# Patient Record
Sex: MALE | Race: WHITE | Employment: FULL TIME | URBAN - METROPOLITAN AREA
[De-identification: names, ages, dates, MRNs, and addresses within clinical notes are randomized per-mention and may not be internally consistent; named-entity substitution may affect disease eponyms.]

---

## 2017-08-30 ENCOUNTER — OFFICE VISIT (OUTPATIENT)
Dept: ORTHOPEDIC SURGERY | Age: 42
End: 2017-08-30

## 2017-08-30 VITALS
WEIGHT: 232 LBS | BODY MASS INDEX: 32.48 KG/M2 | SYSTOLIC BLOOD PRESSURE: 156 MMHG | HEIGHT: 71 IN | HEART RATE: 75 BPM | DIASTOLIC BLOOD PRESSURE: 80 MMHG

## 2017-08-30 DIAGNOSIS — M25.512 PAIN OF LEFT SHOULDER REGION: Primary | ICD-10-CM

## 2017-08-30 DIAGNOSIS — S46.212A BICEPS TENDON RUPTURE, PROXIMAL, LEFT, INITIAL ENCOUNTER: ICD-10-CM

## 2017-08-30 PROBLEM — S46.119A BICEPS TENDON RUPTURE, PROXIMAL: Status: ACTIVE | Noted: 2017-08-30

## 2017-08-30 PROCEDURE — 73030 X-RAY EXAM OF SHOULDER: CPT | Performed by: ORTHOPAEDIC SURGERY

## 2017-08-30 PROCEDURE — 99203 OFFICE O/P NEW LOW 30 MIN: CPT | Performed by: ORTHOPAEDIC SURGERY

## 2018-03-29 ENCOUNTER — OFFICE VISIT (OUTPATIENT)
Dept: ORTHOPEDIC SURGERY | Age: 43
End: 2018-03-29

## 2018-03-29 VITALS — HEIGHT: 71 IN | BODY MASS INDEX: 31.72 KG/M2 | WEIGHT: 226.6 LBS

## 2018-03-29 DIAGNOSIS — M25.512 LEFT SHOULDER PAIN, UNSPECIFIED CHRONICITY: Primary | ICD-10-CM

## 2018-03-29 DIAGNOSIS — S43.432A TEAR OF LEFT GLENOID LABRUM, INITIAL ENCOUNTER: ICD-10-CM

## 2018-03-29 PROCEDURE — 99213 OFFICE O/P EST LOW 20 MIN: CPT | Performed by: PHYSICIAN ASSISTANT

## 2018-03-29 NOTE — PROGRESS NOTES
· Skin:  There are no rashes, ulcerations or lesions. · Gait & station: Normal gait      · Additional Examinations:        Right Upper Extremity:  Examination of the right upper extremity does not show any tenderness, deformity or injury. Range of motion is unremarkable. There is no gross instability. There are no rashes, ulcerations or lesions. Strength and tone are normal.      Diagnostic Testing:      Views:  3   Location:  Left shoulder   Findings:  Minimally sloped acromion, I do not see any significant arthritis at the a.c. joint but there looks to be early degenerative changes of the glenohumeral joint on x-ray    Orders     Orders Placed This Encounter   Procedures    XR SHOULDER LEFT (MIN 2 VIEWS)     [id number]     Order Specific Question:   Reason for exam:     Answer:   Pain         Assessment / Treatment Plan:     1. LEFT shoulder rotator cuff strain with suspicion for rotator cuff and labral tear    The patient is very active and heavy weightlifting. His job duties are also very physical.  He has had a 3-1/2 month history of ongoing and worsening anterior shoulder pain with associated mechanical symptoms. This is made weight training and also his work duties very difficult for him. He has been performing home exercises for the last 3 months including rotator cuff strengthening. He is also been using ice and elevation as well as over-the-counter NSAIDs with ongoing pain.   I recommended MRI arthrogram and follow-up with the results

## 2018-03-30 ENCOUNTER — TELEPHONE (OUTPATIENT)
Dept: ORTHOPEDIC SURGERY | Age: 43
End: 2018-03-30

## 2018-04-03 DIAGNOSIS — S43.432A TEAR OF LEFT GLENOID LABRUM, INITIAL ENCOUNTER: Primary | ICD-10-CM

## 2018-04-04 ENCOUNTER — TELEPHONE (OUTPATIENT)
Dept: ORTHOPEDIC SURGERY | Age: 43
End: 2018-04-04

## 2018-04-04 ENCOUNTER — HOSPITAL ENCOUNTER (OUTPATIENT)
Dept: MRI IMAGING | Age: 43
Discharge: HOME | End: 2018-04-04
Attending: PHYSICIAN ASSISTANT | Admitting: PHYSICIAN ASSISTANT

## 2018-04-04 DIAGNOSIS — S43.432A SUPERIOR GLENOID LABRUM LESION OF LEFT SHOULDER: ICD-10-CM

## 2018-04-04 DIAGNOSIS — S43.432A TEAR OF LEFT GLENOID LABRUM, INITIAL ENCOUNTER: ICD-10-CM

## 2018-04-09 ENCOUNTER — OFFICE VISIT (OUTPATIENT)
Dept: ORTHOPEDIC SURGERY | Age: 43
End: 2018-04-09

## 2018-04-09 VITALS
DIASTOLIC BLOOD PRESSURE: 89 MMHG | WEIGHT: 226.63 LBS | HEART RATE: 71 BPM | HEIGHT: 71 IN | SYSTOLIC BLOOD PRESSURE: 127 MMHG | BODY MASS INDEX: 31.73 KG/M2

## 2018-04-09 DIAGNOSIS — S46.212A RUPTURE OF LEFT PROXIMAL BICEPS TENDON, INITIAL ENCOUNTER: Primary | ICD-10-CM

## 2018-04-09 DIAGNOSIS — M25.512 LEFT SHOULDER PAIN, UNSPECIFIED CHRONICITY: ICD-10-CM

## 2018-04-09 DIAGNOSIS — M75.112 INCOMPLETE TEAR OF LEFT ROTATOR CUFF: ICD-10-CM

## 2018-04-09 DIAGNOSIS — M77.11 LATERAL EPICONDYLITIS OF BOTH ELBOWS: ICD-10-CM

## 2018-04-09 DIAGNOSIS — S43.102A SEPARATION OF LEFT ACROMIOCLAVICULAR JOINT, INITIAL ENCOUNTER: ICD-10-CM

## 2018-04-09 DIAGNOSIS — M77.12 LATERAL EPICONDYLITIS OF BOTH ELBOWS: ICD-10-CM

## 2018-04-09 PROCEDURE — 99213 OFFICE O/P EST LOW 20 MIN: CPT | Performed by: ORTHOPAEDIC SURGERY

## 2018-04-09 RX ORDER — DICLOFENAC SODIUM 75 MG/1
75 TABLET, DELAYED RELEASE ORAL 2 TIMES DAILY
Qty: 60 TABLET | Refills: 1 | Status: SHIPPED | OUTPATIENT
Start: 2018-04-09 | End: 2018-06-05 | Stop reason: SDUPTHER

## 2018-06-05 DIAGNOSIS — M75.112 INCOMPLETE TEAR OF LEFT ROTATOR CUFF: ICD-10-CM

## 2018-06-06 RX ORDER — DICLOFENAC SODIUM 75 MG/1
TABLET, DELAYED RELEASE ORAL
Qty: 60 TABLET | Refills: 0 | Status: SHIPPED | OUTPATIENT
Start: 2018-06-06 | End: 2018-07-02 | Stop reason: SDUPTHER

## 2018-07-02 DIAGNOSIS — M75.112 INCOMPLETE TEAR OF LEFT ROTATOR CUFF: ICD-10-CM

## 2018-07-02 RX ORDER — DICLOFENAC SODIUM 75 MG/1
TABLET, DELAYED RELEASE ORAL
Qty: 60 TABLET | Refills: 0 | Status: SHIPPED | OUTPATIENT
Start: 2018-07-02 | End: 2018-08-06 | Stop reason: SDUPTHER

## 2018-08-06 DIAGNOSIS — M75.112 INCOMPLETE TEAR OF LEFT ROTATOR CUFF: ICD-10-CM

## 2018-08-06 RX ORDER — DICLOFENAC SODIUM 75 MG/1
TABLET, DELAYED RELEASE ORAL
Qty: 60 TABLET | Refills: 0 | Status: SHIPPED | OUTPATIENT
Start: 2018-08-06 | End: 2018-09-07 | Stop reason: SDUPTHER

## 2018-09-07 DIAGNOSIS — M75.112 INCOMPLETE TEAR OF LEFT ROTATOR CUFF: ICD-10-CM

## 2018-09-10 DIAGNOSIS — M75.112 INCOMPLETE TEAR OF LEFT ROTATOR CUFF: ICD-10-CM

## 2018-09-10 RX ORDER — DICLOFENAC SODIUM 75 MG/1
TABLET, DELAYED RELEASE ORAL
Qty: 60 TABLET | Refills: 0 | Status: SHIPPED | OUTPATIENT
Start: 2018-09-10 | End: 2018-09-10 | Stop reason: SDUPTHER

## 2018-09-10 RX ORDER — DICLOFENAC SODIUM 75 MG/1
75 TABLET, DELAYED RELEASE ORAL 2 TIMES DAILY
Qty: 60 TABLET | Refills: 2 | Status: SHIPPED | OUTPATIENT
Start: 2018-09-10 | End: 2018-11-05 | Stop reason: ALTCHOICE

## 2018-09-25 ENCOUNTER — HOSPITAL ENCOUNTER (EMERGENCY)
Age: 43
Discharge: HOME | End: 2018-09-26

## 2018-09-25 VITALS
RESPIRATION RATE: 18 BRPM | HEIGHT: 71 IN | DIASTOLIC BLOOD PRESSURE: 96 MMHG | SYSTOLIC BLOOD PRESSURE: 159 MMHG | WEIGHT: 225 LBS | TEMPERATURE: 98.2 F | HEART RATE: 77 BPM | BODY MASS INDEX: 31.5 KG/M2 | OXYGEN SATURATION: 97 %

## 2018-09-25 DIAGNOSIS — K04.7 DENTAL INFECTION: Primary | ICD-10-CM

## 2018-09-25 PROCEDURE — 99282 EMERGENCY DEPT VISIT SF MDM: CPT

## 2018-09-25 RX ORDER — PENICILLIN V POTASSIUM 250 MG/1
500 TABLET ORAL ONCE
Status: COMPLETED | OUTPATIENT
Start: 2018-09-26 | End: 2018-09-26

## 2018-09-25 RX ORDER — PENICILLIN V POTASSIUM 500 MG/1
500 TABLET ORAL 4 TIMES DAILY
Qty: 28 TABLET | Refills: 0 | Status: SHIPPED | OUTPATIENT
Start: 2018-09-25 | End: 2018-10-02

## 2018-09-25 ASSESSMENT — PAIN DESCRIPTION - FREQUENCY: FREQUENCY: CONTINUOUS

## 2018-09-25 ASSESSMENT — PAIN DESCRIPTION - PAIN TYPE: TYPE: ACUTE PAIN

## 2018-09-25 ASSESSMENT — PAIN DESCRIPTION - LOCATION: LOCATION: TEETH

## 2018-09-25 ASSESSMENT — PAIN DESCRIPTION - DESCRIPTORS: DESCRIPTORS: ACHING

## 2018-09-25 ASSESSMENT — PAIN SCALES - GENERAL: PAINLEVEL_OUTOF10: 7

## 2018-09-26 PROCEDURE — 6370000000 HC RX 637 (ALT 250 FOR IP): Performed by: NURSE PRACTITIONER

## 2018-09-26 RX ADMIN — PENICILLIN V POTASSIUM 500 MG: 250 TABLET ORAL at 00:02

## 2018-09-26 NOTE — ED NOTES
Chief Complaint   Patient presents with    Dental Pain     pt states that he has had a broken tooth x 2 years. pt states that he past couple days it has been hurting more. Pt placed in room 15. Nad. Bed in low position, call light in reach. Will continue to monitor.       Yakelin Rowley RN  09/25/18 1200

## 2018-09-26 NOTE — ED PROVIDER NOTES
[facility]  eMERGENCY dEPARTMENT eNCOUnter      Pt Name: Audrey Matthews  MRN: [id number]  [place] 1975  Date of evaluation: 9/25/2018  Provider: ELBA Mason CNP      Chief Complaint:    Chief Complaint   Patient presents with    Dental Pain     pt states that he has had a broken tooth x 2 years. pt states that he past couple days it has been hurting more. Nursing Notes, Past Medical Hx, Past Surgical Hx, Social Hx, Allergies, and Family Hx were all reviewed and agreed with or any disagreements were addressed in the HPI.    HPI:  (Location, Duration, Timing, Severity, Quality, Assoc Sx, Context, Modifying factors)  This is a  44 y.o. male who presents the emergency department complaints of right upper dental pain. Patient reports he has had a broken tooth for over 1 year and then yesterday he developed pain in this tooth. He denies any fevers or chills. He does report some drainage from the tooth. He has not seen a dentist in several years. Past Medical/Surgical History:      Diagnosis Date    Asthma     as child         Procedure Laterality Date    ANKLE SURGERY      HAND SURGERY         Medications:  Discharge Medication List as of 9/25/2018 11:59 PM      CONTINUE these medications which have NOT CHANGED    Details   diclofenac (VOLTAREN) 75 MG EC tablet Take 1 tablet by mouth 2 times daily, Disp-60 tablet, R-2Normal               Review of Systems:  Review of Systems   Constitutional: Negative for chills and fever. HENT: Positive for dental problem. All other systems reviewed and are negative. Positives and Pertinent negatives as per HPI. Except as noted above in the ROS, problem specific ROS was completed and is negative. Physical Exam:  Physical Exam   Constitutional: He appears well-developed and well-nourished. HENT:   Head: Normocephalic and atraumatic.    Right Ear: External ear normal.   Left Ear: External ear normal.   Nose: Nose normal.   Mouth/Throat: Uvula is midline and oropharynx is clear and moist. No trismus in the jaw. Dental caries present. No dental abscesses. Eyes: Conjunctivae are normal.   Neck: Normal range of motion. Cardiovascular: Normal rate. Pulmonary/Chest: Effort normal.   Abdominal: He exhibits no distension. Musculoskeletal: Normal range of motion. Neurological: He is alert. Skin: Skin is warm and dry. Psychiatric: He has a normal mood and affect. His behavior is normal.   Nursing note and vitals reviewed. MEDICAL DECISION MAKING    Vitals:    Vitals:    09/25/18 2334   BP: (!) 159/96   Pulse: 77   Resp: 18   Temp: 98.2 °F (36.8 °C)   TempSrc: Oral   SpO2: 97%   Weight: 225 lb (102.1 kg)   Height: 5' 11\" (1.803 m)       LABS: Labs Reviewed - No data to display     Remainder of labs reviewed and were negative at this time or not returned at the time of this note. RADIOLOGY:   Non-plain film images such as CT, Ultrasound and MRI are read by the radiologist. ELBA Silva Chi, CNP have directly visualized the radiologic plain film image(s) with the below findings:        Interpretation per the Radiologist below, if available at the time of this note:    No orders to display        No results found. MEDICAL DECISION MAKING / ED COURSE:      PROCEDURES:   Procedures    None    Patient was given:  Medications   penicillin v potassium (VEETID) tablet 500 mg (500 mg Oral Given 9/26/18 0002)       Patient presented to the emergency department complaints of dental pain. Physical exam revealed a broken tooth with purulent drainage. There is no dental abscess. He had no trismus. No fevers or chills. He was started on penicillin. He was instructed to continue taking his diclofenac for pain. I did provide him with dental referrals. He is to follow-up with a dentist as soon as possible.   He is to return to the emergency department with any worsening symptoms. I discussed treatment plan with patient, patient is agreeable and denies questions at this time. The patient tolerated their visit well. I evaluated the patient. The physician was available for consultation as needed. The patient and / or the family were informed of the results of any tests, a time was given to answer questions, a plan was proposed and they agreed with plan. CLINICAL IMPRESSION:  1.  Dental infection        DISPOSITION Decision To Discharge 09/25/2018 11:58:59 PM      PATIENT REFERRED TO:  A dentist    Schedule an appointment as soon as possible for a visit in 3 days  For follow up care    [facility] ED  [address] [facility]  Go to   As needed, If symptoms worsen      DISCHARGE MEDICATIONS:  Discharge Medication List as of 9/25/2018 11:59 PM      START taking these medications    Details   penicillin v potassium (VEETID) 500 MG tablet Take 1 tablet by mouth 4 times daily for 7 days, Disp-28 tablet, R-0Print             DISCONTINUED MEDICATIONS:  Discharge Medication List as of 9/25/2018 11:59 PM                 (Please note the MDM and HPI sections of this note were completed with a voice recognition program.  Efforts were made to edit the dictations but occasionally words are mis-transcribed.)    Electronically signed, ELBA Gonzales CNP,        ELBA Gonzales CNP  09/26/18 0015

## 2018-11-05 ENCOUNTER — HOSPITAL ENCOUNTER (EMERGENCY)
Age: 43
Discharge: HOME | End: 2018-11-06

## 2018-11-05 VITALS
TEMPERATURE: 97.6 F | OXYGEN SATURATION: 98 % | RESPIRATION RATE: 14 BRPM | HEIGHT: 71 IN | HEART RATE: 75 BPM | SYSTOLIC BLOOD PRESSURE: 142 MMHG | DIASTOLIC BLOOD PRESSURE: 92 MMHG | WEIGHT: 225 LBS | BODY MASS INDEX: 31.5 KG/M2

## 2018-11-05 DIAGNOSIS — K08.89 PAIN, DENTAL: ICD-10-CM

## 2018-11-05 DIAGNOSIS — R68.84 JAW PAIN: ICD-10-CM

## 2018-11-05 DIAGNOSIS — K05.00 ACUTE GINGIVITIS: Primary | ICD-10-CM

## 2018-11-05 PROCEDURE — 99282 EMERGENCY DEPT VISIT SF MDM: CPT

## 2018-11-05 RX ORDER — CLINDAMYCIN HYDROCHLORIDE 300 MG/1
300 CAPSULE ORAL 4 TIMES DAILY
Qty: 28 CAPSULE | Refills: 0 | Status: SHIPPED | OUTPATIENT
Start: 2018-11-05 | End: 2018-11-12

## 2018-11-05 RX ORDER — HYDROCODONE BITARTRATE AND ACETAMINOPHEN 5; 325 MG/1; MG/1
1 TABLET ORAL ONCE
Status: DISCONTINUED | OUTPATIENT
Start: 2018-11-06 | End: 2018-11-06 | Stop reason: HOSPADM

## 2018-11-05 RX ORDER — CLINDAMYCIN HYDROCHLORIDE 150 MG/1
300 CAPSULE ORAL ONCE
Status: COMPLETED | OUTPATIENT
Start: 2018-11-06 | End: 2018-11-06

## 2018-11-05 RX ORDER — HYDROCODONE BITARTRATE AND ACETAMINOPHEN 5; 325 MG/1; MG/1
1 TABLET ORAL EVERY 6 HOURS PRN
Qty: 10 TABLET | Refills: 0 | Status: SHIPPED | OUTPATIENT
Start: 2018-11-05 | End: 2018-11-12

## 2018-11-05 ASSESSMENT — PAIN DESCRIPTION - LOCATION: LOCATION: FACE;TEETH

## 2018-11-05 ASSESSMENT — PAIN SCALES - GENERAL: PAINLEVEL_OUTOF10: 8

## 2018-11-06 PROCEDURE — 6370000000 HC RX 637 (ALT 250 FOR IP): Performed by: PHYSICIAN ASSISTANT

## 2018-11-06 RX ADMIN — CLINDAMYCIN HYDROCHLORIDE 300 MG: 150 CAPSULE ORAL at 00:39

## 2018-11-08 ASSESSMENT — ENCOUNTER SYMPTOMS
EYE REDNESS: 0
NAUSEA: 0
RHINORRHEA: 0
COUGH: 0
BACK PAIN: 0
ABDOMINAL PAIN: 0
EYE PAIN: 0
FACIAL SWELLING: 0
SORE THROAT: 0
DIARRHEA: 0
CONSTIPATION: 0
CHEST TIGHTNESS: 0
SHORTNESS OF BREATH: 0

## 2018-11-08 NOTE — ED PROVIDER NOTES
film images such as CT, Ultrasound and MRI are read by the radiologist. Tremayne Torres PA-C have directly visualized the radiologic plain film image(s) with the below findings:        Interpretation per the Radiologist below, if available at the time of thisnote:    No orders to display        No results found. MEDICAL DECISION MAKING / ED COURSE:      PROCEDURES:   Procedures    None    Patient was given:  Medications   clindamycin (CLEOCIN) capsule 300 mg (300 mg Oral Given 11/6/18 0039)       This patient presented to the emergency department for evaluation of Dental pain. At presentation, vital signs were stable. The patient was in no acute distress. Physical Exam findings were as noted above. He does have a dental nerve exposed therefore I do believe that narcotic pain medicine patient can be warranted. However, the patient did not have a ride home so he was not administered any here today. Does appear to have an infected tooth which he has been told previous times to follow-up with a dentist.  He was provided with some dental clinics in the area. I discussed these results with the patient and he/she understood    I discussed the nature and purpose, risks and benefits, as well as, the alternatives of opiates for pain relief with Mukesh Greco. The risks discussed included but were not limited to overdose, addiction, dependence, and tolerance. Mukesh Greco was given the time and opportunity to ask questions and consider their options, and after the discussion, Mukesh Greco decided to verbally consent to opiates. Prior to consenting, I believe that Mukesh Greco has the capacity to make this medical decision. This patient will be discharged home with the medications listed below. I counseled on how to take these medicines and risks/benefits and AEs. The patient was agreeable to the prescriptions.  He/She will follow up with primary care provider or present back for worsening symptoms. I estimate there is LOW risk for a DEEP SPACE INFECTION (e.g., JUANS ANGINA OR RETROPHARYNGEAL ABSCESS), MENINGITIS, or AIRWAY COMPROMISE, thus I consider the discharge disposition reasonable. Also, there is no evidence or peritonitis, sepsis, or toxicity. The patient tolerated their visit well. I evaluated the patient. The physician was available for consultation as needed. The patient and / or the family were informed of the results of anytests, a time was given to answer questions, a plan was proposed and they agreed with plan. CLINICAL IMPRESSION:  1. Acute gingivitis    2. Jaw pain    3. Pain, dental        DISPOSITION Decision To Discharge 11/05/2018 11:54:46 PM      PATIENT REFERRED TO:  [address]  [address] [address]  [phone]  Go to   As needed, If symptoms worsen      DISCHARGE MEDICATIONS:  Discharge Medication List as of 11/5/2018 11:56 PM      START taking these medications    Details   clindamycin (CLEOCIN) 300 MG capsule Take 1 capsule by mouth 4 times daily for 7 days, Disp-28 capsule, R-0Print      HYDROcodone-acetaminophen (NORCO) 5-325 MG per tablet Take 1 tablet by mouth every 6 hours as needed for Pain for up to 7 days. ., Disp-10 tablet, R-0Print             DISCONTINUED MEDICATIONS:  Discharge Medication List as of 11/5/2018 11:56 PM                 (Please note the MDM and HPI sections of this note were completed with a voice recognition program.  Efforts weremade to edit the dictations but occasionally words are mis-transcribed.)    Electronically signed, Matt Crawford PA-C,           Silvestre Augustin PA-C  11/08/18 5939

## 2018-12-31 ENCOUNTER — HOSPITAL ENCOUNTER (EMERGENCY)
Age: 43
Discharge: HOME | End: 2018-12-31

## 2018-12-31 VITALS
SYSTOLIC BLOOD PRESSURE: 130 MMHG | OXYGEN SATURATION: 99 % | WEIGHT: 225 LBS | TEMPERATURE: 98.3 F | BODY MASS INDEX: 31.5 KG/M2 | HEART RATE: 85 BPM | HEIGHT: 71 IN | RESPIRATION RATE: 15 BRPM | DIASTOLIC BLOOD PRESSURE: 88 MMHG

## 2018-12-31 DIAGNOSIS — K02.9 DENTAL CARIES: ICD-10-CM

## 2018-12-31 DIAGNOSIS — K08.89 PAIN, DENTAL: Primary | ICD-10-CM

## 2018-12-31 PROCEDURE — 99282 EMERGENCY DEPT VISIT SF MDM: CPT

## 2018-12-31 PROCEDURE — 6370000000 HC RX 637 (ALT 250 FOR IP): Performed by: NURSE PRACTITIONER

## 2018-12-31 RX ORDER — CHLORHEXIDINE GLUCONATE 0.12 MG/ML
15 RINSE ORAL 2 TIMES DAILY
Qty: 420 ML | Refills: 0 | Status: SHIPPED | OUTPATIENT
Start: 2018-12-31 | End: 2019-01-14

## 2018-12-31 RX ORDER — PENICILLIN V POTASSIUM 500 MG/1
500 TABLET ORAL 4 TIMES DAILY
Qty: 40 TABLET | Refills: 0 | Status: SHIPPED | OUTPATIENT
Start: 2018-12-31 | End: 2019-01-10

## 2018-12-31 RX ORDER — NAPROXEN 375 MG/1
375 TABLET ORAL 2 TIMES DAILY
Qty: 20 TABLET | Refills: 0 | Status: SHIPPED | OUTPATIENT
Start: 2018-12-31 | End: 2019-12-07

## 2018-12-31 RX ORDER — NAPROXEN 250 MG/1
250 TABLET ORAL ONCE
Status: COMPLETED | OUTPATIENT
Start: 2018-12-31 | End: 2018-12-31

## 2018-12-31 RX ORDER — PENICILLIN V POTASSIUM 250 MG/1
500 TABLET ORAL ONCE
Status: COMPLETED | OUTPATIENT
Start: 2018-12-31 | End: 2018-12-31

## 2018-12-31 RX ADMIN — NAPROXEN 250 MG: 250 TABLET ORAL at 20:19

## 2018-12-31 RX ADMIN — PENICILLIN V POTASSIUM 500 MG: 250 TABLET ORAL at 20:19

## 2018-12-31 RX ADMIN — LIDOCAINE HYDROCHLORIDE 5 ML: 20 SOLUTION ORAL; TOPICAL at 20:19

## 2018-12-31 ASSESSMENT — PAIN SCALES - GENERAL: PAINLEVEL_OUTOF10: 7

## 2018-12-31 ASSESSMENT — PAIN DESCRIPTION - LOCATION: LOCATION: TEETH

## 2018-12-31 ASSESSMENT — PAIN DESCRIPTION - PAIN TYPE: TYPE: ACUTE PAIN

## 2018-12-31 ASSESSMENT — PAIN DESCRIPTION - ORIENTATION: ORIENTATION: RIGHT;UPPER

## 2019-12-07 ENCOUNTER — HOSPITAL ENCOUNTER (EMERGENCY)
Age: 44
Discharge: HOME | End: 2019-12-07

## 2019-12-07 VITALS
RESPIRATION RATE: 18 BRPM | HEART RATE: 86 BPM | TEMPERATURE: 97.1 F | DIASTOLIC BLOOD PRESSURE: 78 MMHG | SYSTOLIC BLOOD PRESSURE: 136 MMHG | OXYGEN SATURATION: 100 %

## 2019-12-07 DIAGNOSIS — R03.0 ELEVATED BLOOD PRESSURE READING: ICD-10-CM

## 2019-12-07 DIAGNOSIS — M54.2 NECK PAIN: Primary | ICD-10-CM

## 2019-12-07 PROCEDURE — 6370000000 HC RX 637 (ALT 250 FOR IP): Performed by: EMERGENCY MEDICINE

## 2019-12-07 PROCEDURE — 99283 EMERGENCY DEPT VISIT LOW MDM: CPT

## 2019-12-07 RX ORDER — NAPROXEN 500 MG/1
500 TABLET ORAL 2 TIMES DAILY
Qty: 20 TABLET | Refills: 0 | Status: SHIPPED | OUTPATIENT
Start: 2019-12-07 | End: 2020-05-27

## 2019-12-07 RX ORDER — LIDOCAINE 50 MG/G
1 PATCH TOPICAL DAILY
Qty: 30 PATCH | Refills: 0 | Status: SHIPPED | OUTPATIENT
Start: 2019-12-07 | End: 2019-12-12

## 2019-12-07 RX ORDER — PREDNISONE 20 MG/1
40 TABLET ORAL DAILY
Qty: 10 TABLET | Refills: 0 | Status: SHIPPED | OUTPATIENT
Start: 2019-12-07 | End: 2019-12-12

## 2019-12-07 RX ORDER — METHOCARBAMOL 500 MG/1
500 TABLET, FILM COATED ORAL ONCE
Status: COMPLETED | OUTPATIENT
Start: 2019-12-07 | End: 2019-12-07

## 2019-12-07 RX ORDER — NAPROXEN 500 MG/1
500 TABLET ORAL ONCE
Status: COMPLETED | OUTPATIENT
Start: 2019-12-07 | End: 2019-12-07

## 2019-12-07 RX ORDER — LIDOCAINE 4 G/G
1 PATCH TOPICAL ONCE
Status: DISCONTINUED | OUTPATIENT
Start: 2019-12-07 | End: 2019-12-07 | Stop reason: HOSPADM

## 2019-12-07 RX ORDER — PREDNISONE 20 MG/1
40 TABLET ORAL ONCE
Status: COMPLETED | OUTPATIENT
Start: 2019-12-07 | End: 2019-12-07

## 2019-12-07 RX ORDER — METHOCARBAMOL 750 MG/1
750 TABLET, FILM COATED ORAL 4 TIMES DAILY
Qty: 40 TABLET | Refills: 0 | Status: SHIPPED | OUTPATIENT
Start: 2019-12-07 | End: 2019-12-17

## 2019-12-07 RX ADMIN — NAPROXEN 500 MG: 500 TABLET ORAL at 14:27

## 2019-12-07 RX ADMIN — METHOCARBAMOL TABLETS 500 MG: 500 TABLET, COATED ORAL at 14:27

## 2019-12-07 RX ADMIN — PREDNISONE 40 MG: 20 TABLET ORAL at 14:27

## 2019-12-07 ASSESSMENT — PAIN SCALES - GENERAL
PAINLEVEL_OUTOF10: 6
PAINLEVEL_OUTOF10: 6

## 2019-12-07 ASSESSMENT — PAIN DESCRIPTION - ORIENTATION: ORIENTATION: RIGHT

## 2019-12-07 ASSESSMENT — PAIN DESCRIPTION - LOCATION: LOCATION: SHOULDER

## 2019-12-07 ASSESSMENT — PAIN DESCRIPTION - PAIN TYPE: TYPE: ACUTE PAIN

## 2019-12-07 ASSESSMENT — PAIN DESCRIPTION - DESCRIPTORS: DESCRIPTORS: ACHING

## 2019-12-12 ENCOUNTER — OFFICE VISIT (OUTPATIENT)
Dept: ORTHOPEDIC SURGERY | Age: 44
End: 2019-12-12

## 2019-12-12 VITALS
HEIGHT: 71 IN | SYSTOLIC BLOOD PRESSURE: 130 MMHG | HEART RATE: 76 BPM | BODY MASS INDEX: 31.5 KG/M2 | WEIGHT: 225 LBS | DIASTOLIC BLOOD PRESSURE: 80 MMHG

## 2019-12-12 DIAGNOSIS — M54.12 CERVICAL RADICULOPATHY: ICD-10-CM

## 2019-12-12 DIAGNOSIS — M54.2 NECK PAIN: Primary | ICD-10-CM

## 2019-12-12 PROCEDURE — 99203 OFFICE O/P NEW LOW 30 MIN: CPT | Performed by: PHYSICAL MEDICINE & REHABILITATION

## 2020-05-12 ENCOUNTER — HOSPITAL ENCOUNTER (EMERGENCY)
Age: 45
Discharge: HOME | End: 2020-05-12

## 2020-05-12 VITALS
RESPIRATION RATE: 18 BRPM | TEMPERATURE: 98.1 F | HEIGHT: 70 IN | HEART RATE: 78 BPM | WEIGHT: 220 LBS | OXYGEN SATURATION: 97 % | BODY MASS INDEX: 31.5 KG/M2 | DIASTOLIC BLOOD PRESSURE: 92 MMHG | SYSTOLIC BLOOD PRESSURE: 148 MMHG

## 2020-05-12 PROCEDURE — 99282 EMERGENCY DEPT VISIT SF MDM: CPT

## 2020-05-12 PROCEDURE — 6370000000 HC RX 637 (ALT 250 FOR IP): Performed by: PHYSICIAN ASSISTANT

## 2020-05-12 RX ORDER — NAPROXEN 500 MG/1
500 TABLET ORAL ONCE
Status: COMPLETED | OUTPATIENT
Start: 2020-05-12 | End: 2020-05-12

## 2020-05-12 RX ORDER — NAPROXEN 500 MG/1
500 TABLET ORAL 2 TIMES DAILY
Qty: 20 TABLET | Refills: 0 | Status: SHIPPED | OUTPATIENT
Start: 2020-05-12 | End: 2020-05-27

## 2020-05-12 RX ORDER — LIDOCAINE HYDROCHLORIDE 20 MG/ML
15 SOLUTION OROPHARYNGEAL PRN
Qty: 1 BOTTLE | Refills: 0 | Status: SHIPPED | OUTPATIENT
Start: 2020-05-12 | End: 2020-05-27

## 2020-05-12 RX ORDER — PENICILLIN V POTASSIUM 500 MG/1
500 TABLET ORAL 4 TIMES DAILY
Qty: 28 TABLET | Refills: 0 | Status: SHIPPED | OUTPATIENT
Start: 2020-05-12 | End: 2020-05-19

## 2020-05-12 RX ORDER — PENICILLIN V POTASSIUM 250 MG/1
500 TABLET ORAL ONCE
Status: COMPLETED | OUTPATIENT
Start: 2020-05-12 | End: 2020-05-12

## 2020-05-12 RX ORDER — LIDOCAINE HYDROCHLORIDE 20 MG/ML
15 SOLUTION OROPHARYNGEAL ONCE
Status: COMPLETED | OUTPATIENT
Start: 2020-05-12 | End: 2020-05-12

## 2020-05-12 RX ADMIN — PENICILLIN V POTASIUM 500 MG: 250 TABLET ORAL at 18:27

## 2020-05-12 RX ADMIN — NAPROXEN 500 MG: 500 TABLET ORAL at 18:27

## 2020-05-12 RX ADMIN — LIDOCAINE HYDROCHLORIDE 15 ML: 20 SOLUTION ORAL; TOPICAL at 18:27

## 2020-05-12 ASSESSMENT — ENCOUNTER SYMPTOMS
GASTROINTESTINAL NEGATIVE: 1
RESPIRATORY NEGATIVE: 1

## 2020-05-12 ASSESSMENT — PAIN DESCRIPTION - DESCRIPTORS: DESCRIPTORS: THROBBING;SHOOTING

## 2020-05-12 ASSESSMENT — PAIN DESCRIPTION - PAIN TYPE: TYPE: ACUTE PAIN

## 2020-05-12 ASSESSMENT — PAIN DESCRIPTION - LOCATION: LOCATION: TEETH;MOUTH

## 2020-05-12 ASSESSMENT — PAIN SCALES - GENERAL
PAINLEVEL_OUTOF10: 10
PAINLEVEL_OUTOF10: 10

## 2020-05-12 ASSESSMENT — PAIN DESCRIPTION - FREQUENCY: FREQUENCY: CONTINUOUS

## 2020-05-12 NOTE — ED PROVIDER NOTES
viscous hcl (XYLOCAINE) 2 % SOLN solution Take 15 mLs by mouth as needed for Irritation or Dental Pain, Disp-1 Bottle, R-0Print      penicillin v potassium (VEETID) 500 MG tablet Take 1 tablet by mouth 4 times daily for 7 days, Disp-28 tablet, R-0Print             DISCONTINUED MEDICATIONS:  Discharge Medication List as of 5/12/2020  6:20 PM                 (Please note that portions of this note were completed with a voice recognition program.  Efforts were made to edit the dictations but occasionally words are mis-transcribed.)    Crispin Dyer PA-C (electronically signed)            Crispin Dyer PA-C  05/12/20 2727

## 2020-05-27 ENCOUNTER — HOSPITAL ENCOUNTER (EMERGENCY)
Age: 45
Discharge: HOME | End: 2020-05-27

## 2020-05-27 VITALS
DIASTOLIC BLOOD PRESSURE: 77 MMHG | HEIGHT: 70 IN | BODY MASS INDEX: 32.21 KG/M2 | RESPIRATION RATE: 17 BRPM | WEIGHT: 225 LBS | SYSTOLIC BLOOD PRESSURE: 139 MMHG | OXYGEN SATURATION: 99 % | TEMPERATURE: 97.9 F | HEART RATE: 62 BPM

## 2020-05-27 PROCEDURE — 99282 EMERGENCY DEPT VISIT SF MDM: CPT

## 2020-05-27 PROCEDURE — 6370000000 HC RX 637 (ALT 250 FOR IP): Performed by: PHYSICIAN ASSISTANT

## 2020-05-27 RX ORDER — CHLORHEXIDINE GLUCONATE 0.12 MG/ML
15 RINSE ORAL 2 TIMES DAILY
Qty: 420 ML | Refills: 0 | Status: SHIPPED | OUTPATIENT
Start: 2020-05-27 | End: 2020-06-10

## 2020-05-27 RX ORDER — NAPROXEN 500 MG/1
500 TABLET ORAL 2 TIMES DAILY
Qty: 20 TABLET | Refills: 0 | Status: SHIPPED | OUTPATIENT
Start: 2020-05-27 | End: 2021-03-06

## 2020-05-27 RX ORDER — CLINDAMYCIN HYDROCHLORIDE 150 MG/1
450 CAPSULE ORAL ONCE
Status: COMPLETED | OUTPATIENT
Start: 2020-05-27 | End: 2020-05-27

## 2020-05-27 RX ORDER — CLINDAMYCIN HYDROCHLORIDE 300 MG/1
300 CAPSULE ORAL 4 TIMES DAILY
Qty: 40 CAPSULE | Refills: 0 | Status: SHIPPED | OUTPATIENT
Start: 2020-05-27 | End: 2020-06-06

## 2020-05-27 RX ADMIN — CLINDAMYCIN HYDROCHLORIDE 450 MG: 150 CAPSULE ORAL at 11:26

## 2020-05-27 ASSESSMENT — PAIN DESCRIPTION - PAIN TYPE: TYPE: ACUTE PAIN

## 2020-05-27 ASSESSMENT — PAIN SCALES - GENERAL: PAINLEVEL_OUTOF10: 3

## 2020-05-27 ASSESSMENT — PAIN DESCRIPTION - LOCATION: LOCATION: TEETH

## 2020-05-27 NOTE — ED PROVIDER NOTES
JUANS ANGINA OR RETROPHARYNGEAL ABSCESS), EPIGLOTTITIS, MENINGITIS, or AIRWAY COMPROMISE, thus I consider the discharge disposition reasonable. Also, there is no evidence or peritonitis, sepsis, or toxicity. López Lewis and I have discussed the diagnosis and risks, and we agree with discharging home to follow-up with their primary doctor. We also discussed returning to the Emergency Department immediately if new or worsening symptoms occur. We have discussed the symptoms which are most concerning (e.g., changing or worsening pain, trouble swallowing or breathing, neck stiffness or fever) that necessitate immediate return. Final Impression    1. Pain due to dental caries    2. Dental abscess    3. Dental decay        Discharge Vital Signs:  Blood pressure 139/77, pulse 62, temperature 97.9 °F (36.6 °C), temperature source Oral, resp. rate 17, height 5' 10\" (1.778 m), weight 225 lb (102.1 kg), SpO2 99 %. New Medications     Discharge Medication List as of 5/27/2020 11:22 AM      START taking these medications    Details   clindamycin (CLEOCIN) 300 MG capsule Take 1 capsule by mouth 4 times daily for 10 days, Disp-40 capsule, R-0Print      chlorhexidine (PERIDEX) 0.12 % solution Take 15 mLs by mouth 2 times daily for 14 days, Disp-420 mL, R-0Print           Electronically signed by Addie Govea PA-C   DD: 5/27/20  **This report was transcribed using voice recognition software. Every effort was made to ensure accuracy; however, inadvertent computerized transcription errors may be present.   END OF ED PROVIDER NOTE        Addie Govea PA-C  05/27/20 1350

## 2021-03-06 ENCOUNTER — APPOINTMENT (OUTPATIENT)
Dept: GENERAL RADIOLOGY | Age: 46
End: 2021-03-06

## 2021-03-06 ENCOUNTER — HOSPITAL ENCOUNTER (EMERGENCY)
Age: 46
Discharge: HOME | End: 2021-03-06
Attending: STUDENT IN AN ORGANIZED HEALTH CARE EDUCATION/TRAINING PROGRAM

## 2021-03-06 VITALS
HEART RATE: 72 BPM | WEIGHT: 220 LBS | HEIGHT: 71 IN | RESPIRATION RATE: 13 BRPM | OXYGEN SATURATION: 95 % | TEMPERATURE: 97.4 F | SYSTOLIC BLOOD PRESSURE: 142 MMHG | DIASTOLIC BLOOD PRESSURE: 91 MMHG | BODY MASS INDEX: 30.8 KG/M2

## 2021-03-06 DIAGNOSIS — R07.9 CHEST PAIN, UNSPECIFIED TYPE: Primary | ICD-10-CM

## 2021-03-06 LAB
A/G RATIO: 1.4 (ref 1.1–2.2)
ALBUMIN SERPL-MCNC: 4.6 G/DL (ref 3.4–5)
ALP BLD-CCNC: 49 U/L (ref 40–129)
ALT SERPL-CCNC: 106 U/L (ref 10–40)
ANION GAP SERPL CALCULATED.3IONS-SCNC: 9 MMOL/L (ref 3–16)
AST SERPL-CCNC: 89 U/L (ref 15–37)
BASOPHILS ABSOLUTE: 0.1 K/UL (ref 0–0.2)
BASOPHILS RELATIVE PERCENT: 1.3 %
BILIRUB SERPL-MCNC: 0.8 MG/DL (ref 0–1)
BUN BLDV-MCNC: 14 MG/DL (ref 7–20)
CALCIUM SERPL-MCNC: 9.8 MG/DL (ref 8.3–10.6)
CHLORIDE BLD-SCNC: 101 MMOL/L (ref 99–110)
CO2: 27 MMOL/L (ref 21–32)
CREAT SERPL-MCNC: 0.7 MG/DL (ref 0.9–1.3)
D DIMER: <200 NG/ML DDU (ref 0–229)
EOSINOPHILS ABSOLUTE: 0.3 K/UL (ref 0–0.6)
EOSINOPHILS RELATIVE PERCENT: 3.9 %
GFR AFRICAN AMERICAN: >60
GFR NON-AFRICAN AMERICAN: >60
GLOBULIN: 3.2 G/DL
GLUCOSE BLD-MCNC: 124 MG/DL (ref 70–99)
HCT VFR BLD CALC: 43.7 % (ref 40.5–52.5)
HEMOGLOBIN: 15.7 G/DL (ref 13.5–17.5)
LYMPHOCYTES ABSOLUTE: 3.8 K/UL (ref 1–5.1)
LYMPHOCYTES RELATIVE PERCENT: 42.7 %
MCH RBC QN AUTO: 32.3 PG (ref 26–34)
MCHC RBC AUTO-ENTMCNC: 35.9 G/DL (ref 31–36)
MCV RBC AUTO: 90.2 FL (ref 80–100)
MONOCYTES ABSOLUTE: 1 K/UL (ref 0–1.3)
MONOCYTES RELATIVE PERCENT: 10.7 %
NEUTROPHILS ABSOLUTE: 3.7 K/UL (ref 1.7–7.7)
NEUTROPHILS RELATIVE PERCENT: 41.4 %
PDW BLD-RTO: 13.6 % (ref 12.4–15.4)
PLATELET # BLD: 186 K/UL (ref 135–450)
PMV BLD AUTO: 8.5 FL (ref 5–10.5)
POTASSIUM REFLEX MAGNESIUM: 3.8 MMOL/L (ref 3.5–5.1)
PROCALCITONIN: 0.07 NG/ML (ref 0–0.15)
RBC # BLD: 4.84 M/UL (ref 4.2–5.9)
SODIUM BLD-SCNC: 137 MMOL/L (ref 136–145)
TOTAL PROTEIN: 7.8 G/DL (ref 6.4–8.2)
TROPONIN: <0.01 NG/ML
WBC # BLD: 9 K/UL (ref 4–11)

## 2021-03-06 PROCEDURE — 6370000000 HC RX 637 (ALT 250 FOR IP): Performed by: STUDENT IN AN ORGANIZED HEALTH CARE EDUCATION/TRAINING PROGRAM

## 2021-03-06 PROCEDURE — 6360000002 HC RX W HCPCS: Performed by: STUDENT IN AN ORGANIZED HEALTH CARE EDUCATION/TRAINING PROGRAM

## 2021-03-06 PROCEDURE — 85379 FIBRIN DEGRADATION QUANT: CPT

## 2021-03-06 PROCEDURE — 93005 ELECTROCARDIOGRAM TRACING: CPT | Performed by: STUDENT IN AN ORGANIZED HEALTH CARE EDUCATION/TRAINING PROGRAM

## 2021-03-06 PROCEDURE — 99284 EMERGENCY DEPT VISIT MOD MDM: CPT

## 2021-03-06 PROCEDURE — 71045 X-RAY EXAM CHEST 1 VIEW: CPT

## 2021-03-06 PROCEDURE — 84484 ASSAY OF TROPONIN QUANT: CPT

## 2021-03-06 PROCEDURE — 96374 THER/PROPH/DIAG INJ IV PUSH: CPT

## 2021-03-06 PROCEDURE — 80053 COMPREHEN METABOLIC PANEL: CPT

## 2021-03-06 PROCEDURE — 85025 COMPLETE CBC W/AUTO DIFF WBC: CPT

## 2021-03-06 PROCEDURE — 96375 TX/PRO/DX INJ NEW DRUG ADDON: CPT

## 2021-03-06 PROCEDURE — 84145 PROCALCITONIN (PCT): CPT

## 2021-03-06 PROCEDURE — 2500000003 HC RX 250 WO HCPCS: Performed by: STUDENT IN AN ORGANIZED HEALTH CARE EDUCATION/TRAINING PROGRAM

## 2021-03-06 PROCEDURE — C9113 INJ PANTOPRAZOLE SODIUM, VIA: HCPCS | Performed by: STUDENT IN AN ORGANIZED HEALTH CARE EDUCATION/TRAINING PROGRAM

## 2021-03-06 RX ORDER — OMEPRAZOLE 20 MG/1
20 CAPSULE, DELAYED RELEASE ORAL DAILY
Qty: 60 CAPSULE | Refills: 1 | Status: SHIPPED | OUTPATIENT
Start: 2021-03-06 | End: 2021-08-06

## 2021-03-06 RX ORDER — PANTOPRAZOLE SODIUM 40 MG/10ML
40 INJECTION, POWDER, LYOPHILIZED, FOR SOLUTION INTRAVENOUS ONCE
Status: COMPLETED | OUTPATIENT
Start: 2021-03-06 | End: 2021-03-06

## 2021-03-06 RX ADMIN — LIDOCAINE HYDROCHLORIDE: 20 SOLUTION ORAL; TOPICAL at 23:03

## 2021-03-06 RX ADMIN — FAMOTIDINE 20 MG: 10 INJECTION, SOLUTION INTRAVENOUS at 23:03

## 2021-03-06 RX ADMIN — PANTOPRAZOLE SODIUM 40 MG: 40 INJECTION, POWDER, FOR SOLUTION INTRAVENOUS at 23:03

## 2021-03-06 ASSESSMENT — PAIN DESCRIPTION - PAIN TYPE: TYPE: ACUTE PAIN

## 2021-03-06 ASSESSMENT — PAIN DESCRIPTION - ORIENTATION
ORIENTATION: LEFT
ORIENTATION: MID

## 2021-03-06 ASSESSMENT — PAIN DESCRIPTION - ONSET: ONSET: ON-GOING

## 2021-03-06 ASSESSMENT — PAIN DESCRIPTION - PROGRESSION: CLINICAL_PROGRESSION: RAPIDLY IMPROVING

## 2021-03-06 ASSESSMENT — PAIN DESCRIPTION - LOCATION: LOCATION: CHEST

## 2021-03-06 ASSESSMENT — PAIN - FUNCTIONAL ASSESSMENT: PAIN_FUNCTIONAL_ASSESSMENT: ACTIVITIES ARE NOT PREVENTED

## 2021-03-07 NOTE — ED NOTES
Patient reports that pain is subsiding and pain to back is getting better after drinking the GI cocktail. MD Anand at bedside discussing test results and medication administration for discharge. Patient is alert and oriented and verbalized understanding. DSD applied to IV site. --Patient provided with discharge instructions. --Instructions, and follow-up appointments reviewed with patient/family. No further questions or needs at this time. --Vital signs and patient stable upon discharge. --Patient [facility] to [facility].          [facility]  03/06/21 9848

## 2021-03-07 NOTE — ED NOTES
Patient repots that he ate eggs, gaffney and sausage this morning. Reports that it has been bothering him for months and feels like a gas pocket. Significant other reports that has been going on for a long time and his diet is really bad. Updated on plan of care at this time.       Mennie Plants, [place]  03/06/21 1339

## 2021-03-07 NOTE — ED PROVIDER NOTES
Primary Care Physician: No primary care provider on file. Attending Physician: No att. providers found     History   Chief Complaint   Patient presents with    Chest Pain     left-sided chest pain, patient states he has had mild pain in this area for months but today the pain became worse; denies any N/V or SOB         HPI   Rao Ceron is a 41 y.o. male with no significant medical history presenting this evening accompanied by significant other complaining of left-sided chest pain which he stated has been going on for a month but this evening persisted forcing him to seek care. Stated his pain is mild but denies nausea, vomiting, shortness of breath. He denies any URI symptoms, Kovic symptoms or exposures to persons infected coronavirus. No history of coronary artery or sudden death in the family. No history of DVTs. Past Medical History:   Diagnosis Date    Asthma     as child        Past Surgical History:   Procedure Laterality Date    ANKLE SURGERY      HAND SURGERY          History reviewed. No pertinent family history.      Social History     Socioeconomic History    Marital status:      Spouse name: None    Number of children: None    Years of education: None    Highest education level: None   Occupational History    None   Social Needs    Financial resource strain: None    Food insecurity     Worry: None     Inability: None    Transportation needs     Medical: None     Non-medical: None   Tobacco Use    Smoking status: Former Smoker    Smokeless tobacco: Former User     Types: Chew     Quit date: 9/6/2020   Substance and Sexual Activity    Alcohol use: Not Currently     Comment: 2-3 times a year    Drug use: Yes     Types: Marijuana     Comment: \"smoke a joint every once in awhile\"     Sexual activity: Yes     Partners: Female   Lifestyle    Physical activity     Days per week: None     Minutes per session: None    Stress: None   Relationships    Social connections Talks on phone: None     Gets together: None     Attends Muslim service: None     Active member of club or organization: None     Attends meetings of clubs or organizations: None     Relationship status: None    Intimate partner violence     Fear of current or ex partner: None     Emotionally abused: None     Physically abused: None     Forced sexual activity: None   Other Topics Concern    None   Social History Narrative    None        Review of Systems   10 total systems reviewed and found to be negative unless otherwise noted in HPI     Physical Exam   BP (!) 142/91   Pulse 72   Temp 97.4 °F (36.3 °C) (Oral)   Resp 13   Ht 5' 11\" (1.803 m)   Wt 220 lb (99.8 kg)   SpO2 95%   BMI 30.68 kg/m²      CONSTITUTIONAL: Well appearing, in no acute distress   HEAD: atraumatic, normocephalic   EYES: PERRL, No injection, discharge or scleral icterus. ENT: Moist mucous membranes. NECK: Normal ROM, NO LAD   CARDIOVASCULAR: Regular rate and rhythm. No murmurs or gallop. PULMONARY/CHEST: Airway patent. No retractions. Breath sounds clear with good air entry bilaterally. ABDOMEN: Soft, Non-distended and non-tender, without guarding or rebound. SKIN: Acyanotic, warm, dry   MUSCULOSKELETAL: No swelling, tenderness or deformity   NEUROLOGICAL: Awake and oriented x 3. Pulses intact. Grossly nonfocal   Nursing note and vitals reviewed.      ED Course & Medical Decision Making   Medications   famotidine (PEPCID) injection 20 mg (20 mg Intravenous Given 3/6/21 2303)   pantoprazole (PROTONIX) injection 40 mg (40 mg Intravenous Given 3/6/21 2303)   aluminum & magnesium hydroxide-simethicone (MAALOX) 30 mL, lidocaine viscous hcl (XYLOCAINE) 5 mL (GI COCKTAIL) ( Oral Given 3/6/21 2303)      Labs Reviewed   COMPREHENSIVE METABOLIC PANEL W/ REFLEX TO MG FOR LOW K - Abnormal; Notable for the following components:       Result Value    Glucose 124 (*)     CREATININE 0.7 (*)      (*)     AST 89 (*)     All other components within normal limits    Narrative:     Performed at:  [address], [place] [employer]   Phone [phone]   CBC WITH AUTO DIFFERENTIAL    Narrative:     Performed at:  [facility]) [address], [place] [employer]   Phone [phone]   TROPONIN    Narrative:     Performed at:  [facility]) - [address], [place] [employer]   Phone [phone]   PROCALCITONIN    Narrative:     Performed at:  [address], [place] [employer]   Phone [phone]   D-DIMER, QUANTITATIVE    Narrative:     Performed at:  [address], [place] [employer]   Phone [phone]      XR CHEST PORTABLE   Final Result   No radiographic evidence of acute pulmonary abnormality seen. Xr Chest Portable    Result Date: 3/6/2021  EXAMINATION: ONE XRAY VIEW OF THE CHEST 3/6/2021 9:58 pm COMPARISON: None. HISTORY: ORDERING SYSTEM PROVIDED HISTORY: chest pain TECHNOLOGIST PROVIDED HISTORY: Reason for exam:->chest pain Reason for Exam: Chest Pain (left-sided chest pain, patient states he has had mild pain in this area for months but today the pain became worse; denies any N/V or SOB ) FINDINGS: The cardiac silhouette appears within normal limits. No confluent airspace opacity, pleural effusion or pneumothorax is seen. There is a redemonstration of 5 mm nodularity seen overlying the lateral aspect of the left mid lung field appearing grossly similar to the prior study, favored to reflect a calcified granuloma. No radiographic evidence of acute pulmonary abnormality seen. EKG INTERPRETATION:  EKG by my preliminary interpretation shows sinus rhythm with rate of 66, normal axis, normal intervals, with no ST changes indicative of ischemia at this time.     PROCEDURES: Procedures    ASSESSMENT AND PLAN:  Qi Vasquez is a 41 y.o. male presenting with chest pain that has been going on for a month. His exam was unremarkable with normal vitals, afebrile. Labs obtained including troponin and D-dimer unremarkable. Please rule out ACS versus pulmonary embolism. Labs also showed normal white count ruling out pneumonia. EKG with no ischemic changes and chest x-ray with no acute lung disease. Patient was given GI cocktail with significant improvement. At this time we believe that his pain was probably secondary to acid reflux. Patient was stable discharged home with omeprazole and recommended following up with GI if his pain persisted. ClINICAL IMPRESSION:  1. Chest pain, unspecified type          PATIENT REFERRED TO:  [facility]) Pre-Services  [phone]          DISCHARGE MEDICATIONS:  Discharge Medication List as of 3/6/2021 11:06 PM      START taking these medications    Details   omeprazole (PRILOSEC) 20 MG delayed release capsule Take 1 capsule by mouth Daily, Disp-60 capsule, R-1Print           DISCONTINUED MEDICATIONS:  Discharge Medication List as of 3/6/2021 11:06 PM      STOP taking these medications       naproxen (NAPROSYN) 500 MG tablet Comments:   Reason for Stopping:             DISPOSITION Decision To Discharge 03/06/2021 11:05:08 PM  -We have instructed the patient, Qi Vasquez) to return to the ED or call him PCP if his pain/symptoms worsen. -Findings and recommendations explained to patient. He expressed understanding and agreed with the plan. Denver Bolls, MD (electronically signed)  3/7/2021  _________________________________________________________________________________________  _________________________________________________________________________________________  This record is transcribed utilizing voice recognition technology. There are inherent limitations in this technology.  In addition, there may be limitations in editing of this report. If there are any discrepancies, please contact me directly.         Shannan Mendosa MD  03/07/21 6806

## 2021-03-08 LAB
EKG ATRIAL RATE: 66 BPM
EKG DIAGNOSIS: NORMAL
EKG P AXIS: 34 DEGREES
EKG P-R INTERVAL: 150 MS
EKG Q-T INTERVAL: 372 MS
EKG QRS DURATION: 92 MS
EKG QTC CALCULATION (BAZETT): 389 MS
EKG R AXIS: 18 DEGREES
EKG T AXIS: 26 DEGREES
EKG VENTRICULAR RATE: 66 BPM

## 2021-03-08 PROCEDURE — 93010 ELECTROCARDIOGRAM REPORT: CPT | Performed by: INTERNAL MEDICINE

## 2021-08-06 ENCOUNTER — OFFICE VISIT (OUTPATIENT)
Dept: INTERNAL MEDICINE CLINIC | Age: 46
End: 2021-08-06

## 2021-08-06 VITALS
OXYGEN SATURATION: 96 % | DIASTOLIC BLOOD PRESSURE: 87 MMHG | BODY MASS INDEX: 28 KG/M2 | HEART RATE: 71 BPM | TEMPERATURE: 98.1 F | WEIGHT: 200 LBS | SYSTOLIC BLOOD PRESSURE: 122 MMHG | HEIGHT: 71 IN

## 2021-08-06 DIAGNOSIS — S16.1XXA STRAIN OF NECK MUSCLE, INITIAL ENCOUNTER: ICD-10-CM

## 2021-08-06 DIAGNOSIS — M75.01 ADHESIVE CAPSULITIS OF RIGHT SHOULDER: ICD-10-CM

## 2021-08-06 DIAGNOSIS — R74.8 ELEVATED LIVER ENZYMES: Primary | ICD-10-CM

## 2021-08-06 PROCEDURE — 99203 OFFICE O/P NEW LOW 30 MIN: CPT | Performed by: INTERNAL MEDICINE

## 2021-08-06 RX ORDER — CYCLOBENZAPRINE HCL 10 MG
10 TABLET ORAL NIGHTLY
Qty: 30 TABLET | Refills: 2 | Status: SHIPPED | OUTPATIENT
Start: 2021-08-06 | End: 2022-01-25

## 2021-08-06 NOTE — PATIENT INSTRUCTIONS
1. Right shoulder pain with adhesive capsulitis (frozen shoulder):  Related to prior trauma. This appears to be resolving. Possibly related to a prior rotator cuff tear. START taking cyclobenzaprine (Flexeril) 10 mg at bedtime. Continue with range of motion exercises. 2. Elevated liver enzymes on blood draws (March 2021 and December 2017). We will check a hepatitis panel.           Follow-up in two weeks

## 2021-08-23 ENCOUNTER — HOSPITAL ENCOUNTER (OUTPATIENT)
Age: 46
Discharge: HOME | End: 2021-08-23

## 2021-08-23 DIAGNOSIS — R74.8 ELEVATED LIVER ENZYMES: ICD-10-CM

## 2021-08-23 PROCEDURE — 36415 COLL VENOUS BLD VENIPUNCTURE: CPT

## 2021-08-23 PROCEDURE — 80074 ACUTE HEPATITIS PANEL: CPT

## 2021-08-24 LAB
HAV IGM SER IA-ACNC: ABNORMAL
HEPATITIS B CORE IGM ANTIBODY: ABNORMAL
HEPATITIS B SURFACE ANTIGEN INTERPRETATION: ABNORMAL
HEPATITIS C ANTIBODY INTERPRETATION: REACTIVE

## 2021-08-27 ENCOUNTER — OFFICE VISIT (OUTPATIENT)
Dept: INTERNAL MEDICINE CLINIC | Age: 46
End: 2021-08-27

## 2021-08-27 ENCOUNTER — HOSPITAL ENCOUNTER (OUTPATIENT)
Age: 46
Discharge: HOME | End: 2021-08-27

## 2021-08-27 VITALS
HEART RATE: 80 BPM | WEIGHT: 200 LBS | OXYGEN SATURATION: 96 % | SYSTOLIC BLOOD PRESSURE: 118 MMHG | DIASTOLIC BLOOD PRESSURE: 88 MMHG | BODY MASS INDEX: 28 KG/M2 | TEMPERATURE: 98 F | HEIGHT: 71 IN

## 2021-08-27 DIAGNOSIS — B18.2 CHRONIC HEPATITIS C WITHOUT HEPATIC COMA (HCC): Primary | ICD-10-CM

## 2021-08-27 DIAGNOSIS — M75.01 ADHESIVE CAPSULITIS OF RIGHT SHOULDER: ICD-10-CM

## 2021-08-27 DIAGNOSIS — B18.2 CHRONIC HEPATITIS C WITHOUT HEPATIC COMA (HCC): ICD-10-CM

## 2021-08-27 DIAGNOSIS — S39.012D STRAIN OF MUSCLE, FASCIA AND TENDON OF LOWER BACK, SUBSEQUENT ENCOUNTER: ICD-10-CM

## 2021-08-27 PROCEDURE — 87902 NFCT AGT GNTYP ALYS HEP C: CPT

## 2021-08-27 PROCEDURE — 99214 OFFICE O/P EST MOD 30 MIN: CPT | Performed by: INTERNAL MEDICINE

## 2021-08-27 PROCEDURE — 87522 HEPATITIS C REVRS TRNSCRPJ: CPT

## 2021-08-27 NOTE — PROGRESS NOTES
SUBJECTIVE:   Follow up from 8/6 for right shoulder pain, did not start taking Flexeril. Continued shoulder and left flank pain, worse over the past year. History of hepatitis C possibly treated with interferon in 2008, he apparently had jaundice at that time. History of present illness:  Right shoulder pain for the past year. Adhesive capsulitis right shoulder for about 18 months two years ago. Gradual increased range of motion over the past two months. Affects sleep due to spasms. History of palpitations 3/6 seen in the ED with negative workup. Elevated liver enzymes by labs drawn 3/6. Nonsmoker. Some left flank pain. Nausea in the mornings. Right wrist fracture requiring pins (2007). Right ankle surgery (1992). Seen in the ED (3/6/21) for palpitations which resolved after he stopped drinking [facility]. Notes frequent and soft stool. MEDICATIONS:  cyclobenzaprine (FLEXERIL) 10 MG tablet Take 1 tablet by mouth nightly (not yet filled script)       LABS: 03/06/2021  WBC 9.0   HGB 15.7      MCV 90.2        K 3.8      CO2 27   BUN 14   CREATININE 0.7 (L)   GLUCOSE 124 (H)     Calcium 9.8    Total Protein 7.8    Albumin 4.6    Albumin/Globulin Ratio 1.4    Total Bilirubin 0.8    Alkaline Phosphatase 49     (H)     AST 89 (H)     Globulin 3.2           OBJECTIVE:    /88   Pulse 80   Temp 98 °F (36.7 °C)   Ht 5' 11\" (1.803 m)   Wt 200 lb (90.7 kg)   SpO2 96%   BMI 27.89 kg/m²   HEENT:  Oropharynx clear, no lymphadenopathy,   LUNGS:  Clear and without wheezes  HEART:  Regular rhythm, no appreciable murmur  ABD:  Benign, active bowel sounds  EXT:  No edema, pulses palpable  NEURO:  No focal neurologic deficits noted. ASSESSMENT / PLAN:   1. Right shoulder pain with adhesive capsulitis (frozen shoulder):  Related to prior trauma. This appears to be resolving. Possibly related to a prior rotator cuff tear.   START taking cyclobenzaprine (Flexeril) 10 mg at bedtime. Continue with range of motion exercises. 2. Left flank muscle strain:  This should also be helped with Flexeril as above. Try moist heat (wet towel heated in the microwave). 3. Elevated liver enzymes on blood draws (March 2021 and December 2017). Hepatitis panel positive for hepatitis C. We will check a hepatitis C viral load with reflex to genotype which would dictate therapy.         Follow-up as needed or four weeks

## 2021-08-30 ENCOUNTER — TELEPHONE (OUTPATIENT)
Dept: INTERNAL MEDICINE CLINIC | Age: 46
End: 2021-08-30

## 2021-08-30 LAB
HCV QNT BY NAAT IU/ML: ABNORMAL IU/ML
HCV QNT BY NAAT LOG IU/ML: 3.94 LOG IU/ML
INTERPRETATION: DETECTED

## 2021-08-30 NOTE — TELEPHONE ENCOUNTER
PT called into the clinic and said he is moving into an apartment and needs a note stating that his dog is an emotional support dog.      Letter at the clinic pending

## 2021-08-30 NOTE — LETTER
[facility]  [facility] [id number] [facility]  Phone: [phone]  Fax: [phone]    Charis Brewer MD        August 30, 2021    Brigitte dog is an emotional support dog.        Sincerely,        Charis Brewer MD

## 2021-09-01 LAB — HEPATITIS C GENOTYPE: NORMAL

## 2021-09-03 ENCOUNTER — TELEPHONE (OUTPATIENT)
Dept: INTERNAL MEDICINE CLINIC | Age: 46
End: 2021-09-03

## 2021-09-03 NOTE — TELEPHONE ENCOUNTER
Pt called in stating he didn't see the letter taped to door. Pt went to the [facility] where he is seen. I spoke to patient and told him the letter was probably at the [facility] clinic where he called.

## 2021-10-01 ENCOUNTER — OFFICE VISIT (OUTPATIENT)
Dept: INTERNAL MEDICINE CLINIC | Age: 46
End: 2021-10-01

## 2021-10-01 VITALS
HEART RATE: 70 BPM | WEIGHT: 202 LBS | OXYGEN SATURATION: 96 % | BODY MASS INDEX: 28.28 KG/M2 | DIASTOLIC BLOOD PRESSURE: 84 MMHG | SYSTOLIC BLOOD PRESSURE: 120 MMHG | HEIGHT: 71 IN

## 2021-10-01 DIAGNOSIS — B18.2 CHRONIC HEPATITIS C WITHOUT HEPATIC COMA (HCC): Primary | ICD-10-CM

## 2021-10-01 DIAGNOSIS — R74.8 ELEVATED LIVER ENZYMES: ICD-10-CM

## 2021-10-01 DIAGNOSIS — S39.012D STRAIN OF MUSCLE, FASCIA AND TENDON OF LOWER BACK, SUBSEQUENT ENCOUNTER: ICD-10-CM

## 2021-10-01 DIAGNOSIS — M75.01 ADHESIVE CAPSULITIS OF RIGHT SHOULDER: ICD-10-CM

## 2021-10-01 PROCEDURE — 99214 OFFICE O/P EST MOD 30 MIN: CPT | Performed by: INTERNAL MEDICINE

## 2021-10-01 NOTE — PROGRESS NOTES
SUBJECTIVE:   Follow up from 8/27 for hepatitis C and shoulder pain. Range of motion improved. Taking Flexeril at night. History of present illness:  History of hepatitis C possibly treated with interferon in 2008, he apparently had jaundice at that time. Right shoulder pain for the past year. Meet Herder capsulitis right shoulder for about 18 months two years ago. Krishna Rhodes increased range of motion over the past two months.  Affects sleep due to spasms.  History of palpitations 3/6 seen in the ED with negative workup.   Elevated liver enzymes by labs drawn 3/6.  Nonsmoker.   Some left flank pain.  Nausea in the mornings.  Right wrist fracture requiring pins (2007). Right ankle surgery (1992). Seen in the ED (3/6/21) for palpitations which resolved after he stopped drinking [facility]. Notes frequent and soft stool. MEDICATIONS:  cyclobenzaprine (FLEXERIL) 10 MG tablet Take 1 tablet by mouth nightly       LABS: 03/06/2021  Calcium 9.8    Total Protein 7.8    Albumin 4.6    Albumin/Globulin Ratio 1.4    Total Bilirubin 0.8    Alkaline Phosphatase 49     (H)     AST 89 (H)     Globulin 3.2      LABS: 08-27/2021  Hepatitis C RNA 3.94 log IU/mL  Hepatitis C genotype 1a/1b    Hep A IgM Non-reactive    Hep B Core Ab, IgM Non-reactive    Hep B S Ag Interp Non-reactive    Hep C Ab Interp REACTIVE         OBJECTIVE:    /84   Pulse 70   Ht 5' 11\" (1.803 m)   Wt 202 lb (91.6 kg)   SpO2 96%   BMI 28.17 kg/m²   HEENT:  Oropharynx clear, no lymphadenopathy,   LUNGS:  Clear and without wheezes  HEART:  Regular rhythm, no appreciable murmur  ABD:  Benign, active bowel sounds  EXT:  No edema, pulses palpable  NEURO:  No focal neurologic deficits noted. ASSESSMENT / PLAN:   1. Hepatitis C, genotype 1a/1b with quantitative RNA 3.94 UI/mL. This can be treated. We will refer to gastroenterology for confirmation of treatment.     2. Right shoulder pain with adhesive capsulitis (frozen shoulder):  Related to prior trauma. Mostly resolved. Range of motion with \"concentric circles\" with both arms outstretched.    Continue taking cyclobenzaprine (Flexeril) 10 mg at bedtime.  Continue with range of motion exercises.         Follow-up after seen by gastroenterology  Refer gastroenterology in the Clinic for hepatitis C treatment evaluation

## 2021-10-27 ENCOUNTER — OFFICE VISIT (OUTPATIENT)
Dept: INTERNAL MEDICINE CLINIC | Age: 46
End: 2021-10-27

## 2021-10-27 VITALS
HEIGHT: 71 IN | BODY MASS INDEX: 29.12 KG/M2 | WEIGHT: 208 LBS | HEART RATE: 78 BPM | SYSTOLIC BLOOD PRESSURE: 127 MMHG | DIASTOLIC BLOOD PRESSURE: 86 MMHG | OXYGEN SATURATION: 97 %

## 2021-10-27 DIAGNOSIS — R76.8 HEPATITIS C ANTIBODY POSITIVE IN BLOOD: Primary | ICD-10-CM

## 2021-10-27 PROCEDURE — 99212 OFFICE O/P EST SF 10 MIN: CPT | Performed by: INTERNAL MEDICINE

## 2021-10-27 RX ORDER — VELPATASVIR AND SOFOSBUVIR 100; 400 MG/1; MG/1
1 TABLET, FILM COATED ORAL DAILY
COMMUNITY
Start: 2021-10-27 | End: 2021-10-27 | Stop reason: DRUGHIGH

## 2021-10-27 RX ORDER — VELPATASVIR AND SOFOSBUVIR 100; 400 MG/1; MG/1
1 TABLET, FILM COATED ORAL DAILY
Qty: 30 TABLET | Refills: 3 | Status: SHIPPED | OUTPATIENT
Start: 2021-10-27 | End: 2021-10-29 | Stop reason: SDUPTHER

## 2021-10-27 NOTE — PROGRESS NOTES
SUBJECTIVE:    Chief Complaint   Patient presents with    Hepatitis C     Patient needs treatment   Hepatitis C treated 2009    MEDICATIONS:  Current Outpatient Medications   Medication Sig Dispense Refill    cyclobenzaprine (FLEXERIL) 10 MG tablet Take 1 tablet by mouth nightly (Patient taking differently: Take 10 mg by mouth 2 times daily as needed ) 30 tablet 2     No current facility-administered medications for this visit. No results found for: LABA1C  Lab Results   Component Value Date    WBC 9.0 03/06/2021    HGB 15.7 03/06/2021     03/06/2021    MCV 90.2 03/06/2021     Lab Results   Component Value Date     03/06/2021    K 3.8 03/06/2021     03/06/2021    CO2 27 03/06/2021    BUN 14 03/06/2021    CREATININE 0.7 (L) 03/06/2021    GLUCOSE 124 (H) 03/06/2021       OBJECTIVE:    /86   Pulse 78   Ht 5' 11\" (1.803 m)   Wt 208 lb (94.3 kg)   SpO2 97%   BMI 29.01 kg/m²   HEENT:  Oropharynx clear, no lymphadenopathy,   LUNGS:  Clear and without wheezes  HEART:  Regular rhythm, no appreciable murmur  ABD:  Benign, active bowel sounds  EXT:  No edema, pulses palpable  NEURO:  No focal neurologic deficits noted. ASSESSMENT / PLAN:   3 78-year-old male patient seen in the clinic for hepatitis C. He does have genotype 1 A his RNA titer is elevated at 3.95. He was treated in 2009 with interferon. He at this point wants to undergo treatment. He does not have any further drug abuse or alcohol use. He has had tattoos placed long time ago. Is essentially asymptomatic. We will consider him for treatment with Epclusa. He is not keen on taking hepatitis A or hepatitis B vaccines. I have explained to him the risks of not taking these vaccines. 2. We will check alpha-fetoprotein and liver stiffness with elastography. 3. We will fill out forms for the drug therapy. Follow-up patient will need follow-up after he starts the treatment in 6 weeks time.   He will need to undergo treatment for 12 weeks.

## 2021-10-28 ENCOUNTER — HOSPITAL ENCOUNTER (OUTPATIENT)
Age: 46
Discharge: HOME | End: 2021-10-28

## 2021-10-28 DIAGNOSIS — R76.8 HEPATITIS C ANTIBODY POSITIVE IN BLOOD: ICD-10-CM

## 2021-10-28 PROCEDURE — 82105 ALPHA-FETOPROTEIN SERUM: CPT

## 2021-10-29 RX ORDER — VELPATASVIR AND SOFOSBUVIR 100; 400 MG/1; MG/1
1 TABLET, FILM COATED ORAL DAILY
Qty: 30 TABLET | Refills: 3 | Status: SHIPPED | OUTPATIENT
Start: 2021-10-29 | End: 2022-06-08 | Stop reason: ALTCHOICE

## 2021-11-01 LAB — AFP: 3.7 UG/L

## 2021-11-02 ENCOUNTER — TELEPHONE (OUTPATIENT)
Dept: INTERNAL MEDICINE CLINIC | Age: 46
End: 2021-11-02

## 2021-11-02 NOTE — TELEPHONE ENCOUNTER
Brianne Vinson 34 requested Rx for Deannie Record be changed from X16 weeks w/ 3 refills  to X12 Weeks w/2 refills. This was approved per form they sent.

## 2021-11-04 ENCOUNTER — HOSPITAL ENCOUNTER (OUTPATIENT)
Dept: ULTRASOUND IMAGING | Age: 46
Discharge: HOME | End: 2021-11-04

## 2021-11-04 DIAGNOSIS — R76.8 HEPATITIS C ANTIBODY POSITIVE IN BLOOD: ICD-10-CM

## 2021-11-04 PROCEDURE — 76981 USE PARENCHYMA: CPT

## 2021-11-04 PROCEDURE — 76705 ECHO EXAM OF ABDOMEN: CPT

## 2021-12-23 ENCOUNTER — HOSPITAL ENCOUNTER (EMERGENCY)
Age: 46
Discharge: HOME | End: 2021-12-23
Attending: EMERGENCY MEDICINE
Payer: COMMERCIAL

## 2021-12-23 ENCOUNTER — APPOINTMENT (OUTPATIENT)
Dept: CT IMAGING | Age: 46
End: 2021-12-23

## 2021-12-23 VITALS
RESPIRATION RATE: 18 BRPM | SYSTOLIC BLOOD PRESSURE: 132 MMHG | TEMPERATURE: 97.9 F | HEART RATE: 66 BPM | OXYGEN SATURATION: 96 % | WEIGHT: 208 LBS | DIASTOLIC BLOOD PRESSURE: 92 MMHG | BODY MASS INDEX: 29.01 KG/M2

## 2021-12-23 DIAGNOSIS — R51.9 NONINTRACTABLE HEADACHE, UNSPECIFIED CHRONICITY PATTERN, UNSPECIFIED HEADACHE TYPE: Primary | ICD-10-CM

## 2021-12-23 LAB
A/G RATIO: 1.4 (ref 1.1–2.2)
ALBUMIN SERPL-MCNC: 4.7 G/DL (ref 3.4–5)
ALP BLD-CCNC: 51 U/L (ref 40–129)
ALT SERPL-CCNC: 114 U/L (ref 10–40)
ANION GAP SERPL CALCULATED.3IONS-SCNC: 11 MMOL/L (ref 3–16)
AST SERPL-CCNC: 86 U/L (ref 15–37)
BASOPHILS ABSOLUTE: 0.1 K/UL (ref 0–0.2)
BASOPHILS RELATIVE PERCENT: 0.7 %
BILIRUB SERPL-MCNC: 1.2 MG/DL (ref 0–1)
BUN BLDV-MCNC: 14 MG/DL (ref 7–20)
CALCIUM SERPL-MCNC: 9.6 MG/DL (ref 8.3–10.6)
CHLORIDE BLD-SCNC: 99 MMOL/L (ref 99–110)
CO2: 27 MMOL/L (ref 21–32)
CREAT SERPL-MCNC: 0.7 MG/DL (ref 0.9–1.3)
EOSINOPHILS ABSOLUTE: 0.2 K/UL (ref 0–0.6)
EOSINOPHILS RELATIVE PERCENT: 1.9 %
GFR AFRICAN AMERICAN: >60
GFR NON-AFRICAN AMERICAN: >60
GLUCOSE BLD-MCNC: 123 MG/DL (ref 70–99)
HCT VFR BLD CALC: 45.3 % (ref 40.5–52.5)
HEMOGLOBIN: 16 G/DL (ref 13.5–17.5)
LYMPHOCYTES ABSOLUTE: 1.8 K/UL (ref 1–5.1)
LYMPHOCYTES RELATIVE PERCENT: 21.2 %
MCH RBC QN AUTO: 31.5 PG (ref 26–34)
MCHC RBC AUTO-ENTMCNC: 35.4 G/DL (ref 31–36)
MCV RBC AUTO: 88.9 FL (ref 80–100)
MONOCYTES ABSOLUTE: 0.8 K/UL (ref 0–1.3)
MONOCYTES RELATIVE PERCENT: 9.7 %
NEUTROPHILS ABSOLUTE: 5.5 K/UL (ref 1.7–7.7)
NEUTROPHILS RELATIVE PERCENT: 66.5 %
PDW BLD-RTO: 13 % (ref 12.4–15.4)
PLATELET # BLD: 235 K/UL (ref 135–450)
PMV BLD AUTO: 7.9 FL (ref 5–10.5)
POTASSIUM REFLEX MAGNESIUM: 3.9 MMOL/L (ref 3.5–5.1)
RBC # BLD: 5.1 M/UL (ref 4.2–5.9)
SODIUM BLD-SCNC: 137 MMOL/L (ref 136–145)
TOTAL PROTEIN: 8.1 G/DL (ref 6.4–8.2)
TROPONIN: <0.01 NG/ML
WBC # BLD: 8.3 K/UL (ref 4–11)

## 2021-12-23 PROCEDURE — 6360000002 HC RX W HCPCS: Performed by: EMERGENCY MEDICINE

## 2021-12-23 PROCEDURE — 70450 CT HEAD/BRAIN W/O DYE: CPT

## 2021-12-23 PROCEDURE — 96375 TX/PRO/DX INJ NEW DRUG ADDON: CPT

## 2021-12-23 PROCEDURE — 99284 EMERGENCY DEPT VISIT MOD MDM: CPT

## 2021-12-23 PROCEDURE — 6360000002 HC RX W HCPCS

## 2021-12-23 PROCEDURE — 2580000003 HC RX 258: Performed by: EMERGENCY MEDICINE

## 2021-12-23 PROCEDURE — 96374 THER/PROPH/DIAG INJ IV PUSH: CPT

## 2021-12-23 PROCEDURE — 93005 ELECTROCARDIOGRAM TRACING: CPT | Performed by: EMERGENCY MEDICINE

## 2021-12-23 PROCEDURE — 84484 ASSAY OF TROPONIN QUANT: CPT

## 2021-12-23 PROCEDURE — 80053 COMPREHEN METABOLIC PANEL: CPT

## 2021-12-23 PROCEDURE — 85025 COMPLETE CBC W/AUTO DIFF WBC: CPT

## 2021-12-23 RX ORDER — DIPHENHYDRAMINE HYDROCHLORIDE 50 MG/ML
25 INJECTION INTRAMUSCULAR; INTRAVENOUS ONCE
Status: COMPLETED | OUTPATIENT
Start: 2021-12-23 | End: 2021-12-23

## 2021-12-23 RX ORDER — KETOROLAC TROMETHAMINE 30 MG/ML
15 INJECTION, SOLUTION INTRAMUSCULAR; INTRAVENOUS ONCE
Status: COMPLETED | OUTPATIENT
Start: 2021-12-23 | End: 2021-12-23

## 2021-12-23 RX ORDER — KETOROLAC TROMETHAMINE 30 MG/ML
INJECTION, SOLUTION INTRAMUSCULAR; INTRAVENOUS
Status: COMPLETED
Start: 2021-12-23 | End: 2021-12-23

## 2021-12-23 RX ORDER — METOCLOPRAMIDE HYDROCHLORIDE 5 MG/ML
10 INJECTION INTRAMUSCULAR; INTRAVENOUS ONCE
Status: COMPLETED | OUTPATIENT
Start: 2021-12-23 | End: 2021-12-23

## 2021-12-23 RX ORDER — SODIUM CHLORIDE, SODIUM LACTATE, POTASSIUM CHLORIDE, AND CALCIUM CHLORIDE .6; .31; .03; .02 G/100ML; G/100ML; G/100ML; G/100ML
1000 INJECTION, SOLUTION INTRAVENOUS ONCE
Status: COMPLETED | OUTPATIENT
Start: 2021-12-23 | End: 2021-12-23

## 2021-12-23 RX ADMIN — KETOROLAC TROMETHAMINE 15 MG: 30 INJECTION, SOLUTION INTRAMUSCULAR; INTRAVENOUS at 16:39

## 2021-12-23 RX ADMIN — METOCLOPRAMIDE HYDROCHLORIDE 10 MG: 5 INJECTION INTRAMUSCULAR; INTRAVENOUS at 16:39

## 2021-12-23 RX ADMIN — SODIUM CHLORIDE, POTASSIUM CHLORIDE, SODIUM LACTATE AND CALCIUM CHLORIDE 1000 ML: 600; 310; 30; 20 INJECTION, SOLUTION INTRAVENOUS at 16:39

## 2021-12-23 RX ADMIN — DIPHENHYDRAMINE HYDROCHLORIDE 25 MG: 50 INJECTION, SOLUTION INTRAMUSCULAR; INTRAVENOUS at 16:39

## 2021-12-23 RX ADMIN — KETOROLAC TROMETHAMINE 15 MG: 30 INJECTION, SOLUTION INTRAMUSCULAR at 16:39

## 2021-12-23 ASSESSMENT — PAIN DESCRIPTION - LOCATION: LOCATION: HEAD

## 2021-12-23 ASSESSMENT — PAIN DESCRIPTION - PAIN TYPE: TYPE: ACUTE PAIN

## 2021-12-23 ASSESSMENT — PAIN SCALES - GENERAL
PAINLEVEL_OUTOF10: 4
PAINLEVEL_OUTOF10: 4

## 2021-12-23 NOTE — ED NOTES
--Patient provided with discharge instructions and any prescriptions. --Instructions, dosing, and follow-up appointments reviewed with patient/family. No further questions or needs at this time. --Vital signs and patient stable upon discharge. --Patient ambulatory to [facility].        Rakesh Craft RN  12/23/21 1522

## 2021-12-24 LAB
EKG ATRIAL RATE: 66 BPM
EKG DIAGNOSIS: NORMAL
EKG P AXIS: 62 DEGREES
EKG P-R INTERVAL: 140 MS
EKG Q-T INTERVAL: 402 MS
EKG QRS DURATION: 90 MS
EKG QTC CALCULATION (BAZETT): 421 MS
EKG R AXIS: 64 DEGREES
EKG T AXIS: 45 DEGREES
EKG VENTRICULAR RATE: 66 BPM

## 2021-12-24 PROCEDURE — 93010 ELECTROCARDIOGRAM REPORT: CPT | Performed by: INTERNAL MEDICINE

## 2021-12-24 NOTE — ED PROVIDER NOTES
CHIEF COMPLAINT  Migraine (progressively worsening headache over the morning. states nausea ) and Nausea      HISTORY OF PRESENT ILLNESS  Cole Valencia is a 40 y.o. male with a history of asthma, hepatitis C who presents emerge department for evaluation of headache. He states that he developed a headache after he woke up this morning. He states it is located bitemporally. He states that he used to get headaches fairly frequently. He states that the headache has gradually worsened and was not sudden on onset. He denies any head injury. He denies any loss of consciousness. He states that it is associate with nausea. No photophobia. He denies any weakness in his arms or legs. He did state that he had an episode of sweating along with this. No chest pain. He states that his headache is currently a 3 out of 10 and somewhat improved compared to earlier. No other complaints, modifying factors or associated symptoms. Past Medical History:   Diagnosis Date    Asthma     as child    Hepatitis C antibody positive in blood 08/23/2021     Past Surgical History:   Procedure Laterality Date    ANKLE SURGERY      HAND SURGERY       History reviewed. No pertinent family history.   Social History     Socioeconomic History    Marital status:      Spouse name: Not on file    Number of children: Not on file    Years of education: Not on file    Highest education level: Not on file   Occupational History    Not on file   Tobacco Use    Smoking status: Former Smoker    Smokeless tobacco: Former User     Types: Chew     Quit date: 9/6/2020   Substance and Sexual Activity    Alcohol use: Not Currently     Comment: 2-3 times a year    Drug use: Not Currently    Sexual activity: Yes     Partners: Female   Other Topics Concern    Not on file   Social History Narrative    Not on file     Social Determinants of Health     Financial Resource Strain:     Difficulty of Paying Living Expenses: Not on file   Food Insecurity:     Worried About Running Out of Food in the Last Year: Not on file    Abdullahi of Food in the Last Year: Not on file   Transportation Needs:     Lack of Transportation (Medical): Not on file    Lack of Transportation (Non-Medical): Not on file   Physical Activity:     Days of Exercise per Week: Not on file    Minutes of Exercise per Session: Not on file   Stress:     Feeling of Stress : Not on file   Social Connections:     Frequency of Communication with Friends and Family: Not on file    Frequency of Social Gatherings with Friends and Family: Not on file    Attends Evangelical Services: Not on file    Active Member of [address] [place] [address] [employer] or Organizations: Not on file    Attends Club or Organization Meetings: Not on file    Marital Status: Not on file   Intimate Partner Violence:     Fear of Current or Ex-Partner: Not on file    Emotionally Abused: Not on file    Physically Abused: Not on file    Sexually Abused: Not on file   Housing Stability:     Unable to Pay for Housing in the Last Year: Not on file    Number of [place] in the Last Year: Not on file    Unstable Housing in the Last Year: Not on file     No current facility-administered medications for this encounter. Current Outpatient Medications   Medication Sig Dispense Refill    Sofosbuvir-Velpatasvir (EPCLUSA) 400-100 MG TABS Take 1 tablet by mouth daily Indications: Hepatitis due to Hepatitis C Virus, Genotype 1a Take once daily 30 tablet 3    cyclobenzaprine (FLEXERIL) 10 MG tablet Take 1 tablet by mouth nightly (Patient taking differently: Take 10 mg by mouth 2 times daily as needed ) 30 tablet 2     No Known Allergies    REVIEW OF SYSTEMS  Positive and pertinent negatives as per HPI. All other systems were reviewed and are negative. PHYSICAL EXAM  BP (!) 132/92   Pulse 66   Temp 97.9 °F (36.6 °C) (Oral)   Resp 18   Wt 208 lb (94.3 kg)   SpO2 96%   BMI 29.01 kg/m²   GENERAL APPEARANCE: Awake and alert. Cooperative. HEAD: Normocephalic. Atraumatic. EYES: PERRL. EOM's grossly intact. ENT: Mucous membranes are moist.     HEART: RRR. No harsh murmurs. Intact radial pulses 2+ bilaterally. LUNGS: Respirations unlabored without accessory muscle use. Speaking comfortably in full sentences. ABDOMEN: Soft. Non-distended. Non-tender. No guarding or rebound. EXTREMITIES: No peripheral edema. No acute deformities. SKIN: Warm and dry. No acute rashes. NEUROLOGICAL: Alert and oriented X 3. CN II-XII intact. No gross facial drooping. Strength 5/5, sensation intact. No pronator drift. Normal coordination. Gait normal.       LABS  I have reviewed all labs for this visit.    Results for orders placed or performed during the hospital encounter of 12/23/21   CBC Auto Differential   Result Value Ref Range    WBC 8.3 4.0 - 11.0 K/uL    RBC 5.10 4.20 - 5.90 M/uL    Hemoglobin 16.0 13.5 - 17.5 g/dL    Hematocrit 45.3 40.5 - 52.5 %    MCV 88.9 80.0 - 100.0 fL    MCH 31.5 26.0 - 34.0 pg    MCHC 35.4 31.0 - 36.0 g/dL    RDW 13.0 12.4 - 15.4 %    Platelets 132 425 - 109 K/uL    MPV 7.9 5.0 - 10.5 fL    Neutrophils % 66.5 %    Lymphocytes % 21.2 %    Monocytes % 9.7 %    Eosinophils % 1.9 %    Basophils % 0.7 %    Neutrophils Absolute 5.5 1.7 - 7.7 K/uL    Lymphocytes Absolute 1.8 1.0 - 5.1 K/uL    Monocytes Absolute 0.8 0.0 - 1.3 K/uL    Eosinophils Absolute 0.2 0.0 - 0.6 K/uL    Basophils Absolute 0.1 0.0 - 0.2 K/uL   Comprehensive Metabolic Panel w/ Reflex to MG   Result Value Ref Range    Sodium 137 136 - 145 mmol/L    Potassium reflex Magnesium 3.9 3.5 - 5.1 mmol/L    Chloride 99 99 - 110 mmol/L    CO2 27 21 - 32 mmol/L    Anion Gap 11 3 - 16    Glucose 123 (H) 70 - 99 mg/dL    BUN 14 7 - 20 mg/dL    CREATININE 0.7 (L) 0.9 - 1.3 mg/dL    GFR Non-African American >60 >60    GFR African American >60 >60    Calcium 9.6 8.3 - 10.6 mg/dL    Total Protein 8.1 6.4 - 8.2 g/dL    Albumin 4.7 3.4 - 5.0 g/dL    Albumin/Globulin Ratio 1.4 1.1 - 2.2    Total Bilirubin 1.2 (H) 0.0 - 1.0 mg/dL    Alkaline Phosphatase 51 40 - 129 U/L     (H) 10 - 40 U/L    AST 86 (H) 15 - 37 U/L   Troponin   Result Value Ref Range    Troponin <0.01 <0.01 ng/mL   EKG 12 Lead   Result Value Ref Range    Ventricular Rate 66 BPM    Atrial Rate 66 BPM    P-R Interval 140 ms    QRS Duration 90 ms    Q-T Interval 402 ms    QTc Calculation (Bazett) 421 ms    P Axis 62 degrees    R Axis 64 degrees    T Axis 45 degrees    Diagnosis       Normal sinus rhythmNormal ECGWhen compared with ECG of 06-MAR-2021 21:53,No significant change was found       EKG  The Ekg interpreted by myself in the emergency department in the absence of a cardiologist.  normal sinus rhythm with a rate of 66  Axis is   Normal  QTc is  within an acceptable range  Intervals and Durations are unremarkable. No specific ST-T wave changes appreciated. No evidence of acute ischemia. No significant change from prior EKG dated 3/6/21      RADIOLOGY  X-RAYS:  I have reviewed radiologic plain film image(s). ALL OTHER NON-PLAIN FILM IMAGES SUCH AS CT, ULTRASOUND AND MRI HAVE BEEN READ BY THE RADIOLOGIST. CT Head WO Contrast   Final Result   No acute intracranial abnormality. ED COURSE/MDM  Patient seen and evaluated. Old records reviewed. Labs and imaging reviewed and results discussed with patient. Patient presenting with headache. He has no focal neurological deficits on exam.  Laboratory evaluation was unremarkable including troponin. CT head was obtained since he does not frequently get headaches and this was unremarkable. He was given a migraine cocktail with great improvement of his symptoms. He continues to have a nonfocal neurological exam and patient will be discharged home. He was advised on need for PCP follow-up. Unclear etiology of his headache at this time however it does not appear consistent with CVA, subarachnoid hemorrhage or other acute pathology.     The patient will be discharged from the emergency department. The patient was counseled on their diagnosis and any medications prescribed. They were advised on the need for PCP followup. They were counseled on the need to return to the emergency department if any of their symptoms were to worsen, change or have any other concerns. Discharged in stable condition. Patient was given scripts for the following medications. I counseled patient how to take these medications. Discharge Medication List as of 12/23/2021  5:33 PM          CLINICAL IMPRESSION  1. Nonintractable headache, unspecified chronicity pattern, unspecified headache type        Blood pressure (!) 132/92, pulse 66, temperature 97.9 °F (36.6 °C), temperature source Oral, resp. rate 18, weight 208 lb (94.3 kg), SpO2 96 %. DISPOSITION  Jessica Trevino was discharged to home in stable condition. This chart was generated in part by using Dragon Dictation system and may contain errors related to that system including errors in grammar, punctuation, and spelling, as well as words and phrases that may be inappropriate. If there are any questions or concerns please feel free to contact the dictating provider for clarification.      Ines Lockett MD  12/23/21 2685

## 2022-01-14 ENCOUNTER — TELEPHONE (OUTPATIENT)
Dept: INTERNAL MEDICINE CLINIC | Age: 47
End: 2022-01-14

## 2022-01-14 NOTE — TELEPHONE ENCOUNTER
Pt called stating the specialty pharmacy for the [facility] was needing our office to call and state where we wanted Pt's medication sent to his home or our office. I called and informed them to send to patients address and they confirmed and stated med would go to patient right away.

## 2022-01-18 ENCOUNTER — TELEPHONE (OUTPATIENT)
Dept: INTERNAL MEDICINE CLINIC | Age: 47
End: 2022-01-18

## 2022-01-18 NOTE — TELEPHONE ENCOUNTER
Pt received his Hep C treatment med. And is concerned about some symptoms he has started having over the past month. He states he has been experiencing Left side burning pain, sometimes it's a cold burning pain, his hands , and feet and lips will itch and turn red. Pt is concerned about having Hep B and taking the Epclusa since it states it states it would be bad to do. Pt knows he had the Hep B testing but just want to be sure before he starts med that you are aware of this and do you have any suggestions for his symptoms.  Please advise

## 2022-01-19 ENCOUNTER — HOSPITAL ENCOUNTER (EMERGENCY)
Age: 47
Discharge: HOME | End: 2022-01-19
Attending: STUDENT IN AN ORGANIZED HEALTH CARE EDUCATION/TRAINING PROGRAM
Payer: COMMERCIAL

## 2022-01-19 ENCOUNTER — APPOINTMENT (OUTPATIENT)
Dept: CT IMAGING | Age: 47
End: 2022-01-19

## 2022-01-19 ENCOUNTER — APPOINTMENT (OUTPATIENT)
Dept: GENERAL RADIOLOGY | Age: 47
End: 2022-01-19

## 2022-01-19 VITALS
WEIGHT: 200 LBS | RESPIRATION RATE: 16 BRPM | TEMPERATURE: 97.4 F | HEART RATE: 74 BPM | SYSTOLIC BLOOD PRESSURE: 129 MMHG | BODY MASS INDEX: 28 KG/M2 | OXYGEN SATURATION: 99 % | HEIGHT: 71 IN | DIASTOLIC BLOOD PRESSURE: 91 MMHG

## 2022-01-19 DIAGNOSIS — K92.1 HEMATOCHEZIA: Primary | ICD-10-CM

## 2022-01-19 LAB
A/G RATIO: 1.2 (ref 1.1–2.2)
ALBUMIN SERPL-MCNC: 4.4 G/DL (ref 3.4–5)
ALP BLD-CCNC: 44 U/L (ref 40–129)
ALT SERPL-CCNC: 114 U/L (ref 10–40)
ANION GAP SERPL CALCULATED.3IONS-SCNC: 12 MMOL/L (ref 3–16)
AST SERPL-CCNC: 109 U/L (ref 15–37)
BASOPHILS ABSOLUTE: 0.1 K/UL (ref 0–0.2)
BASOPHILS RELATIVE PERCENT: 0.6 %
BILIRUB SERPL-MCNC: 1.3 MG/DL (ref 0–1)
BILIRUBIN URINE: NEGATIVE
BLOOD, URINE: NEGATIVE
BUN BLDV-MCNC: 11 MG/DL (ref 7–20)
CALCIUM SERPL-MCNC: 9.1 MG/DL (ref 8.3–10.6)
CHLORIDE BLD-SCNC: 103 MMOL/L (ref 99–110)
CLARITY: CLEAR
CO2: 23 MMOL/L (ref 21–32)
COLOR: YELLOW
CREAT SERPL-MCNC: 0.6 MG/DL (ref 0.9–1.3)
EKG ATRIAL RATE: 70 BPM
EKG DIAGNOSIS: NORMAL
EKG P AXIS: 31 DEGREES
EKG P-R INTERVAL: 138 MS
EKG Q-T INTERVAL: 378 MS
EKG QRS DURATION: 96 MS
EKG QTC CALCULATION (BAZETT): 408 MS
EKG R AXIS: 38 DEGREES
EKG T AXIS: 9 DEGREES
EKG VENTRICULAR RATE: 70 BPM
EOSINOPHILS ABSOLUTE: 0.1 K/UL (ref 0–0.6)
EOSINOPHILS RELATIVE PERCENT: 1.1 %
GFR AFRICAN AMERICAN: >60
GFR NON-AFRICAN AMERICAN: >60
GLUCOSE BLD-MCNC: 114 MG/DL (ref 70–99)
GLUCOSE URINE: NEGATIVE MG/DL
HCT VFR BLD CALC: 47.1 % (ref 40.5–52.5)
HEMOGLOBIN: 16.7 G/DL (ref 13.5–17.5)
INR BLD: 1.14 (ref 0.88–1.12)
KETONES, URINE: NEGATIVE MG/DL
LEUKOCYTE ESTERASE, URINE: NEGATIVE
LIPASE: 49 U/L (ref 13–60)
LYMPHOCYTES ABSOLUTE: 3 K/UL (ref 1–5.1)
LYMPHOCYTES RELATIVE PERCENT: 29.5 %
MCH RBC QN AUTO: 31.7 PG (ref 26–34)
MCHC RBC AUTO-ENTMCNC: 35.5 G/DL (ref 31–36)
MCV RBC AUTO: 89.4 FL (ref 80–100)
MICROSCOPIC EXAMINATION: NORMAL
MONOCYTES ABSOLUTE: 1 K/UL (ref 0–1.3)
MONOCYTES RELATIVE PERCENT: 9.3 %
NEUTROPHILS ABSOLUTE: 6.1 K/UL (ref 1.7–7.7)
NEUTROPHILS RELATIVE PERCENT: 59.5 %
NITRITE, URINE: NEGATIVE
PDW BLD-RTO: 13 % (ref 12.4–15.4)
PH UA: 6 (ref 5–8)
PLATELET # BLD: 255 K/UL (ref 135–450)
PMV BLD AUTO: 8.1 FL (ref 5–10.5)
POTASSIUM REFLEX MAGNESIUM: 4.9 MMOL/L (ref 3.5–5.1)
PROTEIN UA: NEGATIVE MG/DL
PROTHROMBIN TIME: 12.9 SEC (ref 9.9–12.7)
RBC # BLD: 5.27 M/UL (ref 4.2–5.9)
SODIUM BLD-SCNC: 138 MMOL/L (ref 136–145)
SPECIFIC GRAVITY UA: 1.01 (ref 1–1.03)
TOTAL PROTEIN: 8.2 G/DL (ref 6.4–8.2)
TROPONIN: <0.01 NG/ML
URINE REFLEX TO CULTURE: NORMAL
URINE TYPE: NORMAL
UROBILINOGEN, URINE: 0.2 E.U./DL
WBC # BLD: 10.2 K/UL (ref 4–11)

## 2022-01-19 PROCEDURE — 80053 COMPREHEN METABOLIC PANEL: CPT

## 2022-01-19 PROCEDURE — 84484 ASSAY OF TROPONIN QUANT: CPT

## 2022-01-19 PROCEDURE — 85610 PROTHROMBIN TIME: CPT

## 2022-01-19 PROCEDURE — 71045 X-RAY EXAM CHEST 1 VIEW: CPT

## 2022-01-19 PROCEDURE — 6360000004 HC RX CONTRAST MEDICATION: Performed by: STUDENT IN AN ORGANIZED HEALTH CARE EDUCATION/TRAINING PROGRAM

## 2022-01-19 PROCEDURE — 93005 ELECTROCARDIOGRAM TRACING: CPT | Performed by: STUDENT IN AN ORGANIZED HEALTH CARE EDUCATION/TRAINING PROGRAM

## 2022-01-19 PROCEDURE — 99283 EMERGENCY DEPT VISIT LOW MDM: CPT

## 2022-01-19 PROCEDURE — 93010 ELECTROCARDIOGRAM REPORT: CPT | Performed by: INTERNAL MEDICINE

## 2022-01-19 PROCEDURE — 81003 URINALYSIS AUTO W/O SCOPE: CPT

## 2022-01-19 PROCEDURE — 85025 COMPLETE CBC W/AUTO DIFF WBC: CPT

## 2022-01-19 PROCEDURE — 2580000003 HC RX 258: Performed by: STUDENT IN AN ORGANIZED HEALTH CARE EDUCATION/TRAINING PROGRAM

## 2022-01-19 PROCEDURE — 74177 CT ABD & PELVIS W/CONTRAST: CPT

## 2022-01-19 PROCEDURE — 83690 ASSAY OF LIPASE: CPT

## 2022-01-19 RX ORDER — 0.9 % SODIUM CHLORIDE 0.9 %
1000 INTRAVENOUS SOLUTION INTRAVENOUS ONCE
Status: COMPLETED | OUTPATIENT
Start: 2022-01-19 | End: 2022-01-19

## 2022-01-19 RX ORDER — ACETAMINOPHEN 500 MG
1000 TABLET ORAL ONCE
Status: DISCONTINUED | OUTPATIENT
Start: 2022-01-19 | End: 2022-01-19 | Stop reason: HOSPADM

## 2022-01-19 RX ADMIN — IOPAMIDOL 75 ML: 755 INJECTION, SOLUTION INTRAVENOUS at 13:08

## 2022-01-19 RX ADMIN — SODIUM CHLORIDE 1000 ML: 9 INJECTION, SOLUTION INTRAVENOUS at 12:07

## 2022-01-19 ASSESSMENT — PAIN DESCRIPTION - LOCATION: LOCATION: ABDOMEN

## 2022-01-19 ASSESSMENT — PAIN SCALES - GENERAL: PAINLEVEL_OUTOF10: 2

## 2022-01-19 NOTE — ED PROVIDER NOTES
[address] ED      CHIEF COMPLAINT  Abdominal Pain (Stabbing pain in abdomen 2 nights ago), Diarrhea (diarrhea started a year ago off and on but states that he has had a couple episodes of blood in his stool since yesterday. ), and Flank Pain (left sided flank pain )       HISTORY OF PRESENT ILLNESS  Geovany Antunez is a 40 y.o. male  who presents to the ED complaining of abdominal pain, diarrhea, bloody stools. Patient states that he is been having left upper quadrant abdominal pain intermittently for years, however became sharp and stabbing and severe 2 nights ago. He states that this pain has improved, but he now has an achy left lower quadrant abdominal pain that is different than previous. Over the last 2 days he is also been having bloody stools, passing clots. He describes it as stool mixed with blood and clots. Denies any chest pain, shortness of breath, lightheadedness. 2 nights ago he did endorse subjective fevers and chills as well as diffuse pruritus, which resolved yesterday. He does have a history of hepatitis C. He does not take any blood thinners. No other complaints, modifying factors or associated symptoms. I have reviewed the following from the nursing documentation. Past Medical History:   Diagnosis Date    Asthma     as child    Hepatitis C antibody positive in blood 08/23/2021     Past Surgical History:   Procedure Laterality Date    ANKLE SURGERY      HAND SURGERY       History reviewed. No pertinent family history.   Social History     Socioeconomic History    Marital status:      Spouse name: Not on file    Number of children: Not on file    Years of education: Not on file    Highest education level: Not on file   Occupational History    Not on file   Tobacco Use    Smoking status: Former Smoker    Smokeless tobacco: Former User     Types: Chew     Quit date: 9/6/2020   Substance and Sexual Activity    Alcohol use: Not Currently     Comment: 2-3 times a year    Drug use: Yes     Types: Marijuana Joseph Free)    Sexual activity: Yes     Partners: Female   Other Topics Concern    Not on file   Social History Narrative    Not on file     Social Determinants of Health     Financial Resource Strain:     Difficulty of Paying Living Expenses: Not on file   Food Insecurity:     Worried About Running Out of Food in the Last Year: Not on file    Abdullahi of Food in the Last Year: Not on file   Transportation Needs:     Lack of Transportation (Medical): Not on file    Lack of Transportation (Non-Medical):  Not on file   Physical Activity:     Days of Exercise per Week: Not on file    Minutes of Exercise per Session: Not on file   Stress:     Feeling of Stress : Not on file   Social Connections:     Frequency of Communication with Friends and Family: Not on file    Frequency of Social Gatherings with Friends and Family: Not on file    Attends Spiritism Services: Not on file    Active Member of [address] [place] [address] or Organizations: Not on file    Attends Club or Organization Meetings: Not on file    Marital Status: Not on file   Intimate Partner Violence:     Fear of Current or Ex-Partner: Not on file    Emotionally Abused: Not on file    Physically Abused: Not on file    Sexually Abused: Not on file   Housing Stability:     Unable to Pay for Housing in the Last Year: Not on file    Number of [place] in the Last Year: Not on file    Unstable Housing in the Last Year: Not on file     Current Facility-Administered Medications   Medication Dose Route Frequency Provider Last Rate Last Admin    0.9 % sodium chloride bolus  1,000 mL IntraVENous Once [place] Graven, DO        acetaminophen (TYLENOL) tablet 1,000 mg  1,000 mg Oral Once [place] Gra[place], DO         Current Outpatient Medications   Medication Sig Dispense Refill    Sofosbuvir-Velpatasvir (EPCLUSA) 400-100 MG TABS Take 1 tablet by mouth daily Indications: Hepatitis due to Hepatitis C Virus, Genotype 1a Take once daily 30 tablet 3    cyclobenzaprine (FLEXERIL) 10 MG tablet Take 1 tablet by mouth nightly (Patient taking differently: Take 10 mg by mouth 2 times daily as needed ) 30 tablet 2     No Known Allergies    REVIEW OF SYSTEMS  10 systems reviewed, pertinent positives per HPI otherwise noted to be negative. PHYSICAL EXAM  BP (!) 135/97   Pulse 88   Temp 97.4 °F (36.3 °C) (Tympanic)   Resp 16   Ht 5' 11\" (1.803 m)   Wt 200 lb (90.7 kg)   SpO2 97%   BMI 27.89 kg/m²    General: Appears well. Alert  HEENT: Head atraumatic, Eyes normal inspection, PERRL. Normal ENT inspection, Pharynx normal. No signs of dehydration  NECK: Normal inspection  RESPIRATORY: Normal breath sounds. No chest wall tenderness. No respiratory distress  CVS: Heart rate and rhythm regular. No Murmurs  ABDOMEN/GI: Soft, mild left lower quadrant tenderness, No distention  BACK: Normal inspection  EXTREMITIES: Non-Tender. Full ROM. Normal appearance. No Pedal edema  NEURO: Alert and oriented. Sensation normal. Motor normal  PSYCH: Mood normal. Affect normal.  SKIN: Color normal. No rash. Warm, Dry    LABS  I have reviewed all labs for this visit.    Results for orders placed or performed during the hospital encounter of 01/19/22   CBC Auto Differential   Result Value Ref Range    WBC 10.2 4.0 - 11.0 K/uL    RBC 5.27 4.20 - 5.90 M/uL    Hemoglobin 16.7 13.5 - 17.5 g/dL    Hematocrit 47.1 40.5 - 52.5 %    MCV 89.4 80.0 - 100.0 fL    MCH 31.7 26.0 - 34.0 pg    MCHC 35.5 31.0 - 36.0 g/dL    RDW 13.0 12.4 - 15.4 %    Platelets 173 089 - 465 K/uL    MPV 8.1 5.0 - 10.5 fL    Neutrophils % 59.5 %    Lymphocytes % 29.5 %    Monocytes % 9.3 %    Eosinophils % 1.1 %    Basophils % 0.6 %    Neutrophils Absolute 6.1 1.7 - 7.7 K/uL    Lymphocytes Absolute 3.0 1.0 - 5.1 K/uL    Monocytes Absolute 1.0 0.0 - 1.3 K/uL    Eosinophils Absolute 0.1 0.0 - 0.6 K/uL    Basophils Absolute 0.1 0.0 - 0.2 K/uL   Comprehensive Metabolic Panel w/ Reflex to MG interpreted by me shows  Sinus rhythm with a rate of 70 bpm. Normal axis. No acute injury pattern. , QRS 96, QTc 408. No significant change from prior EKG dated 12/23/2021    RADIOLOGY  CT ABDOMEN PELVIS W IV CONTRAST Additional Contrast? None   Preliminary Result   1. No acute findings within the abdomen or pelvis. No evidence of   appendicitis. 2. Mild hepatic steatosis. Stable mild splenomegaly. XR CHEST PORTABLE   Final Result   No radiographic evidence of acute pulmonary disease. ED COURSE/MDM  Patient seen and evaluated. Old records reviewed. Labs and imaging reviewed and results discussed with patient. Presenting with abdominal pain that is improving and hematochezia. Lab work is unremarkable. CT scan shows no acute process. Patient is reassured. We did discuss the possibility of internal hemorrhoids, diverticular bleed. I think serious cause of hematochezia is much less likely considering his normal lab work and vital signs. At this point I believe he is stable for discharge home with follow-up to his PCP and gastroenterology. He is given return precautions for severe worsening pain, increasing bloody stools, nausea and vomiting, or other concerning symptoms. Verbalized understanding and agreed with this plan. During the patient's ED course, the patient was given:  Medications   acetaminophen (TYLENOL) tablet 1,000 mg (1,000 mg Oral Not Given 1/19/22 1211)   0.9 % sodium chloride bolus (0 mLs IntraVENous Stopped 1/19/22 1302)   iopamidol (ISOVUE-370) 76 % injection 75 mL (75 mLs IntraVENous Given 1/19/22 1308)        CLINICAL IMPRESSION  1. Hematochezia        Blood pressure (!) 135/97, pulse 88, temperature 97.4 °F (36.3 °C), temperature source Tympanic, resp. rate 16, height 5' 11\" (1.803 m), weight 200 lb (90.7 kg), SpO2 97 %. DISPOSITION  Cami Oneal was discharged to home in stable condition. Patient was given scripts for the following medications. I counseled patient how to take these medications. New Prescriptions    No medications on file       Follow-up with:  Cortes Thrasher MD  [facility] [address]  [facility]  [phone]    Call in 3 days  As needed    Nuno Thomas MD  [address] [facility]  [address] [facility]  [phone]    Call in 2 days        DISCLAIMER: This chart was created using Dragon dictation software. Efforts were made by me to ensure accuracy, however some errors may be present due to limitations of this technology and occasionally words are not transcribed correctly.        Coleman San DO  01/19/22 1413

## 2022-01-19 NOTE — Clinical Note
Alisa Hernandez was seen and treated in our emergency department on 1/19/2022. He may return to work on 01/20/2022. If you have any questions or concerns, please don't hesitate to call.       Ju Byers, DO

## 2022-01-24 ENCOUNTER — TELEPHONE (OUTPATIENT)
Dept: INTERNAL MEDICINE CLINIC | Age: 47
End: 2022-01-24

## 2022-01-25 ENCOUNTER — OFFICE VISIT (OUTPATIENT)
Dept: INTERNAL MEDICINE CLINIC | Age: 47
End: 2022-01-25

## 2022-01-25 VITALS
OXYGEN SATURATION: 98 % | WEIGHT: 199 LBS | HEART RATE: 76 BPM | DIASTOLIC BLOOD PRESSURE: 100 MMHG | HEIGHT: 71 IN | SYSTOLIC BLOOD PRESSURE: 155 MMHG | BODY MASS INDEX: 27.86 KG/M2

## 2022-01-25 DIAGNOSIS — R74.8 ELEVATED LIVER ENZYMES: ICD-10-CM

## 2022-01-25 DIAGNOSIS — K58.0 IRRITABLE BOWEL SYNDROME WITH DIARRHEA: ICD-10-CM

## 2022-01-25 DIAGNOSIS — J45.20 MILD INTERMITTENT ASTHMA WITHOUT COMPLICATION: Primary | ICD-10-CM

## 2022-01-25 DIAGNOSIS — B18.2 CHRONIC HEPATITIS C WITHOUT HEPATIC COMA (HCC): ICD-10-CM

## 2022-01-25 DIAGNOSIS — M75.01 ADHESIVE CAPSULITIS OF RIGHT SHOULDER: ICD-10-CM

## 2022-01-25 LAB
EXPIRATORY TIME: NORMAL
FEF 25-75% %PRED-PRE: NORMAL
FEF 25-75% PRED: NORMAL
FEF 25-75%-PRE: NORMAL
FEV1 %PRED-PRE: 93 %
FEV1 PRED: NORMAL
FEV1/FVC %PRED-PRE: NORMAL
FEV1/FVC PRED: NORMAL
FEV1/FVC: 97 %
FEV1: NORMAL
FVC %PRED-PRE: NORMAL
FVC PRED: NORMAL
FVC: NORMAL
PEF %PRED-PRE: NORMAL
PEF PRED: NORMAL
PEF-PRE: NORMAL

## 2022-01-25 PROCEDURE — 99214 OFFICE O/P EST MOD 30 MIN: CPT | Performed by: INTERNAL MEDICINE

## 2022-01-25 RX ORDER — DICYCLOMINE HYDROCHLORIDE 10 MG/1
10 CAPSULE ORAL 2 TIMES DAILY
Qty: 60 CAPSULE | Refills: 2 | Status: SHIPPED | OUTPATIENT
Start: 2022-01-25 | End: 2022-02-15 | Stop reason: SDUPTHER

## 2022-01-25 ASSESSMENT — PULMONARY FUNCTION TESTS
FEV1_PERCENT_PREDICTED_PRE: 93
FEV1/FVC: 97

## 2022-01-25 NOTE — PROGRESS NOTES
SUBJECTIVE:   Follow up from 10/1/21 on for hepatitis C. Prescribed Epclusa but has not started yet. Left upper quadrant abdominal pain and seen at emergency department (1/19) with negative CT scan. History of present illness:  History of hepatitis C possibly treated with interferon in 2008, he apparently had jaundice at that time. Right shoulder pain for the past year. [place] capsulitis right shoulder for about 18 months two years ago. Joseph Shames increased range of motion over the past two months.  Affects sleep due to spasms.  History of palpitations 3/6 seen in the ED with negative workup.   Elevated liver enzymes by labs drawn 3/6.  Nonsmoker.   Some left flank pain.  Nausea in the mornings.  Right wrist fracture requiring pins (2007).  Right ankle surgery (1992).   Seen in the ED (3/6/21) for palpitations which resolved after he stopped drinking [facility].  Notes frequent and soft stool.      MEDICATIONS:  Sofosbuvir-Velpatasvir (EPCLUSA) 400-100 MG TABS Take 1 tablet by mouth daily Indications: Hepatitis due to Hepatitis C Virus, Genotype 1a Take once daily (not yet started)       LABS: 01/19/2022  WBC 10.2   HGB 16.7      MCV 89.4        K 4.9      CO2 23   BUN 11   CREATININE 0.6 (L)   GLUCOSE 114 (H)     Calcium 9.1    Total Protein 8.2    Albumin 4.4    Total Bilirubin 1.3    Alkaline Phosphatase 44     (H)       LABS: 08/27/2021  Hepatitis C RNA 3.94 log IU/mL  Hepatitis C genotype 1a/1b  AFP (10/28/21) 3.7 ug/L     Hep A IgM Non-reactive    Hep B Core Ab, IgM Non-reactive    Hep B S Ag Interp Non-reactive    Hep C Ab Interp REACTIVE      CT abd / pelvis (1/19) No acute findings within the abdomen or pelvis.  No evidence of appendicitis. Mild hepatic steatosis.  Stable mild splenomegaly.       OBJECTIVE:    BP (!) 149/94 (Site: Right Upper Arm)   Pulse 76   Ht 5' 11\" (1.803 m)   Wt 199 lb (90.3 kg)   SpO2 98%   BMI 27.75 kg/m²   HEENT:  Oropharynx clear, no lymphadenopathy,   LUNGS:  Clear and without wheezes  HEART:  Regular rhythm, no appreciable murmur  ABD:  Benign, active bowel sounds  EXT:  No edema, pulses palpable  NEURO:  No focal neurologic deficits noted. ASSESSMENT / PLAN:   1. Hepatitis C, genotype 1a/1b with quantitative RNA 3.94 UI/mL. START taking Sofosbuvir-Velpatasvir (Epclusa) 400-100 MG tablet ONCE per day. 2. Irritable bowel syndrome:  Take dicyclomine (Bentyl) 10 mg capsule twice per day (morning and bedtime). 3. Right shoulder pain with adhesive capsulitis (frozen shoulder):  Related to prior trauma. Mostly resolved. Range of motion with \"concentric circles\" with both arms outstretched.    Continue taking cyclobenzaprine (Flexeril) 10 mg at bedtime.  Continue with range of motion exercises.        Follow-up in three weeks, check routine blood tests on Epclusa

## 2022-01-25 NOTE — PATIENT INSTRUCTIONS
1. Hepatitis C, genotype 1a/1b with quantitative RNA 3.94 UI/mL. START taking Sofosbuvir-Velpatasvir (Epclusa) 400-100 MG tablet ONCE per day. 2. Irritable bowel syndrome:  Take dicyclomine (Bentyl) 10 mg capsule twice per day (morning and bedtime). 3. Right shoulder pain with adhesive capsulitis (frozen shoulder):  Related to prior trauma. Mostly resolved. Range of motion with \"concentric circles\" with both arms outstretched.    Continue taking cyclobenzaprine (Flexeril) 10 mg at bedtime.  Continue with range of motion exercises.        Follow-up in three weeks, check routine blood tests on Epclusa

## 2022-02-15 ENCOUNTER — OFFICE VISIT (OUTPATIENT)
Dept: INTERNAL MEDICINE CLINIC | Age: 47
End: 2022-02-15

## 2022-02-15 VITALS
TEMPERATURE: 98.7 F | SYSTOLIC BLOOD PRESSURE: 113 MMHG | HEART RATE: 82 BPM | WEIGHT: 212.2 LBS | OXYGEN SATURATION: 96 % | BODY MASS INDEX: 29.71 KG/M2 | HEIGHT: 71 IN | DIASTOLIC BLOOD PRESSURE: 78 MMHG

## 2022-02-15 DIAGNOSIS — K58.0 IRRITABLE BOWEL SYNDROME WITH DIARRHEA: ICD-10-CM

## 2022-02-15 DIAGNOSIS — B18.2 CHRONIC HEPATITIS C WITHOUT HEPATIC COMA (HCC): Primary | ICD-10-CM

## 2022-02-15 PROCEDURE — 99213 OFFICE O/P EST LOW 20 MIN: CPT | Performed by: INTERNAL MEDICINE

## 2022-02-15 RX ORDER — DICYCLOMINE HYDROCHLORIDE 10 MG/1
10 CAPSULE ORAL 2 TIMES DAILY
Qty: 60 CAPSULE | Refills: 2 | Status: SHIPPED | OUTPATIENT
Start: 2022-02-15 | End: 2022-04-20

## 2022-02-15 NOTE — PATIENT INSTRUCTIONS
1. Hepatitis C, genotype 1a/1b with quantitative RNA 3.94 UI/mL.   Continue taking Sofosbuvir-Velpatasvir (Epclusa) 400-100 MG tablet ONCE per day to complete 12 weeks (three months). Check labs (CBC, CMP, INR). 2. Irritable bowel syndrome with left lower abdominal spasms:  START taking dicyclomine (Bentyl) 10 mg capsule twice per day (morning and bedtime).         Follow-up in two months  LABS: CBC, CMP, INR

## 2022-02-15 NOTE — PROGRESS NOTES
SUBJECTIVE:   Follow up from 1/25 for hepatitis C on Epclusa (Sofosbuvir-Velpatasvir) for the past 20 days. Already has second month of tablets sent to his house (2/11). Diarrhea predominant irritable bowel resolved without taking dicyclomine but still has left lower quadrant cramping pain and spasms, resolved after bowel movement. Left upper quadrant abdominal pain and seen at emergency department (1/19) with negative CT scan.      History of present illness:  History of hepatitis C possibly treated with interferon in 2008, he apparently had jaundice at that time. Right shoulder pain for the past year. [place] capsulitis right shoulder for about 18 months two years ago. [place] Joselin increased range of motion over the past two months.  Affects sleep due to spasms.  History of palpitations 3/6 seen in the ED with negative workup.   Elevated liver enzymes by labs drawn 3/6.  Nonsmoker.   Some left flank pain.  Nausea in the mornings.  Right wrist fracture requiring pins (2007).  Right ankle surgery (1992).   Seen in the ED (3/6/21) for palpitations which resolved after he stopped drinking [facility].  Notes frequent and soft stool.      MEDICATIONS:  Sofosbuvir-Velpatasvir (EPCLUSA) 400-100 MG TABS Take 1 tablet by mouth daily Indications: Hepatitis due to Hepatitis C Virus, Genotype 1a Take once daily   dicyclomine (BENTYL) 10 MG capsule Take 1 capsule by mouth 2 times daily (Patient not taking: Reported on 2/15/2022)       LABS: 08/27/2021  Hepatitis C RNA 3.94 log IU/mL  Hepatitis C genotype 1a/1b  AFP (10/28/21) 3.7 ug/L  INR (1/19) 1.14     Hep A IgM Non-reactive    Hep B Core Ab, IgM Non-reactive    Hep B S Ag Interp Non-reactive    Hep C Ab Interp REACTIVE      CT abd / pelvis (1/19) No acute findings within the abdomen or pelvis.  No evidence of appendicitis. Mild hepatic steatosis. Stable mild splenomegaly.       OBJECTIVE:    /78   Pulse 82   Temp 98.7 °F (37.1 °C)   Ht 5' 11\" (1.803 m)   Wt 212 lb 3.2 oz (96.3 kg)   SpO2 96%   BMI 29.60 kg/m²   HEENT:  Oropharynx clear, no lymphadenopathy,   LUNGS:  Clear and without wheezes  HEART:  Regular rhythm, no appreciable murmur  ABD:  Benign, active bowel sounds  EXT:  No edema, pulses palpable  NEURO:  No focal neurologic deficits noted. ASSESSMENT / PLAN:   1. Hepatitis C, genotype 1a/1b with quantitative RNA 3.94 UI/mL.   Continue taking Sofosbuvir-Velpatasvir (Epclusa) 400-100 MG tablet ONCE per day to complete 12 weeks (three months). Check labs (CBC, CMP, INR). 2. Irritable bowel syndrome with left lower abdominal spasms:  START taking dicyclomine (Bentyl) 10 mg capsule twice per day (morning and bedtime).         Follow-up in two months  LABS: CBC, CMP, INR

## 2022-03-02 ENCOUNTER — HOSPITAL ENCOUNTER (OUTPATIENT)
Age: 47
Discharge: HOME | End: 2022-03-02

## 2022-03-02 DIAGNOSIS — B18.2 CHRONIC HEPATITIS C WITHOUT HEPATIC COMA (HCC): ICD-10-CM

## 2022-03-02 LAB
A/G RATIO: 1.5 (ref 1.1–2.2)
ALBUMIN SERPL-MCNC: 4.7 G/DL (ref 3.4–5)
ALP BLD-CCNC: 46 U/L (ref 40–129)
ALT SERPL-CCNC: 75 U/L (ref 10–40)
ANION GAP SERPL CALCULATED.3IONS-SCNC: 11 MMOL/L (ref 3–16)
AST SERPL-CCNC: 60 U/L (ref 15–37)
BASOPHILS ABSOLUTE: 0.1 K/UL (ref 0–0.2)
BASOPHILS RELATIVE PERCENT: 0.8 %
BILIRUB SERPL-MCNC: 0.7 MG/DL (ref 0–1)
BUN BLDV-MCNC: 12 MG/DL (ref 7–20)
CALCIUM SERPL-MCNC: 10 MG/DL (ref 8.3–10.6)
CHLORIDE BLD-SCNC: 102 MMOL/L (ref 99–110)
CO2: 26 MMOL/L (ref 21–32)
CREAT SERPL-MCNC: 0.7 MG/DL (ref 0.9–1.3)
EOSINOPHILS ABSOLUTE: 0.2 K/UL (ref 0–0.6)
EOSINOPHILS RELATIVE PERCENT: 2.4 %
GFR AFRICAN AMERICAN: >60
GFR NON-AFRICAN AMERICAN: >60
GLUCOSE BLD-MCNC: 150 MG/DL (ref 70–99)
HCT VFR BLD CALC: 46.4 % (ref 40.5–52.5)
HEMOGLOBIN: 16.3 G/DL (ref 13.5–17.5)
INR BLD: 1.07 (ref 0.88–1.12)
LYMPHOCYTES ABSOLUTE: 2.4 K/UL (ref 1–5.1)
LYMPHOCYTES RELATIVE PERCENT: 33.6 %
MCH RBC QN AUTO: 31.4 PG (ref 26–34)
MCHC RBC AUTO-ENTMCNC: 35 G/DL (ref 31–36)
MCV RBC AUTO: 89.7 FL (ref 80–100)
MONOCYTES ABSOLUTE: 0.5 K/UL (ref 0–1.3)
MONOCYTES RELATIVE PERCENT: 7.6 %
NEUTROPHILS ABSOLUTE: 4 K/UL (ref 1.7–7.7)
NEUTROPHILS RELATIVE PERCENT: 55.6 %
PDW BLD-RTO: 13 % (ref 12.4–15.4)
PLATELET # BLD: 204 K/UL (ref 135–450)
PMV BLD AUTO: 9.3 FL (ref 5–10.5)
POTASSIUM SERPL-SCNC: 4.6 MMOL/L (ref 3.5–5.1)
PROTHROMBIN TIME: 12.1 SEC (ref 9.9–12.7)
RBC # BLD: 5.18 M/UL (ref 4.2–5.9)
SODIUM BLD-SCNC: 139 MMOL/L (ref 136–145)
TOTAL PROTEIN: 7.9 G/DL (ref 6.4–8.2)
WBC # BLD: 7.2 K/UL (ref 4–11)

## 2022-03-02 PROCEDURE — 85610 PROTHROMBIN TIME: CPT

## 2022-03-02 PROCEDURE — 36415 COLL VENOUS BLD VENIPUNCTURE: CPT

## 2022-03-02 PROCEDURE — 85025 COMPLETE CBC W/AUTO DIFF WBC: CPT

## 2022-03-02 PROCEDURE — 80053 COMPREHEN METABOLIC PANEL: CPT

## 2022-03-28 NOTE — PROGRESS NOTES
of appendicitis. Mild hepatic steatosis. Stable mild splenomegaly. Last Encounter Reviewed: none  Pertinent PMH, FH, SH is reviewed below. Last EGD: none  Last Colonoscopy: none    Review of available records reveals:   Wt Readings from Last 50 Encounters:   03/29/22 205 lb (93 kg)   02/15/22 212 lb 3.2 oz (96.3 kg)   01/25/22 199 lb (90.3 kg)   01/19/22 200 lb (90.7 kg)   12/23/21 208 lb (94.3 kg)   10/27/21 208 lb (94.3 kg)   10/01/21 202 lb (91.6 kg)   08/27/21 200 lb (90.7 kg)   08/06/21 200 lb (90.7 kg)   03/06/21 220 lb (99.8 kg)   05/27/20 225 lb (102.1 kg)   05/12/20 220 lb (99.8 kg)   12/12/19 225 lb (102.1 kg)   12/31/18 225 lb (102.1 kg)   11/05/18 225 lb (102.1 kg)   09/25/18 225 lb (102.1 kg)   04/09/18 226 lb 10.1 oz (102.8 kg)   03/29/18 226 lb 9.6 oz (102.8 kg)   12/30/17 238 lb (108 kg)   11/26/17 238 lb (108 kg)   08/30/17 232 lb (105.2 kg)   08/27/17 228 lb (103.4 kg)   05/22/17 230 lb (104.3 kg)       No components found for: HGBA1C  BP Readings from Last 3 Encounters:   03/29/22 (!) 150/76   02/15/22 113/78   01/25/22 (!) 155/100     Health Maintenance   Topic Date Due    Hepatitis A vaccine (1 of 2 - Risk 2-dose series) Never done    COVID-19 Vaccine (1) Never done    Pneumococcal 0-64 years Vaccine (1 of 2 - PPSV23) Never done    Depression Screen  Never done    HIV screen  Never done    Hepatitis B vaccine (1 of 3 - Risk 3-dose series) Never done    Diabetes screen  Never done    Lipid screen  Never done    Flu vaccine (1) Never done    Colorectal Cancer Screen  Never done    DTaP/Tdap/Td vaccine (2 - Td or Tdap) 06/13/2025    Hepatitis C screen  Completed    Hib vaccine  Aged Out    Meningococcal (ACWY) vaccine  Aged Out       No components found for: [facility]     PAST MEDICAL HISTORY     Past Medical History:   Diagnosis Date    Asthma     as child    Hepatitis C antibody positive in blood 08/23/2021     FAMILY HISTORY     Family History   Problem Relation Age of Onset    Diabetes Mother     High Blood Pressure Father     Atrial Fibrillation Father      SOCIAL HISTORY     Social History     Socioeconomic History    Marital status:      Spouse name: Not on file    Number of children: Not on file    Years of education: Not on file    Highest education level: Not on file   Occupational History    Not on file   Tobacco Use    Smoking status: Former Smoker     Packs/day: 0.50     Types: Cigarettes     Quit date: 2012     Years since quitting: 10.2    Smokeless tobacco: Former User     Types: [address] date: 9/6/2020   Vaping Use    Vaping Use: Every day    Substances: THC    Devices: Pre-filled or refillable cartridge   Substance and Sexual Activity    Alcohol use: Not Currently     Comment: 2-3 times a year    Drug use: Yes     Types: Marijuana Mardekendra Ren)    Sexual activity: Yes     Partners: Female   Other Topics Concern    Not on file   Social History Narrative    Not on file     Social Determinants of Health     Financial Resource Strain:     Difficulty of Paying Living Expenses: Not on file   Food Insecurity:     Worried About [address] in the Last Year: Not on file    [address] in the Last Year: Not on file   Transportation Needs:     Lack of Transportation (Medical): Not on file    Lack of Transportation (Non-Medical):  Not on file   Physical Activity:     Days of Exercise per Week: Not on file    Minutes of Exercise per Session: Not on file   Stress:     Feeling of Stress : Not on file   Social Connections:     Frequency of Communication with Friends and Family: Not on file    Frequency of Social Gatherings with Friends and Family: Not on file    Attends [employer] Services: Not on file    Active Member of Clubs or Organizations: Not on file    Attends Club or Organization Meetings: Not on file    Marital Status: Not on file   Intimate Partner Violence:     Fear of Current or Ex-Partner: Not on file   [employer] Abused: Not on file    Physically Abused: Not on file    Sexually Abused: Not on file   Housing Stability:     Unable to Pay for Housing in the Last Year: Not on file    Number of Places Lived in the Last Year: Not on file    Unstable Housing in the Last Year: Not on file     SURGICAL HISTORY     Past Surgical History:   Procedure Laterality Date    ANKLE SURGERY      HAND [id number]   (This list may include medications prescribed during this encounter as epic can not insert only the list prior to this encounter.)  Current Outpatient Rx   Medication Sig Dispense Refill    dicyclomine (BENTYL) 10 MG capsule Take 1 capsule by mouth 2 times daily 60 capsule 2    Sofosbuvir-Velpatasvir (EPCLUSA) 400-100 MG TABS Take 1 tablet by mouth daily Indications: Hepatitis due to Hepatitis C Virus, Genotype 1a Take once daily 30 tablet 3     ALLERGIES   No Known Allergies  IMMUNIZATIONS     Immunization History   Administered Date(s) Administered    Tdap (Boostrix, Adacel) 06/13/2015     REVIEW OF SYSTEMS   See HPI for further details and pertinent postiives. Negative for the following:  Constitutional: Negative for weight change. Negative for appetite change and fatigue. HENT: Negative for nosebleeds, sore throat, mouth sores, and voice change. Respiratory: Negative for cough, choking and chest tightness. Cardiovascular: Negative for chest pain   Gastrointestinal: See HPI  Musculoskeletal: Negative for arthralgias. Skin: Negative for pallor. Neurological: Negative for weakness and light-headedness. Hematological: Negative for adenopathy. Does not bruise/bleed easily. Psychiatric/Behavioral: Negative for suicidal ideas.    PHYSICAL EXAM   VITAL SIGNS: BP (!) 150/76 (Site: Left Wrist, Position: Sitting)   Pulse 78   Temp 98.1 °F (36.7 °C) (Infrared)   Ht 5' 11\" (1.803 m)   Wt 205 lb (93 kg)   BMI 28.59 kg/m²   Wt Readings from Last 3 Encounters:   03/29/22 205 lb (93 kg)   02/15/22 212 lb 3.2 oz (96.3 kg)   01/25/22 199 lb (90.3 kg)     Constitutional: Well developed, Well nourished, No acute distress, Non-toxic appearance. HENT: Normocephalic, Atraumatic, Bilateral external ears normal, Oropharynx moist, No oral exudates, Nose normal.   Eyes: Conjunctiva normal, No discharge. Neck: Normal range of motion  Cardiovascular: Normal heart rate, Normal rhythm   Thorax & Lungs: Normal breath sounds, No respiratory distress, No wheezing  Abdomen: normal bowel sounds, soft, non tender, non distended  Rectal:  Deferred. Skin: Tattoos, Warm, Dry, No erythema, No rash. No bruising. Lower Extremities: Intact distal pulses, No edema, No tenderness, No cyanosis, No clubbing. Neurologic: Alert & oriented x 3, Normal motor function, Normal sensory function, No focal deficits noted. RADIOLOGY/PROCEDURES   No results found. FINAL IMPRESSION   Jl Liu was seen today for new patient, abdominal pain and diarrhea. Diagnoses and all orders for this visit:    Left upper quadrant abdominal pain with associated diarrhea. DDx include but not limited to exocrine pancreatic insufficiency, IBD, celiac disease, peptic ulcer disease, infectious etiology vs. Cannabinoid hyperemesis syndrome. -     IgA; Future  -     Tissue Transglutaminase Antobody IgA w Reflex; Future  -     Calprotectin Stool; Future  -     IgA; Future  -     Tissue Transglutaminase Antobody IgA w Reflex; Future  -     TSH with Reflex; Future  -     Pancreatic elastase, fecal; Future  -     Calprotectin Stool; Future  -     Clostridium Difficile PCR; Future  -     Giardia / Cryptosporidum antigens; Future  -     EGD and Colonoscopy     Esophagogastroduodenoscopy (EGD) and colonoscopy with alternatives, rationale, benefits and risks, not limited to bleeding, infection, perforation, need of surgery, prolong hospital stay and anesthesia side effects were explained.  Patient verbalized understanding and agrees to proceed with EGD and colonoscopy. Orders Placed This Encounter   Procedures    IgA     Standing Status:   Future     Standing Expiration Date:   4/29/2022    Tissue Transglutaminase Antobody IgA w Reflex     Standing Status:   Future     Standing Expiration Date:   4/29/2022    TSH with Reflex     Standing Status:   Future     Standing Expiration Date:   4/29/2022    Pancreatic elastase, fecal     Standing Status:   Future     Standing Expiration Date:   4/29/2022    Calprotectin Stool     Standing Status:   Future     Standing Expiration Date:   4/29/2022    Clostridium Difficile PCR     Standing Status:   Future     Standing Expiration Date:   4/29/2022    Giardia / Cryptosporidum antigens     Standing Status:   Future     Standing Expiration Date:   4/29/2022     ORDERED FUTURE/PENDING TESTS     Lab Frequency Next Occurrence       FOLLOWUP   Return in about 3 months (around 6/29/2022).           Lucia Beavers MD 3/28/22 10:26 AM EDT    CC:  Laila Olivas MD

## 2022-03-29 ENCOUNTER — INITIAL CONSULT (OUTPATIENT)
Dept: GASTROENTEROLOGY | Age: 47
End: 2022-03-29
Payer: COMMERCIAL

## 2022-03-29 ENCOUNTER — HOSPITAL ENCOUNTER (OUTPATIENT)
Age: 47
Discharge: HOME | End: 2022-03-29

## 2022-03-29 VITALS
BODY MASS INDEX: 28.7 KG/M2 | WEIGHT: 205 LBS | TEMPERATURE: 98.1 F | HEIGHT: 71 IN | DIASTOLIC BLOOD PRESSURE: 76 MMHG | SYSTOLIC BLOOD PRESSURE: 150 MMHG | HEART RATE: 78 BPM

## 2022-03-29 DIAGNOSIS — R10.12 LEFT UPPER QUADRANT ABDOMINAL PAIN: Primary | ICD-10-CM

## 2022-03-29 DIAGNOSIS — R19.7 DIARRHEA, UNSPECIFIED TYPE: ICD-10-CM

## 2022-03-29 DIAGNOSIS — R10.12 LEFT UPPER QUADRANT ABDOMINAL PAIN: ICD-10-CM

## 2022-03-29 LAB
IGA: 368 MG/DL (ref 70–400)
TSH REFLEX: 1.25 UIU/ML (ref 0.27–4.2)

## 2022-03-29 PROCEDURE — 99244 OFF/OP CNSLTJ NEW/EST MOD 40: CPT | Performed by: INTERNAL MEDICINE

## 2022-03-29 PROCEDURE — 82784 ASSAY IGA/IGD/IGG/IGM EACH: CPT

## 2022-03-29 PROCEDURE — 36415 COLL VENOUS BLD VENIPUNCTURE: CPT

## 2022-03-29 PROCEDURE — 84443 ASSAY THYROID STIM HORMONE: CPT

## 2022-03-29 NOTE — PATIENT INSTRUCTIONS
Patient Education        Upper GI Endoscopy: Before Your Procedure  What is an upper GI endoscopy? An upper gastrointestinal (or GI) endoscopy is a test that allows your doctor to look at the inside of your esophagus, stomach, and the first part of your small intestine, called the duodenum. The esophagus is the tube that carries food to your stomach. The doctor uses a thin, lighted tube that bends. It iscalled an endoscope, or scope. The doctor puts the tip of the scope in your mouth and gently moves it down your throat. The scope is a flexible video camera. The doctor looks at a monitor (like a TV set or a computer screen) as he or she moves the scope. A doctor may do this procedure to look for ulcers, tumors, infection, or bleeding. It also can be used to look for signs of acid backing up into your esophagus. This is called gastroesophageal reflux disease, or GERD. The doctor can use the scope to take a sample of tissue for study (a biopsy). The doctoralso can use the scope to take out growths or stop bleeding. Follow-up care is a key part of your treatment and safety. Be sure to make and go to all appointments, and call your doctor if you are having problems. It's also a good idea to know your test results and keep alist of the medicines you take. How do you prepare for the procedure? Procedures can be stressful. This information will help you understand what youcan expect. And it will help you safely prepare for your procedure. Preparing for the procedure     Do not eat or drink anything for 6 to 8 hours before the test. An empty stomach helps your doctor see your stomach clearly during the test. It also reduces your chances of vomiting. If you vomit, there is a small risk that the vomit could enter your lungs.  (This is called aspiration.) If the test is done in an emergency, a tube may be inserted through your nose or mouth to empty your stomach.      Do not take sucralfate (Carafate) or antacids on the day of the test. These medicines can make it hard for your doctor to see your upper GI tract.      If your doctor tells you to, stop taking iron supplements 7 to 14 days before the test.      Be sure you have someone to take you home. Anesthesia and pain medicine will make it unsafe for you to drive or get home on your own.      Understand exactly what procedure is planned, along with the risks, benefits, and other options.  Tell your doctor ALL the medicines, vitamins, supplements, and herbal remedies you take. Some may increase the risk of problems during your procedure. Your doctor will tell you if you should stop taking any of them before the procedure and how soon to do it.      If you take aspirin or some other blood thinner, ask your doctor if you should stop taking it before your procedure. Make sure that you understand exactly what your doctor wants you to do. These medicines increase the risk of bleeding.      Make sure your doctor and the hospital have a copy of your advance directive. If you don't have one, you may want to prepare one. It lets others know your health care wishes. It's a good thing to have before any type of surgery or procedure. What happens on the day of the procedure?  Follow the instructions exactly about when to stop eating and drinking. If you don't, your procedure may be canceled. If your doctor told you to take your medicines on the day of the procedure, take them with only a sip of water.      Take a bath or shower before you come in for your procedure. Do not apply lotions, perfumes, deodorants, or nail polish.      Take off all jewelry and piercings. And take out contact lenses, if you wear them. At the hospital or surgery center    Bring a picture ID.      The test may take 15 to 30 minutes.      The doctor may spray medicine on the back of your throat to numb it.  You also will get medicine to prevent pain and to relax you.      You will lie on your [id number] [employer]. Care instructions adapted under license by [facility] ([facility]). If you have questions about a medical condition or this instruction, always ask your healthcare professional. [address] any warranty or liability for your use of this information. Patient Education        Colonoscopy: Before Your Procedure  What is a colonoscopy? A colonoscopy is a test that lets a doctor look inside your colon. The doctor uses a thin, lighted tube called a colonoscope to look for problems. Theseinclude small growths called polyps, cancer, or bleeding. During the test, the doctor can take samples of tissue that can be checked for cancer or other problems. This is called a biopsy. The doctor can also take outpolyps. Before the test, you will need to stop eating solid foods. You also will be given instructions on how to clean out your colon. This helps your doctor beable to see inside your colon during the test.  How do you prepare for the procedure? Procedures can be stressful. This information will help you understand what youcan expect. And it will help you safely prepare for your procedure. Preparing for the procedure     Be sure you have someone to take you home. Anesthesia and pain medicine will make it unsafe for you to drive or get home on your own.  Understand exactly what procedure is planned, along with the risks, benefits, and other options.      Tell your doctor ALL the medicines, vitamins, supplements, and herbal remedies you take. Some may increase the risk of problems during your procedure. Your doctor will tell you if you should stop taking any of them before the procedure and how soon to do it.      If you take aspirin or some other blood thinner, ask your doctor if you should stop taking it before your procedure. Make sure that you understand exactly what your doctor wants you to do.  These medicines increase the risk of bleeding.      Make sure your doctor and the hospital have a copy of your advance directive. If you don't have one, you may want to prepare one. It lets others know your health care wishes. It's a good thing to have before any type of surgery or procedure. Before the procedure     Follow your doctor's directions about when to stop eating solid foods and drink only clear liquids. You can drink water, clear juices, clear broths, flavored ice pops, and gelatin (such as Jell-O). Do not eat or drink anything red or purple. This includes grape juice and grape-flavored ice pops. It also includes fruit punch and cherry gelatin.      Drink the \"colon prep\" liquid as your doctor tells you. You will want to stay home, because the liquid will make you go to the bathroom a lot. Your stools will be loose and watery. It's very important to drink all of the liquid. If you have problems drinking it, call your doctor.      Do not eat any solid foods after you drink the colon prep.      Stop drinking clear liquids for a few hours before the test. Your doctor will tell you how many hours this will be. What happens on the day of the procedure?  Follow the instructions exactly about when to stop eating and drinking. If you don't, your procedure may be canceled. If your doctor told you to take your medicines on the day of the procedure, take them with only a sip of water.      Take a bath or shower before you come in for your procedure. Do not apply lotions, perfumes, deodorants, or nail polish.      Take off all jewelry and piercings. And take out contact lenses, if you wear them. At the doctor's office or hospital    Bring a picture ID.      You will be kept comfortable and safe by your anesthesia provider. The anesthesia may make you sleep.      You will lie on your back or your side with your knees drawn up toward your belly. The doctor will gently put a gloved finger into your anus.  Then the doctor puts the scope in and moves it into your colon. The scope goes in easily because it is lubricated.      The doctor may also use small tools to take tissue samples for a biopsy or to remove polyps. This does not hurt.      The test usually takes 30 to 45 minutes. But it may take longer. It depends on what is found and what is done. When should you call your doctor?  You have questions or concerns.      You don't understand how to prepare for your procedure.      You are having trouble with the bowel prep.      You become ill before the procedure (such as fever, flu, or a cold).      You need to reschedule or have changed your mind about having the procedure. Where can you learn more? Go to [email or web address].[email or web address] org and sign in to your [employer] account. Enter C315 in the MyCare box to learn more about \"Colonoscopy: Before Your Procedure. \"     If you do not have an account, please click on the \"Sign Up Now\" link. Current as of: September 8, 2021               Content Version: 13.2  © 2006-2022 [employer]wise[employer] Inc[employer]. Care instructions adapted under license by [facility] ([facility]). If you have questions about a medical condition or this instruction, always ask your healthcare professional. [place] [address] any warranty or liability for your use of this information.

## 2022-03-31 ENCOUNTER — HOSPITAL ENCOUNTER (OUTPATIENT)
Age: 47
Discharge: HOME | End: 2022-03-31

## 2022-03-31 DIAGNOSIS — R19.7 DIARRHEA, UNSPECIFIED TYPE: ICD-10-CM

## 2022-03-31 DIAGNOSIS — R10.12 LEFT UPPER QUADRANT ABDOMINAL PAIN: ICD-10-CM

## 2022-03-31 PROCEDURE — 83520 IMMUNOASSAY QUANT NOS NONAB: CPT

## 2022-03-31 PROCEDURE — 83993 ASSAY FOR CALPROTECTIN FECAL: CPT

## 2022-04-05 LAB — CALPROTECTIN, FECAL: <5 UG/G

## 2022-04-07 DIAGNOSIS — R19.7 DIARRHEA, UNSPECIFIED TYPE: Primary | ICD-10-CM

## 2022-04-07 LAB — PANCREATIC ELASTASE, FECAL: >800 UG/G

## 2022-04-12 ENCOUNTER — OFFICE VISIT (OUTPATIENT)
Dept: INTERNAL MEDICINE CLINIC | Age: 47
End: 2022-04-12

## 2022-04-12 VITALS
HEART RATE: 85 BPM | OXYGEN SATURATION: 98 % | DIASTOLIC BLOOD PRESSURE: 80 MMHG | HEIGHT: 71 IN | TEMPERATURE: 96 F | BODY MASS INDEX: 28.84 KG/M2 | SYSTOLIC BLOOD PRESSURE: 110 MMHG | WEIGHT: 206 LBS

## 2022-04-12 DIAGNOSIS — K58.0 IRRITABLE BOWEL SYNDROME WITH DIARRHEA: ICD-10-CM

## 2022-04-12 DIAGNOSIS — B18.2 CHRONIC HEPATITIS C WITHOUT HEPATIC COMA (HCC): Primary | ICD-10-CM

## 2022-04-12 PROCEDURE — 99214 OFFICE O/P EST MOD 30 MIN: CPT | Performed by: INTERNAL MEDICINE

## 2022-04-12 NOTE — PATIENT INSTRUCTIONS
1. Hepatitis C, genotype 1a/1b with quantitative RNA 3.94 UI/mL.    Continue taking Sofosbuvir-Velpatasvir (Epclusa) 400-100 MG tablet ONCE per day to complete 12 weeks (three months).    2. Irritable bowel syndrome with left lower abdominal spasms:  Start back taking dicyclomine (Bentyl) 10 mg capsule 2-4 times per day and BEFORE a meal      Follow-up in six weeks AFTER endoscopy, colonoscopy, and GI labs

## 2022-04-12 NOTE — PROGRESS NOTES
SUBJECTIVE:   Follow up from 2/15 for hepatitis C on Epclusa (Sofosbuvir-Velpatasvir). He has 8 days remaining out of 12 week course. Abdominal discomfort left sided with known history of irritable bowel with diarrhea. He was prescribed dicyclomine which helped initially and then stopped. He has been off Bentyl for the past two weeks. Left upper quadrant abdominal pain and seen at emergency department (1/19) with negative CT scan. He has EGD and colonoscopy scheduled for 4/21 with Dr Ann Lakhani.      History of present illness:  History of hepatitis C possibly treated with interferon in 2008, he apparently had jaundice at that time. Right shoulder pain for the past year. Darrold Saldana capsulitis right shoulder for about 18 months two years ago. Ella Ricketts for surgery despite gradual increased range of motion over the past two months.  Affects sleep due to spasms.  History of palpitations 3/6 seen in the ED with negative workup.   Elevated liver enzymes by labs drawn 3/6.  Nonsmoker.   Some left flank pain.  Nausea in the mornings.  Right wrist fracture requiring pins (2007).  Right ankle surgery (1992).    Seen in the ED (3/6/21) for palpitations which resolved after he stopped drinking [facility].  Notes frequent and soft stool.        MEDICATIONS:  dicyclomine (BENTYL) 10 MG capsule Take 1 capsule by mouth 2 times daily   Sofosbuvir-Velpatasvir (EPCLUSA) 400-100 MG TABS Take 1 tablet by mouth daily Indications: Hepatitis due to Hepatitis C Virus, Genotype 1a Take once daily         Lab Results   Component Value Date    WBC 7.2 03/02/2022    HGB 16.3 03/02/2022     03/02/2022    MCV 89.7 03/02/2022     Lab Results   Component Value Date     03/02/2022    K 4.6 03/02/2022     03/02/2022    CO2 26 03/02/2022    BUN 12 03/02/2022    CREATININE 0.7 (L) 03/02/2022    GLUCOSE 150 (H) 03/02/2022     Calcium 10.0    Total Protein 7.9    Albumin 4.7    Total Bilirubin 0.7    Alkaline Phosphatase 46    ALT 75 High     AST 60 High          LABS: 08/27/2021  Hepatitis C RNA 3.94 log IU/mL  Hepatitis C genotype 1a/1b  AFP (10/28/21) 3.7 ug/L  INR (1/19) 1.14     Hep A IgM Non-reactive    Hep B Core Ab, IgM Non-reactive    Hep B S Ag Interp Non-reactive    Hep C Ab Interp REACTIVE       CT abd / pelvis (1/19) No acute findings within the abdomen or pelvis.  No evidence of appendicitis. Mild hepatic steatosis. Stable mild splenomegaly. OBJECTIVE:    /80   Pulse 85   Temp 96 °F (35.6 °C)   Ht 5' 11\" (1.803 m)   Wt 206 lb (93.4 kg)   SpO2 98%   BMI 28.73 kg/m²   HEENT:  Oropharynx clear, no lymphadenopathy,   LUNGS:  Clear and without wheezes  HEART:  Regular rhythm, no appreciable murmur  ABD:  Benign, active bowel sounds  EXT:  No edema, pulses palpable  NEURO:  No focal neurologic deficits noted. ASSESSMENT / PLAN:   1. Hepatitis C, genotype 1a/1b with quantitative RNA 3.94 UI/mL.    Continue taking Sofosbuvir-Velpatasvir (Epclusa) 400-100 MG tablet ONCE per day to complete 12 weeks (three months).    2. Irritable bowel syndrome with left lower abdominal spasms:  Start back taking dicyclomine (Bentyl) 10 mg capsule 2-4 times per day and BEFORE a meal      Follow-up in six weeks AFTER endoscopy, colonoscopy, and GI labs

## 2022-04-20 ENCOUNTER — HOSPITAL ENCOUNTER (EMERGENCY)
Age: 47
Discharge: HOME | End: 2022-04-20
Attending: EMERGENCY MEDICINE

## 2022-04-20 ENCOUNTER — APPOINTMENT (OUTPATIENT)
Dept: GENERAL RADIOLOGY | Age: 47
End: 2022-04-20

## 2022-04-20 VITALS
WEIGHT: 205 LBS | HEART RATE: 70 BPM | HEIGHT: 70 IN | BODY MASS INDEX: 29.35 KG/M2 | SYSTOLIC BLOOD PRESSURE: 120 MMHG | DIASTOLIC BLOOD PRESSURE: 70 MMHG | OXYGEN SATURATION: 97 % | TEMPERATURE: 97.8 F | RESPIRATION RATE: 16 BRPM

## 2022-04-20 DIAGNOSIS — S93.401A SPRAIN OF RIGHT ANKLE, UNSPECIFIED LIGAMENT, INITIAL ENCOUNTER: Primary | ICD-10-CM

## 2022-04-20 PROCEDURE — 73590 X-RAY EXAM OF LOWER LEG: CPT

## 2022-04-20 PROCEDURE — 73610 X-RAY EXAM OF ANKLE: CPT

## 2022-04-20 PROCEDURE — 99283 EMERGENCY DEPT VISIT LOW MDM: CPT

## 2022-04-20 ASSESSMENT — PAIN - FUNCTIONAL ASSESSMENT: PAIN_FUNCTIONAL_ASSESSMENT: 0-10

## 2022-04-20 ASSESSMENT — PAIN DESCRIPTION - ORIENTATION: ORIENTATION: RIGHT

## 2022-04-20 ASSESSMENT — PAIN SCALES - GENERAL: PAINLEVEL_OUTOF10: 8

## 2022-04-20 ASSESSMENT — PAIN DESCRIPTION - LOCATION: LOCATION: ANKLE

## 2022-04-20 NOTE — ED PROVIDER NOTES
[address] ED      CHIEF COMPLAINT  Ankle Pain (Pt states right ankle pain. Pt states that he fell down some stairs last night and thought that he just sprained his ankle. Pt states increased pain and swelling this am. Pt states that he took some Ibuprofen this am at home. )       2021 Chris Burns is a 40 y.o. male  who presents to the ED complaining of right-sided ankle pain. Patient states that he was going up some stairs and slipped and kind of slid down several stairs on his feet. He states that it is a rather quick almost jarring injury to the right ankle. Pain radiates up proximally into the leg anteriorly laterally. He is concerned as he has previous injury to that same ankle with prior surgery. He does not currently have an established orthopedist.  He denies any focal numbness tingling or weakness. When he was walking on it though the pain was so severe that it made him nauseous. Is still quite swollen and painful this morning so he took some ibuprofen but felt that he should be evaluated as he is concerned for possible fracture. Patient denies any his head or any loss of consciousness. No neck or back pain. No other complaints, modifying factors or associated symptoms. I have reviewed the following from the nursing documentation.     Past Medical History:   Diagnosis Date    Asthma     as child    Hepatitis C antibody positive in blood 08/23/2021     Past Surgical History:   Procedure Laterality Date    ANKLE SURGERY      HAND SURGERY       Family History   Problem Relation Age of Onset    Diabetes Mother     High Blood Pressure Father     Atrial Fibrillation Father      Social History     Socioeconomic History    Marital status:      Spouse name: Not on file    Number of children: Not on file    Years of education: Not on file    Highest education level: Not on file   Occupational History    Not on file   Tobacco Use    Smoking status: Former Smoker     Packs/day: 0.50     Types: Cigarettes     Quit date: 2012     Years since quitting: 10.3    Smokeless tobacco: Former User     Types: Chew     Quit date: 9/6/2020   Vaping Use    Vaping Use: Every day    Substances: THC    Devices: Pre-filled or refillable cartridge   Substance and Sexual Activity    Alcohol use: Not Currently     Comment: 2-3 times a year    Drug use: Yes     Types: Marijuana Kristyn Prescott)    Sexual activity: Yes     Partners: Female   Other Topics Concern    Not on file   Social History Narrative    Not on file     Social Determinants of Health     Financial Resource Strain:     Difficulty of Paying Living Expenses: Not on file   Food Insecurity:     Worried About Running Out of Food in the Last Year: Not on file    Abdullahi of Food in the Last Year: Not on file   Transportation Needs:     Lack of Transportation (Medical): Not on file    Lack of Transportation (Non-Medical): Not on file   Physical Activity:     Days of Exercise per Week: Not on file    Minutes of Exercise per Session: Not on file   Stress:     Feeling of Stress : Not on file   Social Connections:     Frequency of Communication with Friends and Family: Not on file    Frequency of Social Gatherings with Friends and Family: Not on file    Attends Holiness Services: Not on file    Active Member of [address] [place] [address] [email or web address] or Organizations: Not on file    Attends Club or Organization Meetings: Not on file    Marital Status: Not on file   Intimate Partner Violence:     Fear of Current or Ex-Partner: Not on file    Emotionally Abused: Not on file    Physically Abused: Not on file    Sexually Abused: Not on file   Housing Stability:     Unable to Pay for Housing in the Last Year: Not on file    Number of [place] in the Last Year: Not on file    Unstable Housing in the Last Year: Not on file     No current facility-administered medications for this encounter.      Current Outpatient Medications Medication Sig Dispense Refill    Sofosbuvir-Velpatasvir (EPCLUSA) 400-100 MG TABS Take 1 tablet by mouth daily Indications: Hepatitis due to Hepatitis C Virus, Genotype 1a Take once daily 30 tablet 3     No Known Allergies    REVIEW OF SYSTEMS  10 systems reviewed, pertinent positives per HPI otherwise noted to be negative. PHYSICAL EXAM  /84   Pulse 68   Temp 97.8 °F (36.6 °C) (Temporal)   Resp 18   Ht 5' 10\" (1.778 m)   Wt 205 lb (93 kg)   SpO2 97%   BMI 29.41 kg/m²    GENERAL APPEARANCE: Awake and alert. Cooperative. No acute distress. HENT: Normocephalic. Atraumatic. Mucous membranes are moist.  No drooling or stridor. NECK: Supple. EYES: PERRL. EOM's grossly intact. HEART/CHEST: RRR. No murmurs. 2+ DP and PT pulses on the right. LUNGS: Respirations unlabored. CTAB. Good air exchange. Speaking comfortably in full sentences. ABDOMEN: No tenderness. Soft. Non-distended. No masses. No organomegaly. No guarding or rebound. MUSCULOSKELETAL: No extremity edema. Compartments soft. No deformity. Patient with mild soft tissue swelling especially the lateral aspect of the right ankle with tenderness noted diffusely to the lateral medial aspect of the ankle on the right. Patient without any tenderness distally into the mid or distal foot. Negative Shaw sign. Mild tenderness diffusely to the anterior proximal tibia without any focal bony point tenderness or any deformities noted there. No focal tenderness within the knee joint itself. All extremities neurovascularly intact. SKIN: Warm and dry. No acute rashes. NEUROLOGICAL: Alert and oriented. CN's 2-12 intact. No gross facial drooping. Strength 5/5, sensation intact. Specifically, motor and sensory intact distally in the right foot. PSYCHIATRIC: Normal mood and affect. LABS  I have reviewed all labs for this visit. No results found for this visit on 04/20/22.     RADIOLOGY  XR TIBIA FIBULA RIGHT (2 VIEWS)    Result Date: 4/20/2022  EXAMINATION: 3 XRAY VIEWS OF THE RIGHT TIBIA AND FIBULA 4/20/2022 9:36 am COMPARISON: None. HISTORY: ORDERING SYSTEM PROVIDED HISTORY: pain proximal tibia after sliding down stairs yesterday TECHNOLOGIST PROVIDED HISTORY: Reason for exam:->pain proximal tibia after sliding down stairs yesterday Reason for Exam: (Pt states right ankle pain. Pt states that he fell down some stairs last night and thought that he just sprained his ankle. Pt states increased pain and swelling this am. Pt states that he took some Ibuprofen this am at home. ) FINDINGS: The alignment of the tibia and fibula is normal.  There are corticated calcifications adjacent to the medial and lateral malleoli, as well as the medial and lateral talus. Soft tissue swelling is noted laterally. No acute fracture is appreciated. Multiple old avulsion fractures are noted of the medial and lateral malleoli. Lateral soft tissue swelling. XR ANKLE RIGHT (MIN 3 VIEWS)    Result Date: 4/20/2022  EXAMINATION: THREE XRAY VIEWS OF THE RIGHT ANKLE 4/20/2022 9:36 am COMPARISON: None. HISTORY: ORDERING SYSTEM PROVIDED HISTORY: slid down stairs, pain medial/lateral mal TECHNOLOGIST PROVIDED HISTORY: Reason for exam:->slid down stairs, pain medial/lateral mal Reason for Exam: (Pt states right ankle pain. Pt states that he fell down some stairs last night and thought that he just sprained his ankle. Pt states increased pain and swelling this am. Pt states that he took some Ibuprofen this am at home. ) FINDINGS: There are calcifications/ossifications noted inferior to the medial malleolus, lateral to the lateral malleolus. These are probably related to prior trauma. No definite acute bony injury is noted. There is soft tissue swelling noted along the lateral aspect of the ankle. The ankle mortise is intact. The talus and navicular appear unremarkable. There is an osteophyte of the anterior lip of the tibia at the ankle. Calcifications/ossifications inferior to the medial malleolus and lateral to the lateral malleolus. These probably related to prior trauma but clinical correlation required. Soft tissue swelling along the lateral malleolus. No definite fractures identified. ED COURSE/MDM  Patient seen and evaluated. Old records reviewed. imaging reviewed and results discussed with patient. Patient presenting for evaluation of right ankle/leg pain after injury yesterday. He has soft tissue swelling especially to the lateral aspect of the right ankle but no definite fracture seen on x-rays. No evidence of dislocation. He is neurovascular intact. Will treat conservatively with an Ortho boot and referral to orthopedics. Patient felt comfortable with plan. We discussed rest, ice elevation and continued NSAID use as needed for pain control. Reasons to return to the ER were discussed and all questions answered at time of discharge. I estimate there is LOW risk for COMPARTMENT SYNDROME, DEEP VENOUS THROMBOSIS, SEPTIC ARTHRITIS, TENDON OR NEUROVASCULAR INJURY, thus I consider the discharge disposition reasonable. Herrera Goodwin and I have discussed the diagnosis and risks, and we agree with discharging home to follow-up with their primary doctor or the referral orthopedist. We also discussed returning to the Emergency Department immediately if new or worsening symptoms occur. We have discussed the symptoms which are most concerning (e.g., changing or worsening pain, numbness, weakness) that necessitate immediate return. During the patient's ED course, the patient was given:  Medications - No data to display     CLINICAL IMPRESSION  1. Sprain of right ankle, unspecified ligament, initial encounter        Blood pressure 124/84, pulse 68, temperature 97.8 °F (36.6 °C), temperature source Temporal, resp. rate 18, height 5' 10\" (1.778 m), weight 205 lb (93 kg), SpO2 97 %.     DISPOSITION  Herrera Goodwin was discharged to home in stable condition. Patient was given scripts for the following medications. I counseled patient how to take these medications. New Prescriptions    No medications on file       Follow-up with:  Lizbeth Dai MD  [place] [address]  [address] [id number] [phone]      As needed    Traci Canales MD  [address] [place]  [phone]    Schedule an appointment as soon as possible for a visit   to follow up with orthopedics      DISCLAIMER: This chart was created using Dragon dictation software. Efforts were made by me to ensure accuracy, however some errors may be present due to limitations of this technology and occasionally words are not transcribed correctly.         Daren Ghotra MD  04/20/22 [id number]

## 2022-06-06 ENCOUNTER — TELEPHONE (OUTPATIENT)
Dept: INTERNAL MEDICINE CLINIC | Age: 47
End: 2022-06-06

## 2022-06-06 RX ORDER — PANTOPRAZOLE SODIUM 40 MG/1
40 TABLET, DELAYED RELEASE ORAL
Qty: 30 TABLET | Refills: 5 | Status: SHIPPED | OUTPATIENT
Start: 2022-06-06 | End: 2022-06-21

## 2022-06-06 NOTE — TELEPHONE ENCOUNTER
JAYMIE from patient asking for a refill on a medication that he took in the past for ulcers. He used to take Omeprazole and Prilosec. Asking for an RX? Also took an antibiotic to help heal this.      He does have follow up scheduled 06/14/2022

## 2022-06-08 ENCOUNTER — OFFICE VISIT (OUTPATIENT)
Dept: INTERNAL MEDICINE CLINIC | Age: 47
End: 2022-06-08

## 2022-06-08 VITALS
DIASTOLIC BLOOD PRESSURE: 80 MMHG | SYSTOLIC BLOOD PRESSURE: 122 MMHG | WEIGHT: 194 LBS | HEIGHT: 70 IN | OXYGEN SATURATION: 97 % | BODY MASS INDEX: 27.77 KG/M2 | HEART RATE: 84 BPM

## 2022-06-08 DIAGNOSIS — R10.13 EPIGASTRIC PAIN: Primary | ICD-10-CM

## 2022-06-08 PROCEDURE — 99213 OFFICE O/P EST LOW 20 MIN: CPT | Performed by: INTERNAL MEDICINE

## 2022-06-08 RX ORDER — OMEPRAZOLE 40 MG/1
40 CAPSULE, DELAYED RELEASE ORAL
Qty: 30 CAPSULE | Refills: 5 | Status: SHIPPED | OUTPATIENT
Start: 2022-06-08

## 2022-06-08 ASSESSMENT — PATIENT HEALTH QUESTIONNAIRE - PHQ9
SUM OF ALL RESPONSES TO PHQ QUESTIONS 1-9: 0
2. FEELING DOWN, DEPRESSED OR HOPELESS: 0
SUM OF ALL RESPONSES TO PHQ QUESTIONS 1-9: 0
SUM OF ALL RESPONSES TO PHQ9 QUESTIONS 1 & 2: 0
SUM OF ALL RESPONSES TO PHQ QUESTIONS 1-9: 0
1. LITTLE INTEREST OR PLEASURE IN DOING THINGS: 0
SUM OF ALL RESPONSES TO PHQ QUESTIONS 1-9: 0

## 2022-06-08 ASSESSMENT — ENCOUNTER SYMPTOMS
SHORTNESS OF BREATH: 0
COUGH: 0

## 2022-06-08 NOTE — PROGRESS NOTES
Russ Davis (:  1975) is a 40 y.o. male,, here for evaluation of the following chief complaint(s):  Discuss Medications (thinks he is having some side effects from the protonix. sweats, hands shaking ) and Other (went to [facility] on , told he has a stomach ulcer and possible diverticulosis )         ASSESSMENT/PLAN:  1. Epigastric pain  1. Patient has epigastric pain and previously has responded well to omeprazole. I instructed him to stop taking the Protonix and change back to omeprazole. I also encouraged him to follow through with the GI physician and proceed with EGD that was previously canceled. He will call if he has other symptoms. Return if symptoms worsen or fail to improve. Subjective   SUBJECTIVE/OBJECTIVE:  HPI patient in due to concerns over side effects after taking Protonix. he states that after taking the Protonix he had sweats shakes and headaches. Previously had been on omeprazole without the side effects and controlled his symptoms well. He denies any other health concerns at this time    Review of Systems   Respiratory: Negative for cough and shortness of breath. Cardiovascular: Negative for chest pain and palpitations. All other systems reviewed and are negative. Objective   Physical Exam  Constitutional:       Appearance: Normal appearance. HENT:      Right Ear: Tympanic membrane normal.      Left Ear: Tympanic membrane normal.   Cardiovascular:      Rate and Rhythm: Normal rate and regular rhythm. Pulses: Normal pulses. Heart sounds: Normal heart sounds. Pulmonary:      Effort: Pulmonary effort is normal.      Breath sounds: Normal breath sounds. Abdominal:      General: Abdomen is flat. Bowel sounds are normal.      Palpations: Abdomen is soft. Tenderness: There is abdominal tenderness.       Comments: Mildly tender to palpation in epigastric and left upper quadrant without palpable abnormality Musculoskeletal:      Cervical back: Normal range of motion and neck supple. No tenderness. Lymphadenopathy:      Cervical: No cervical adenopathy. Neurological:      Mental Status: He is alert. An electronic signature was used to authenticate this note.     --Leeann Cowart MD

## 2022-06-21 ENCOUNTER — OFFICE VISIT (OUTPATIENT)
Dept: INTERNAL MEDICINE CLINIC | Age: 47
End: 2022-06-21

## 2022-06-21 ENCOUNTER — HOSPITAL ENCOUNTER (OUTPATIENT)
Age: 47
Discharge: HOME | End: 2022-06-21
Payer: COMMERCIAL

## 2022-06-21 VITALS
SYSTOLIC BLOOD PRESSURE: 153 MMHG | OXYGEN SATURATION: 97 % | HEART RATE: 89 BPM | BODY MASS INDEX: 28.77 KG/M2 | HEIGHT: 70 IN | TEMPERATURE: 97.1 F | DIASTOLIC BLOOD PRESSURE: 102 MMHG | WEIGHT: 201 LBS

## 2022-06-21 DIAGNOSIS — B18.2 CHRONIC HEPATITIS C WITHOUT HEPATIC COMA (HCC): ICD-10-CM

## 2022-06-21 DIAGNOSIS — K21.9 GASTRIC REFLUX: ICD-10-CM

## 2022-06-21 DIAGNOSIS — R73.09 HIGH GLUCOSE: Primary | ICD-10-CM

## 2022-06-21 LAB
A/G RATIO: 1.3 (ref 1.1–2.2)
ALBUMIN SERPL-MCNC: 4.5 G/DL (ref 3.4–5)
ALP BLD-CCNC: 45 U/L (ref 40–129)
ALT SERPL-CCNC: 46 U/L (ref 10–40)
ANION GAP SERPL CALCULATED.3IONS-SCNC: 12 MMOL/L (ref 3–16)
AST SERPL-CCNC: 44 U/L (ref 15–37)
BILIRUB SERPL-MCNC: 1 MG/DL (ref 0–1)
BUN BLDV-MCNC: 14 MG/DL (ref 7–20)
CALCIUM SERPL-MCNC: 9.6 MG/DL (ref 8.3–10.6)
CHLORIDE BLD-SCNC: 102 MMOL/L (ref 99–110)
CHP ED QC CHECK: ABNORMAL
CO2: 24 MMOL/L (ref 21–32)
CREAT SERPL-MCNC: 0.8 MG/DL (ref 0.9–1.3)
GFR AFRICAN AMERICAN: >60
GFR NON-AFRICAN AMERICAN: >60
GLUCOSE BLD-MCNC: 110 MG/DL
GLUCOSE BLD-MCNC: 122 MG/DL (ref 70–99)
POTASSIUM SERPL-SCNC: 3.9 MMOL/L (ref 3.5–5.1)
SODIUM BLD-SCNC: 138 MMOL/L (ref 136–145)
TOTAL PROTEIN: 8 G/DL (ref 6.4–8.2)

## 2022-06-21 PROCEDURE — 82962 GLUCOSE BLOOD TEST: CPT | Performed by: INTERNAL MEDICINE

## 2022-06-21 PROCEDURE — 87522 HEPATITIS C REVRS TRNSCRPJ: CPT

## 2022-06-21 PROCEDURE — 36415 COLL VENOUS BLD VENIPUNCTURE: CPT

## 2022-06-21 PROCEDURE — 99214 OFFICE O/P EST MOD 30 MIN: CPT | Performed by: INTERNAL MEDICINE

## 2022-06-21 PROCEDURE — 80053 COMPREHEN METABOLIC PANEL: CPT

## 2022-06-21 NOTE — PATIENT INSTRUCTIONS
1. Hepatitis C, genotype 1a/1b with quantitative RNA 3.94 UI/mL. We will check blood tests after completing 12 week course of Sofosbuvir-Velpatasvir (Epclusa) on April 20. Check RNA level (viral load) and liver enzymes. 2. Gastric reflux:  Continue taking omeprazole (generic Prilosec) 20 mg daily.   You can start back taking dicyclomine (Bentyl) 10 mg capsule 2-4 times per day and BEFORE a meal     Follow-up as needed  LABS: hepatitis C RNA, CMP

## 2022-06-21 NOTE — PROGRESS NOTES
SUBJECTIVE:  Follow up from 6/8 for gastric reflux with anxiety related to pantoprazole, changed back to emeprazole which is working better. He reports fatigue. History of hepatitis C, completed 12 week course of  Epclusa (Sofosbuvir-Velpatasvir) 4/20. He has EGD and colonoscopy scheduled for 6/30 with Dr Brigid Young.      History of present illness:  History of hepatitis C possibly treated with interferon in 2008, he apparently had jaundice at that time. Right shoulder pain for the past year. Tom Mutter capsulitis right shoulder for about 18 months two years ago. Lucie Failing for surgery despite gradual increased range of motion over the past two months.  Affects sleep due to spasms.  History of palpitations 3/6 seen in the ED with negative workup.   Elevated liver enzymes by labs drawn 3/6.  Nonsmoker.   Some left flank pain.  Nausea in the mornings.  Right wrist fracture requiring pins (2007).  Right ankle surgery (1992).   Seen in the ED (3/6/21) for palpitations which resolved after he stopped drinking [facility].  Notes frequent and soft stool.        MEDICATIONS:  omeprazole (PRILOSEC) 40 MG  capsule Take 1 capsule by mouth every morning        POC glucose (6/21) 110 mg/dL    LABS: 06/21/2022     K 3.9      CO2 24   BUN 14   CREATININE 0.8 (L)   GLUCOSE 122 (H)     Calcium 9.6    Total Protein 8.0    Albumin 4.5    Total Bilirubin 1.0    Alkaline Phosphatase 45    ALT 46 (H)    AST 44 (H)       LABS: 08/27/2021  Hepatitis C RNA (8/27/21) 3.94 log IU/mL, (6/21/22) not detected  Hepatitis C genotype 1a/1b  AFP (10/28/21) 3.7 ug/L  INR (1/19) 1.14     CT abd / pelvis (1/19) No acute findings within the abdomen or pelvis.  No evidence of appendicitis. Mild hepatic steatosis. Stable mild splenomegaly.       OBJECTIVE:    BP (!) 153/102   Pulse 89   Temp 97.1 °F (36.2 °C) (Temporal)   Ht 5' 10\" (1.778 m)   Wt 201 lb (91.2 kg)   SpO2 97%   BMI 28.84 kg/m²   HEENT:  Oropharynx clear, no lymphadenopathy, LUNGS:  Clear and without wheezes  HEART:  Regular rhythm, no appreciable murmur  ABD:  Benign, active bowel sounds  EXT:  No edema, pulses palpable  NEURO:  No focal neurologic deficits noted. ASSESSMENT / PLAN:   1. Hepatitis C, genotype 1a/1b with quantitative RNA 3.94 UI/mL. We will check blood tests after completing 12 week course of Sofosbuvir-Velpatasvir (Epclusa) on April 20. RNA level (viral load) not detected and liver enzymes normal  2. Gastric reflux:  Continue taking omeprazole (generic Prilosec) 20 mg daily.   You can start back taking dicyclomine (Bentyl) 10 mg capsule 2-4 times per day and BEFORE a meal.     Follow-up as needed  LABS: hepatitis C RNA, CMP

## 2022-06-23 ENCOUNTER — TELEPHONE (OUTPATIENT)
Dept: INTERNAL MEDICINE CLINIC | Age: 47
End: 2022-06-23

## 2022-06-23 LAB
HCV QNT BY NAAT IU/ML: NOT DETECTED IU/ML
HCV QNT BY NAAT LOG IU/ML: NOT DETECTED LOG IU/ML
INTERPRETATION: NOT DETECTED

## 2022-06-23 NOTE — TELEPHONE ENCOUNTER
Pt called with oncerns of his BP and Blodd sugars being high. Pt states his Bp has still been running in the same area he was at his OV this week and same with his fasting BS. Please advise if Pt should come back in for an OV, we have openings tomorrow. Also his labs are in.

## 2022-06-24 ENCOUNTER — OFFICE VISIT (OUTPATIENT)
Dept: INTERNAL MEDICINE CLINIC | Age: 47
End: 2022-06-24

## 2022-06-24 ENCOUNTER — TELEPHONE (OUTPATIENT)
Dept: INTERNAL MEDICINE CLINIC | Age: 47
End: 2022-06-24

## 2022-06-24 VITALS
HEIGHT: 70 IN | OXYGEN SATURATION: 97 % | HEART RATE: 81 BPM | DIASTOLIC BLOOD PRESSURE: 95 MMHG | WEIGHT: 201 LBS | TEMPERATURE: 97 F | SYSTOLIC BLOOD PRESSURE: 146 MMHG | BODY MASS INDEX: 28.77 KG/M2

## 2022-06-24 DIAGNOSIS — B18.2 CHRONIC HEPATITIS C WITHOUT HEPATIC COMA (HCC): ICD-10-CM

## 2022-06-24 DIAGNOSIS — I10 PRIMARY HYPERTENSION: Primary | ICD-10-CM

## 2022-06-24 DIAGNOSIS — K21.9 GASTRIC REFLUX: ICD-10-CM

## 2022-06-24 PROCEDURE — 99214 OFFICE O/P EST MOD 30 MIN: CPT | Performed by: INTERNAL MEDICINE

## 2022-06-24 PROCEDURE — 93000 ELECTROCARDIOGRAM COMPLETE: CPT | Performed by: INTERNAL MEDICINE

## 2022-06-24 NOTE — PROGRESS NOTES
SUBJECTIVE:   Concern for elevated blood pressure. MEDICATIONS:  omeprazole (PRILOSEC) 40 MG capsule Take 1 capsule by mouth every morning (       Lab Results   Component Value Date    WBC 7.2 03/02/2022    HGB 16.3 03/02/2022     03/02/2022    MCV 89.7 03/02/2022     Lab Results   Component Value Date     06/21/2022    K 3.9 06/21/2022     06/21/2022    CO2 24 06/21/2022    BUN 14 06/21/2022    CREATININE 0.8 (L) 06/21/2022    GLUCOSE 122 (H) 06/21/2022     ECG (6/24) sinus rhythm. Within normal limits    OBJECTIVE:    BP (!) 146/95   Pulse 81   Temp 97 °F (36.1 °C)   Ht 5' 10\" (1.778 m)   Wt 201 lb (91.2 kg)   SpO2 97%   BMI 28.84 kg/m²   HEENT:  Oropharynx clear, no lymphadenopathy,   LUNGS:  Clear and without wheezes  HEART:  Regular rhythm, no appreciable murmur  ABD:  Benign, active bowel sounds  EXT:  No edema, pulses palpable  NEURO:  No focal neurologic deficits noted. ASSESSMENT / PLAN:   1. Hypertension:  Blood pressure elevated today at 146/95, goal less than 130/80. START taking amlodipine (Norvasc) 5 mg every evening. Check morning blood pressure, write down and bring to clinic. ECG normal.  2. Hepatitis C, genotype 1a/1b with quantitative RNA 3.94 UI/mL. RNA level (viral load) negative and liver enzymes normal following 12 week course of Epclusa (Sofosbuvir-Velpatasvir) 4/20. EGD and colonoscopy scheduled for 6/30 with Dr Raymond Kennedy  3. Gastric reflux:  Continue taking omeprazole (generic Prilosec) 20 mg daily.       Follow-up in two weeks

## 2022-06-24 NOTE — PATIENT INSTRUCTIONS
1. Hypertension:  Blood pressure elevated today at 146/95, goal less than 130/80. START taking amlodipine (Norvasc) 5 mg every evening. Check morning blood pressure, write down and bring to clinic. 2. Hepatitis C, genotype 1a/1b with quantitative RNA 3.94 UI/mL. RNA level (viral load) negative and liver enzymes normal.  3. Gastric reflux:  Continue taking omeprazole (generic Prilosec) 20 mg daily.   Continue taking dicyclomine (Bentyl) 10 mg capsule 2-4 times per day and BEFORE a meal.       Follow-up in two weeks

## 2022-06-24 NOTE — PROGRESS NOTES
Pt states his BP is raised consistently and he is concerned 140's/90's and fasting blood sugars over 120 -140.

## 2022-06-27 ENCOUNTER — TELEPHONE (OUTPATIENT)
Dept: INTERNAL MEDICINE CLINIC | Age: 47
End: 2022-06-27

## 2022-06-27 RX ORDER — AMLODIPINE BESYLATE 5 MG/1
5 TABLET ORAL DAILY
Qty: 30 TABLET | Refills: 3 | Status: SHIPPED | OUTPATIENT
Start: 2022-06-27 | End: 2022-10-27

## 2022-06-27 NOTE — TELEPHONE ENCOUNTER
RX never sent in for the Amlodipine from his visit on the 24 th.        Requested Prescriptions     Pending Prescriptions Disp Refills    amLODIPine (NORVASC) 5 MG tablet 30 tablet 3     Sig: Take 1 tablet by mouth daily

## 2022-06-28 ENCOUNTER — APPOINTMENT (OUTPATIENT)
Dept: GENERAL RADIOLOGY | Age: 47
End: 2022-06-28

## 2022-06-28 ENCOUNTER — APPOINTMENT (OUTPATIENT)
Dept: CT IMAGING | Age: 47
End: 2022-06-28

## 2022-06-28 ENCOUNTER — HOSPITAL ENCOUNTER (EMERGENCY)
Age: 47
Discharge: HOME | End: 2022-06-28
Attending: EMERGENCY MEDICINE
Payer: COMMERCIAL

## 2022-06-28 VITALS
HEART RATE: 60 BPM | SYSTOLIC BLOOD PRESSURE: 151 MMHG | HEIGHT: 71 IN | WEIGHT: 200 LBS | TEMPERATURE: 97.8 F | RESPIRATION RATE: 14 BRPM | BODY MASS INDEX: 28 KG/M2 | DIASTOLIC BLOOD PRESSURE: 101 MMHG | OXYGEN SATURATION: 100 %

## 2022-06-28 DIAGNOSIS — R07.9 CHEST PAIN, UNSPECIFIED TYPE: Primary | ICD-10-CM

## 2022-06-28 DIAGNOSIS — R74.8 ELEVATED LIPASE: ICD-10-CM

## 2022-06-28 LAB
A/G RATIO: 1.6 (ref 1.1–2.2)
ALBUMIN SERPL-MCNC: 4.6 G/DL (ref 3.4–5)
ALP BLD-CCNC: 46 U/L (ref 40–129)
ALT SERPL-CCNC: 46 U/L (ref 10–40)
ANION GAP SERPL CALCULATED.3IONS-SCNC: 11 MMOL/L (ref 3–16)
AST SERPL-CCNC: 38 U/L (ref 15–37)
BASOPHILS ABSOLUTE: 0 K/UL (ref 0–0.2)
BASOPHILS RELATIVE PERCENT: 0.6 %
BILIRUB SERPL-MCNC: 0.5 MG/DL (ref 0–1)
BUN BLDV-MCNC: 14 MG/DL (ref 7–20)
CALCIUM SERPL-MCNC: 9.1 MG/DL (ref 8.3–10.6)
CHLORIDE BLD-SCNC: 102 MMOL/L (ref 99–110)
CO2: 24 MMOL/L (ref 21–32)
CREAT SERPL-MCNC: 0.8 MG/DL (ref 0.9–1.3)
EKG ATRIAL RATE: 68 BPM
EKG DIAGNOSIS: NORMAL
EKG P AXIS: 51 DEGREES
EKG P-R INTERVAL: 148 MS
EKG Q-T INTERVAL: 378 MS
EKG QRS DURATION: 92 MS
EKG QTC CALCULATION (BAZETT): 401 MS
EKG R AXIS: 45 DEGREES
EKG T AXIS: 46 DEGREES
EKG VENTRICULAR RATE: 68 BPM
EOSINOPHILS ABSOLUTE: 0.3 K/UL (ref 0–0.6)
EOSINOPHILS RELATIVE PERCENT: 3.1 %
GFR AFRICAN AMERICAN: >60
GFR NON-AFRICAN AMERICAN: >60
GLUCOSE BLD-MCNC: 167 MG/DL (ref 70–99)
HCT VFR BLD CALC: 42.2 % (ref 40.5–52.5)
HEMOGLOBIN: 14.8 G/DL (ref 13.5–17.5)
LIPASE: 82 U/L (ref 13–60)
LYMPHOCYTES ABSOLUTE: 3.3 K/UL (ref 1–5.1)
LYMPHOCYTES RELATIVE PERCENT: 40.1 %
MCH RBC QN AUTO: 31.2 PG (ref 26–34)
MCHC RBC AUTO-ENTMCNC: 35.1 G/DL (ref 31–36)
MCV RBC AUTO: 88.9 FL (ref 80–100)
MONOCYTES ABSOLUTE: 0.8 K/UL (ref 0–1.3)
MONOCYTES RELATIVE PERCENT: 9.8 %
NEUTROPHILS ABSOLUTE: 3.8 K/UL (ref 1.7–7.7)
NEUTROPHILS RELATIVE PERCENT: 46.4 %
PDW BLD-RTO: 13.1 % (ref 12.4–15.4)
PLATELET # BLD: 233 K/UL (ref 135–450)
PMV BLD AUTO: 9 FL (ref 5–10.5)
POTASSIUM REFLEX MAGNESIUM: 3.8 MMOL/L (ref 3.5–5.1)
RBC # BLD: 4.75 M/UL (ref 4.2–5.9)
SODIUM BLD-SCNC: 137 MMOL/L (ref 136–145)
TOTAL PROTEIN: 7.4 G/DL (ref 6.4–8.2)
TROPONIN: <0.01 NG/ML
WBC # BLD: 8.1 K/UL (ref 4–11)

## 2022-06-28 PROCEDURE — 6370000000 HC RX 637 (ALT 250 FOR IP): Performed by: EMERGENCY MEDICINE

## 2022-06-28 PROCEDURE — 99285 EMERGENCY DEPT VISIT HI MDM: CPT

## 2022-06-28 PROCEDURE — 6360000004 HC RX CONTRAST MEDICATION: Performed by: EMERGENCY MEDICINE

## 2022-06-28 PROCEDURE — 83690 ASSAY OF LIPASE: CPT

## 2022-06-28 PROCEDURE — 93005 ELECTROCARDIOGRAM TRACING: CPT | Performed by: EMERGENCY MEDICINE

## 2022-06-28 PROCEDURE — 36415 COLL VENOUS BLD VENIPUNCTURE: CPT

## 2022-06-28 PROCEDURE — 93010 ELECTROCARDIOGRAM REPORT: CPT | Performed by: INTERNAL MEDICINE

## 2022-06-28 PROCEDURE — 85025 COMPLETE CBC W/AUTO DIFF WBC: CPT

## 2022-06-28 PROCEDURE — 80053 COMPREHEN METABOLIC PANEL: CPT

## 2022-06-28 PROCEDURE — 84484 ASSAY OF TROPONIN QUANT: CPT

## 2022-06-28 PROCEDURE — 71045 X-RAY EXAM CHEST 1 VIEW: CPT

## 2022-06-28 PROCEDURE — 71275 CT ANGIOGRAPHY CHEST: CPT

## 2022-06-28 RX ORDER — IBUPROFEN 600 MG/1
600 TABLET ORAL ONCE
Status: COMPLETED | OUTPATIENT
Start: 2022-06-28 | End: 2022-06-28

## 2022-06-28 RX ORDER — ASPIRIN 81 MG/1
162 TABLET, CHEWABLE ORAL ONCE
Status: COMPLETED | OUTPATIENT
Start: 2022-06-28 | End: 2022-06-28

## 2022-06-28 RX ORDER — ACETAMINOPHEN 325 MG/1
650 TABLET ORAL ONCE
Status: COMPLETED | OUTPATIENT
Start: 2022-06-28 | End: 2022-06-28

## 2022-06-28 RX ADMIN — ACETAMINOPHEN 650 MG: 325 TABLET ORAL at 04:44

## 2022-06-28 RX ADMIN — ASPIRIN 162 MG: 81 TABLET, CHEWABLE ORAL at 04:45

## 2022-06-28 RX ADMIN — IOPAMIDOL 85 ML: 755 INJECTION, SOLUTION INTRAVENOUS at 06:00

## 2022-06-28 RX ADMIN — IBUPROFEN 600 MG: 600 TABLET, FILM COATED ORAL at 04:44

## 2022-06-28 ASSESSMENT — PAIN - FUNCTIONAL ASSESSMENT
PAIN_FUNCTIONAL_ASSESSMENT: 0-10
PAIN_FUNCTIONAL_ASSESSMENT: 0-10

## 2022-06-28 ASSESSMENT — PAIN DESCRIPTION - ORIENTATION: ORIENTATION: LEFT

## 2022-06-28 ASSESSMENT — PAIN DESCRIPTION - LOCATION
LOCATION: CHEST;BACK
LOCATION: BACK;SHOULDER

## 2022-06-28 ASSESSMENT — PAIN SCALES - GENERAL
PAINLEVEL_OUTOF10: 5
PAINLEVEL_OUTOF10: 9

## 2022-06-28 NOTE — Clinical Note
Spring Dixon was seen and treated in our emergency department on 6/28/2022. He may return to work on 06/30/2022. If you have any questions or concerns, please don't hesitate to call.       Onur Johns MD

## 2022-06-28 NOTE — ED PROVIDER NOTES
[address] ED      CHIEF COMPLAINT  Hypertension (Patient states that his BP has been high over the past week. He was prescribed BP meds but hasn't started them yet because he was worried about the side effects. Patient states that he bent over this morning adn felt his shoulder blade \"pop\": adn now he has chest pain. l), Back Pain, and Chest Pain       HISTORY OF PRESENT ILLNESS  Emily Isaac is a 40 y.o. male to the emergency department for evaluation of high pressure readings over the past week. Patient reports he has been prescribed blood pressure medications by his PCP, but has not been able to start the medication yet. Says he was worried about possible side effects and wanted to look into the medication first.     Reports he started having pain to the right shoulder blade yesterday morning. Says he felt like there was a pop. He is not having pain when he pushes on the shoulder blade. Is having persistent pain. As the pain is not affected by movement and does not feel like it is related to his muscles. About 6 hours prior to coming in, he started having pain radiating anteriorly towards the chest.  Says he was concerned maybe a heart attack and came into the emergency department for evaluation. No associated shortness of breath. No lightheadedness. No fevers or chills. No recent illnesses. No fevers, chills, cough, or congestion. No lower extremity swelling. No other complaints, modifying factors or associated symptoms. I have reviewed the following from the nursing documentation.     Past Medical History:   Diagnosis Date    Asthma     as child    Hepatitis C antibody positive in blood 08/23/2021     Past Surgical History:   Procedure Laterality Date    ANKLE SURGERY      HAND SURGERY       Family History   Problem Relation Age of Onset    Diabetes Mother     High Blood Pressure Father     Atrial Fibrillation Father      Social History     Socioeconomic History    Marital status:      Spouse name: Not on file    Number of children: Not on file    Years of education: Not on file    Highest education level: Not on file   Occupational History    Not on file   Tobacco Use    Smoking status: Former Smoker     Packs/day: 0.50     Types: Cigarettes     Quit date: 2012     Years since quitting: 10.4    Smokeless tobacco: Former User     Types: [address] date: 9/6/2020   Vaping Use    Vaping Use: Every day    Substances: THC    Devices: Pre-filled or refillable cartridge   Substance and Sexual Activity    Alcohol use: Not Currently     Comment: 2-3 times a year    Drug use: Not Currently     Types: Marijuana Everett Divine)    Sexual activity: Yes     Partners: Female   Other Topics Concern    Not on file   Social History Narrative    Not on file     Social Determinants of Health     Financial Resource Strain:     Difficulty of Paying Living Expenses: Not on file   Food Insecurity:     Worried About [address] in the Last Year: Not on file    [address] in the Last Year: Not on file   Transportation Needs:     Lack of Transportation (Medical): Not on file    Lack of Transportation (Non-Medical):  Not on file   Physical Activity:     Days of Exercise per Week: Not on file    Minutes of Exercise per Session: Not on file   Stress:     Feeling of Stress : Not on file   Social Connections:     Frequency of Communication with Friends and Family: Not on file    Frequency of Social Gatherings with Friends and Family: Not on file    Attends [employer] Services: Not on file    Active Member of Clubs or Organizations: Not on file    Attends Club or Organization Meetings: Not on file    Marital Status: Not on file   Intimate Partner Violence:     Fear of Current or Ex-Partner: Not on file    Emotionally Abused: Not on file    Physically Abused: Not on file    Sexually Abused: Not on file   Housing Stability:     Unable to Pay for Housing in the Last Year: Not on file    Number of Places Lived in the Last Year: Not on file    Unstable Housing in the Last Year: Not on file     No current facility-administered medications for this encounter. Current Outpatient Medications   Medication Sig Dispense Refill    amLODIPine (NORVASC) 5 MG tablet Take 1 tablet by mouth daily (Patient not taking: Reported on 6/28/2022) 30 tablet 3    omeprazole (PRILOSEC) 40 MG delayed release capsule Take 1 capsule by mouth every morning (before breakfast) 30 capsule 5     No Known Allergies    PMH, Surgical Hx, FH, Social Hx reviewed by myself. REVIEW OF SYSTEMS  10 systems reviewed, pertinent positives per HPI otherwise noted to be negative. PHYSICAL EXAM  BP (!) 151/101   Pulse 60   Temp 97.8 °F (36.6 °C) (Oral)   Resp 14   Ht 5' 11\" (1.803 m)   Wt 200 lb (90.7 kg)   SpO2 100%   BMI 27.89 kg/m²    GENERAL APPEARANCE: Awake and alert. No acute distress. HENT: Normocephalic. Atraumatic. EOMI. No facial droop. HEART/CHEST: RRR. No tenderness to palpation over the chest wall. NECK/BACK: no tenderness to palpation over the right scapula or between the scapula. LUNGS: Respirations unlabored. Speaking comfortably in full sentences. ABDOMEN: Soft, non-distended abdomen. Non tender to palpation. No guarding. No rebound. EXTREMITIES: No gross deformities. Moving all extremities. Lower extremity edema. Negative Homans' sign bilaterally. SKIN: Warm and dry. No acute rashes. NEUROLOGICAL: Alert and oriented. No gross facial drooping. Answering questions appropriately. Moving all extremities. PSYCHIATRIC: Pleasant. Normal mood and affect.     LABS  Results for orders placed or performed during the hospital encounter of 06/28/22   CBC with Auto Differential   Result Value Ref Range    WBC 8.1 4.0 - 11.0 K/uL    RBC 4.75 4.20 - 5.90 M/uL    Hemoglobin 14.8 13.5 - 17.5 g/dL    Hematocrit 42.2 40.5 - 52.5 %    MCV 88.9 80.0 - 100.0 fL    MCH 31.2 26.0 - 34.0 pg    MCHC 35.1 31.0 - 36.0 g/dL    RDW 13.1 12.4 - 15.4 %    Platelets 486 290 - 829 K/uL    MPV 9.0 5.0 - 10.5 fL    Neutrophils % 46.4 %    Lymphocytes % 40.1 %    Monocytes % 9.8 %    Eosinophils % 3.1 %    Basophils % 0.6 %    Neutrophils Absolute 3.8 1.7 - 7.7 K/uL    Lymphocytes Absolute 3.3 1.0 - 5.1 K/uL    Monocytes Absolute 0.8 0.0 - 1.3 K/uL    Eosinophils Absolute 0.3 0.0 - 0.6 K/uL    Basophils Absolute 0.0 0.0 - 0.2 K/uL   Comprehensive Metabolic Panel w/ Reflex to MG   Result Value Ref Range    Sodium 137 136 - 145 mmol/L    Potassium reflex Magnesium 3.8 3.5 - 5.1 mmol/L    Chloride 102 99 - 110 mmol/L    CO2 24 21 - 32 mmol/L    Anion Gap 11 3 - 16    Glucose 167 (H) 70 - 99 mg/dL    BUN 14 7 - 20 mg/dL    CREATININE 0.8 (L) 0.9 - 1.3 mg/dL    GFR Non-African American >60 >60    GFR African American >60 >60    Calcium 9.1 8.3 - 10.6 mg/dL    Total Protein 7.4 6.4 - 8.2 g/dL    Albumin 4.6 3.4 - 5.0 g/dL    Albumin/Globulin Ratio 1.6 1.1 - 2.2    Total Bilirubin 0.5 0.0 - 1.0 mg/dL    Alkaline Phosphatase 46 40 - 129 U/L    ALT 46 (H) 10 - 40 U/L    AST 38 (H) 15 - 37 U/L   Troponin   Result Value Ref Range    Troponin <0.01 <0.01 ng/mL   Lipase   Result Value Ref Range    Lipase 82.0 (H) 13.0 - 60.0 U/L   EKG 12 Lead   Result Value Ref Range    Ventricular Rate 68 BPM    Atrial Rate 68 BPM    P-R Interval 148 ms    QRS Duration 92 ms    Q-T Interval 378 ms    QTc Calculation (Bazett) 401 ms    P Axis 51 degrees    R Axis 45 degrees    T Axis 46 degrees    Diagnosis       Normal sinus rhythmNormal ECGNo previous ECGs available       I have reviewed all labs for this visit. ECG  The Ekg interpreted by me shows  Normal sinus rhythm with a rate of 68  Axis is normal  QTc is normal  Intervals and Durations are unremarkable.       ST Segments: Nonspecific changes    RADIOLOGY  XR CHEST PORTABLE    Result Date: 6/28/2022  EXAMINATION: ONE XRAY VIEW OF THE CHEST 6/28/2022 4:58 am COMPARISON: 01/19/2022 HISTORY: ORDERING SYSTEM PROVIDED HISTORY: cp TECHNOLOGIST PROVIDED HISTORY: Reason for exam:->cp Reason for Exam: pain between shoulder blades FINDINGS: The cardiomediastinal silhouette is within normal limits. Calcified granuloma in the mid left lung field again demonstrated. There is no consolidation, pneumothorax or evidence for edema. No evidence for effusion. No acute osseous abnormality is identified. No acute airspace disease identified. CTA PULMONARY W CONTRAST    Result Date: 6/28/2022  EXAMINATION: CTA OF THE CHEST 6/28/2022 6:11 am TECHNIQUE: CTA of the chest was performed after the administration of intravenous contrast.  Multiplanar reformatted images are provided for review. MIP images are provided for review. Automated exposure control, iterative reconstruction, and/or weight based adjustment of the mA/kV was utilized to reduce the radiation dose to as low as reasonably achievable. COMPARISON: Chest radiograph today. CT abdomen and pelvis 01/20/2022. HISTORY: ORDERING SYSTEM PROVIDED HISTORY: pain in between scapula. cp TECHNOLOGIST PROVIDED HISTORY: Reason for exam:->pain in between scapula. cp Reason for Exam: pain in back between shoulder blades FINDINGS: Pulmonary Arteries: Pulmonary arteries are adequately opacified for evaluation. No evidence of intraluminal filling defect to suggest pulmonary embolism. Main pulmonary artery is normal in caliber. Mediastinum: No evidence of mediastinal lymphadenopathy. The heart and pericardium demonstrate no acute abnormality. There is no acute abnormality of the thoracic aorta. Lungs/pleura: The lungs are without acute process. Sequela of old granulomatous disease. No focal consolidation or pulmonary edema. No evidence of pleural effusion or pneumothorax. Upper Abdomen: No acute findings. Small upper abdominal lymph nodes again demonstrated. Soft Tissues/Bones: No acute bone or soft tissue abnormality.      No evidence of pulmonary embolism or acute pulmonary abnormality. ED COURSE/[facility]  Patient seen and evaluated. At presentation, patient was awake, alert, afebrile, hypertensive to 159/95, and satting 98% on room air. No lower extremity edema present. Differential diagnosis includes ACS, arrhythmia, PE, pancreatitis, others. Reports taking 2 baby aspirin prior to arrival to the ED. He was ordered 2 baby aspirin, ibuprofen, and Tylenol for pain. He has borderline elevated lipase at 82. Troponin negative. Given the duration of symptoms, serial troponins not indicated at this time. Liver enzymes ALT 46, AST 38.  no significant change compared to baseline. Chest x-ray shows no focal consolidation. As patient denies change in symptoms with movement, and I am unable to elicit pain by palpating over the scapula or the back, I do think CTA is warranted to assess for aortic dissection and or pulmonary embolus. CTA obtained and shows no evidence of pulmonary embolism or dissection. These were discussed with patient who was reassured. His heart score is 2. MACE less than 2%. He was provided with referral for cardiology. He was instructed to establish care for further evaluation and treatment. He verbalized understanding and was agreeable with plan. Discharged home with strict return precautions. Is this patient to be included in the SEP-1 Core Measure due to severe sepsis or septic shock? No   Exclusion criteria - the patient is NOT to be included for SEP-1 Core Measure due to:  2+ SIRS criteria are not met     Pt was seen during the COVID 19 pandemic. Appropriate PPE worn by ME during patient encounters. Patient was cared for during a time with constrained hospital bed capacity with nationwide stress on resources and staffing.       During the patient's ED course, the patient was given:  Medications   aspirin chewable tablet 162 mg (162 mg Oral Given 6/28/22 4176)   ibuprofen (ADVIL;MOTRIN) tablet 600 mg (600 mg Oral Given 6/28/22 6833) acetaminophen (TYLENOL) tablet 650 mg (650 mg Oral Given 6/28/22 0544)   iopamidol (ISOVUE-370) 76 % injection 85 mL (85 mLs IntraVENous Given 6/28/22 0600)        CLINICAL IMPRESSION  1. Chest pain, unspecified type    2. Elevated lipase        Blood pressure (!) 151/101, pulse 60, temperature 97.8 °F (36.6 °C), temperature source Oral, resp. rate 14, height 5' 11\" (1.803 m), weight 200 lb (90.7 kg), SpO2 100 %. DISPOSITION  Marisela Farfan was discharged home in stable condition. Patient was given scripts for the following medications. I counseled patient how to take these medications. Discharge Medication List as of 6/28/2022  6:37 AM          Follow-up with:  Melinda Hair MD  [address] [facility] [address]  [phone]    In 1 day      [address]  [address]  [phone]  Call today        DISCLAIMER: This chart was created using Dragon dictation software. Efforts were made by me to ensure accuracy, however some errors may be present due to limitations of this technology and occasionally words are not transcribed correctly.         Jose Bennett MD  06/29/22 1336

## 2022-07-06 ENCOUNTER — HOSPITAL ENCOUNTER (OUTPATIENT)
Dept: ULTRASOUND IMAGING | Age: 47
Discharge: HOME | End: 2022-07-06
Payer: COMMERCIAL

## 2022-07-06 DIAGNOSIS — R10.11 RUQ PAIN: ICD-10-CM

## 2022-07-06 PROCEDURE — 76705 ECHO EXAM OF ABDOMEN: CPT

## 2022-07-07 RX ORDER — LIDOCAINE 50 MG/G
1 PATCH TOPICAL PRN
COMMUNITY
Start: 2022-07-03 | End: 2022-10-27

## 2022-07-07 RX ORDER — IBUPROFEN 800 MG/1
800 TABLET ORAL EVERY 8 HOURS PRN
COMMUNITY
Start: 2022-07-03 | End: 2022-10-27

## 2022-07-19 ENCOUNTER — HOSPITAL ENCOUNTER (EMERGENCY)
Age: 47
Discharge: HOME | End: 2022-07-19
Payer: COMMERCIAL

## 2022-07-19 ENCOUNTER — APPOINTMENT (OUTPATIENT)
Dept: GENERAL RADIOLOGY | Age: 47
End: 2022-07-19
Payer: COMMERCIAL

## 2022-07-19 VITALS
RESPIRATION RATE: 16 BRPM | HEART RATE: 86 BPM | WEIGHT: 200 LBS | TEMPERATURE: 98 F | BODY MASS INDEX: 28.63 KG/M2 | OXYGEN SATURATION: 96 % | DIASTOLIC BLOOD PRESSURE: 91 MMHG | HEIGHT: 70 IN | SYSTOLIC BLOOD PRESSURE: 138 MMHG

## 2022-07-19 DIAGNOSIS — S62.317A CLOSED DISPLACED FRACTURE OF BASE OF FIFTH METACARPAL BONE OF LEFT HAND, INITIAL ENCOUNTER: ICD-10-CM

## 2022-07-19 DIAGNOSIS — Z23 NEED FOR TETANUS BOOSTER: ICD-10-CM
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
## 2022-07-19 DIAGNOSIS — S62.142A CLOSED DISPLACED FRACTURE OF BODY OF HAMATE OF LEFT WRIST, INITIAL ENCOUNTER: ICD-10-CM

## 2022-07-19 PROCEDURE — 73110 X-RAY EXAM OF WRIST: CPT

## 2022-07-19 PROCEDURE — 6370000000 HC RX 637 (ALT 250 FOR IP): Performed by: PHYSICIAN ASSISTANT

## 2022-07-19 PROCEDURE — 90471 IMMUNIZATION ADMIN: CPT | Performed by: PHYSICIAN ASSISTANT

## 2022-07-19 PROCEDURE — 99284 EMERGENCY DEPT VISIT MOD MDM: CPT

## 2022-07-19 PROCEDURE — 73130 X-RAY EXAM OF HAND: CPT

## 2022-07-19 PROCEDURE — 90715 TDAP VACCINE 7 YRS/> IM: CPT | Performed by: PHYSICIAN ASSISTANT

## 2022-07-19 PROCEDURE — 6360000002 HC RX W HCPCS: Performed by: PHYSICIAN ASSISTANT

## 2022-07-19 PROCEDURE — 12002 RPR S/N/AX/GEN/TRNK2.6-7.5CM: CPT

## 2022-07-19 RX ORDER — OXYCODONE HYDROCHLORIDE AND ACETAMINOPHEN 5; 325 MG/1; MG/1
1 TABLET ORAL EVERY 8 HOURS PRN
Qty: 5 TABLET | Refills: 0 | Status: SHIPPED | OUTPATIENT
Start: 2022-07-19 | End: 2022-07-22

## 2022-07-19 RX ORDER — AMOXICILLIN AND CLAVULANATE POTASSIUM 875; 125 MG/1; MG/1
1 TABLET, FILM COATED ORAL 2 TIMES DAILY
Qty: 20 TABLET | Refills: 0 | Status: SHIPPED | OUTPATIENT
Start: 2022-07-19 | End: 2022-07-29

## 2022-07-19 RX ORDER — OXYCODONE HYDROCHLORIDE AND ACETAMINOPHEN 5; 325 MG/1; MG/1
1 TABLET ORAL ONCE
Status: COMPLETED | OUTPATIENT
Start: 2022-07-19 | End: 2022-07-19

## 2022-07-19 RX ORDER — AMOXICILLIN AND CLAVULANATE POTASSIUM 875; 125 MG/1; MG/1
1 TABLET, FILM COATED ORAL ONCE
Status: COMPLETED | OUTPATIENT
Start: 2022-07-19 | End: 2022-07-19

## 2022-07-19 RX ORDER — HYDROCODONE BITARTRATE AND ACETAMINOPHEN 5; 325 MG/1; MG/1
1 TABLET ORAL ONCE
Status: COMPLETED | OUTPATIENT
Start: 2022-07-19 | End: 2022-07-19

## 2022-07-19 RX ADMIN — TETANUS TOXOID, REDUCED DIPHTHERIA TOXOID AND ACELLULAR PERTUSSIS VACCINE, ADSORBED 0.5 ML: 5; 2.5; 8; 8; 2.5 SUSPENSION INTRAMUSCULAR at 10:30

## 2022-07-19 RX ADMIN — HYDROCODONE BITARTRATE AND ACETAMINOPHEN 1 TABLET: 5; 325 TABLET ORAL at 10:29

## 2022-07-19 RX ADMIN — AMOXICILLIN AND CLAVULANATE POTASSIUM 1 TABLET: 875; 125 TABLET, FILM COATED ORAL at 10:29

## 2022-07-19 RX ADMIN — OXYCODONE HYDROCHLORIDE AND ACETAMINOPHEN 1 TABLET: 5; 325 TABLET ORAL at 12:31

## 2022-07-19 ASSESSMENT — PAIN DESCRIPTION - DESCRIPTORS
DESCRIPTORS: ACHING
DESCRIPTORS: DISCOMFORT

## 2022-07-19 ASSESSMENT — ENCOUNTER SYMPTOMS
CHEST TIGHTNESS: 0
SINUS PRESSURE: 0
CONSTIPATION: 0
NAUSEA: 0
ABDOMINAL PAIN: 0
EYE DISCHARGE: 0
SHORTNESS OF BREATH: 0
COUGH: 0
SINUS PAIN: 0
DIARRHEA: 0
VOMITING: 0
SORE THROAT: 0
RHINORRHEA: 0
EYE REDNESS: 0

## 2022-07-19 ASSESSMENT — PAIN DESCRIPTION - ORIENTATION
ORIENTATION: LEFT

## 2022-07-19 ASSESSMENT — PAIN - FUNCTIONAL ASSESSMENT
PAIN_FUNCTIONAL_ASSESSMENT: 0-10
PAIN_FUNCTIONAL_ASSESSMENT: PREVENTS OR INTERFERES SOME ACTIVE ACTIVITIES AND ADLS

## 2022-07-19 ASSESSMENT — PAIN SCALES - GENERAL
PAINLEVEL_OUTOF10: 9
PAINLEVEL_OUTOF10: 8
PAINLEVEL_OUTOF10: 8

## 2022-07-19 ASSESSMENT — PAIN DESCRIPTION - LOCATION
LOCATION: HAND
LOCATION: ARM;HAND;WRIST
LOCATION: ARM

## 2022-07-19 ASSESSMENT — PAIN DESCRIPTION - ONSET: ONSET: ON-GOING

## 2022-07-19 ASSESSMENT — PAIN DESCRIPTION - FREQUENCY: FREQUENCY: CONTINUOUS

## 2022-07-19 ASSESSMENT — PAIN DESCRIPTION - PAIN TYPE: TYPE: ACUTE PAIN

## 2022-07-19 NOTE — ED NOTES
1214  As per provider order, a Volare splint was applied to the patient's left forearm and wrist. Good capillary refill, movement, and sensation noted in the patient's fingertips before and after application of splint. Care of the splint and need for orthopedic follow up explained to the patient, whom verbalized understanding.      Heidi Witt  07/19/22 5356

## 2022-07-19 NOTE — ED PROVIDER NOTES
[address] ED  EMERGENCY DEPARTMENT ENCOUNTER        Pt Name: Katie Duron  MRN: [id number]  [place] 1975  Date of evaluation: 7/19/2022  Provider: Elizabeth Rehman PA-C  PCP: Hassel Simmonds, MD  ED Attending: No att. providers found      This patient was not seen and evaluated by the attending physician   I have independently evaluated this patient. CHIEF COMPLAINT       Chief Complaint   Patient presents with    Animal Bite     Was walking his dog, another dog broke its chain and attacked, left hand/wrist lac with pain and swelling       HISTORY OF PRESENT ILLNESS   (Location/Symptom, Timing/Onset, Context/Setting, Quality, Duration, Modifying Factors, Severity)  Note limiting factors. Katie Duron is a 40 y.o. male for evaluation via private vehicle with a complaint of a injury to his left hand which occurred 30 minutes prior to arrival. Pt advised he was walking his dog, when it was attacked by a pitbull. He got in between the dogs,trying to break up their fight, sustained a laceration to his left wrist and several lacerations to his fingers. He indicates the most severe pain is to his pinky. 4/10 aching pain. Patient advised he is right-hand dominant. Uncertain his last tetanus shot. No treatment PTA. Pain is worse with movement and use. It is not improved by anything. Pt advised his dog is UTD on immunizations, uncertain about the dog which attacked him, his wife is trying to get a hold of dog owner at this time to find out information about his shots. Nursing Notes were all reviewed and agreed with or any disagreements were addressed  in the HPI. REVIEW OF SYSTEMS  (2-9 systems for level 4, 10 or more for level 5)     Review of Systems   Constitutional:  Negative for chills and fever. HENT: Negative. Negative for congestion, rhinorrhea, sinus pressure, sinus pain and sore throat. Eyes:  Negative for discharge, redness and visual disturbance. Respiratory:  Negative for cough, chest tightness and shortness of breath. Cardiovascular:  Negative for chest pain and palpitations. Gastrointestinal:  Negative for abdominal pain, constipation, diarrhea, nausea and vomiting. Genitourinary:  Negative for difficulty urinating, dysuria and frequency. Musculoskeletal: Negative. Skin:  Positive for wound. Neurological: Negative. Negative for dizziness, weakness, numbness and headaches. Psychiatric/Behavioral: Negative. All other systems reviewed and are negative. Positivesand Pertinent negatives as per HPI. Except as noted above in the ROS, all other systems were reviewed and negative. PAST MEDICAL HISTORY     Past Medical History:   Diagnosis Date    Asthma     as child    Hepatitis C antibody positive in blood 08/23/2021         SURGICAL HISTORY       Past Surgical History:   Procedure Laterality Date    ANKLE SURGERY      HAND SURGERY           CURRENT MEDICATIONS       Discharge Medication List as of 7/19/2022 12:24 PM        CONTINUE these medications which have NOT CHANGED    Details   lidocaine (LIDODERM) 5 % Place 1 patch onto the skin every 24 hoursHistorical Med      ibuprofen (ADVIL;MOTRIN) 800 MG tablet Take 800 mg by mouth every 8 hours as neededHistorical Med      amLODIPine (NORVASC) 5 MG tablet Take 1 tablet by mouth daily, Disp-30 tablet, R-3Normal      omeprazole (PRILOSEC) 40 MG delayed release capsule Take 1 capsule by mouth every morning (before breakfast), Disp-30 capsule, R-5Normal               ALLERGIES     Patient has no known allergies.     FAMILY HISTORY       Family History   Problem Relation Age of Onset    Diabetes Mother     High Blood Pressure Father     Atrial Fibrillation Father          SOCIAL HISTORY       Social History     Socioeconomic History    Marital status:    Tobacco Use    Smoking status: Former     Packs/day: 0.50     Types: Cigarettes     Quit date: 2012     Years since quitting: 10.5    Smokeless tobacco: Former     Types: Chew     Quit date: 9/6/2020   Vaping Use    Vaping Use: Former   Substance and Sexual Activity    Alcohol use: Not Currently     Comment: 2-3 times a year    Drug use: Not Currently     Types: Marijuana Champ Cue)     Comment: STOPPED 3 MONTHS AGO    Sexual activity: Yes     Partners: Female       SCREENINGS     NIH Score       Glascow Raymond Coma Scale  Eye Opening: Spontaneous  Best Verbal Response: Oriented  Best Motor Response: Obeys commands  Raymond Coma Scale Score: 15    [facility]     Heart Score         PHYSICAL EXAM    (up to 7 for level 4, 8 ormore for level 5)     ED Triage Vitals [07/19/22 0952]   BP Temp Temp Source Heart Rate Resp SpO2 Height Weight   132/85 98 °F (36.7 °C) Oral 90 18 100 % 5' 10\" (1.778 m) 200 lb (90.7 kg)       Physical Exam  Vitals and nursing note reviewed. Constitutional:       Appearance: Normal appearance. He is well-developed. He is not diaphoretic. HENT:      Head: Normocephalic and atraumatic. Nose: Nose normal.      Mouth/Throat:      Mouth: Mucous membranes are moist.      Pharynx: No oropharyngeal exudate. Eyes:      General:         Right eye: No discharge. Left eye: No discharge. Extraocular Movements: Extraocular movements intact. Conjunctiva/sclera: Conjunctivae normal.      Pupils: Pupils are equal, round, and reactive to light. Cardiovascular:      Rate and Rhythm: Normal rate and regular rhythm. Heart sounds: Normal heart sounds. No murmur heard. No friction rub. No gallop. Pulmonary:      Effort: Pulmonary effort is normal. No respiratory distress. Breath sounds: Normal breath sounds. No wheezing. Abdominal:      General: Bowel sounds are normal. There is no distension. Palpations: Abdomen is soft. Tenderness: There is no abdominal tenderness. Musculoskeletal:         General: Normal range of motion. Cervical back: Normal range of motion.    Skin: used to completely anesthetize the wound. It was copiously irrigated. It was explored to its depth in a bloodless field with no sign of tendon, nerve, or vascular injury. No foreign bodies were identified. It was closed with simple erupted suture, 4-0 Vicryl. Loose approximation of skin edges due to it being from an animal bite ,there were no complications during the procedure. Is this patient to be included in the SEP-1 Core Measure due to severe sepsis or septic shock? No   Exclusion criteria - the patient is NOT to be included for SEP-1 Core Measure due to:  2+ SIRS criteria are not met     CONSULTS:  None      EMERGENCY DEPARTMENT COURSE and DIFFERENTIAL DIAGNOSIS/MDM:   Vitals:    Vitals:    07/19/22 0952 07/19/22 1231   BP: 132/85 (!) 138/91   Pulse: 90 86   Resp: 18 16   Temp: 98 °F (36.7 °C)    TempSrc: Oral    SpO2: 100% 96%   Weight: 200 lb (90.7 kg)    Height: 5' 10\" (1.778 m)        Patient was given the following medications:  Medications   Tetanus-Diphth-Acell Pertussis (BOOSTRIX) injection 0.5 mL (0.5 mLs IntraMUSCular Given 7/19/22 1030)   amoxicillin-clavulanate (AUGMENTIN) 875-125 MG per tablet 1 tablet (1 tablet Oral Given 7/19/22 1029)   HYDROcodone-acetaminophen (NORCO) 5-325 MG per tablet 1 tablet (1 tablet Oral Given 7/19/22 1029)   oxyCODONE-acetaminophen (PERCOCET) 5-325 MG per tablet 1 tablet (1 tablet Oral Given 7/19/22 1231)         Afebrile, stable, patient presents to the ED for evaluation. Nontoxic patient in no acute distress. SPO2 on room air 100% the patient's not hypoxic. Significant amount of time spent at bedside in discussion that rabies prophylaxis versus patient trying to obtain shot records. At this time patient's wife leaves the ED to go speak with the dog owner, to verify that the dog has had his rabies vaccinations. Patient will be started on antibiotics and tetanus is updated he is provided with pain control.   X-rays are obtained of his hand and wrist wound care performed. Patient initially refused any laceration repair or suture placement. However after discussion agreed to allowing me to place 1 simple interrupted suture to the largest of the lacerations. Advised suture removal in 10 days time. X-ray imaging returns revealing an acute displaced fracture of the hamate and a displaced fracture of the fifth metacarpal bone. Patient is immobilized in a volar splint. He initially was provided with Norco which did not improve his pain on reevaluation therefore he was also ordered a Percocet provided with antibiotics and tetanus. Significant discussion regarding rabies prophylaxis patient advised he will reach out to [address] [place] [address], and return to the department for rabies prophylaxis if they are unable to obtain shot records or quarantine the animal and attacked him. All questions are answered. Indications for return to the ED are discussed. Patient is advised if any new or worsening symptoms arise they should immediately return to the emergency room. Follow-up with orthopedics/hand surgery/ primary care in 1-2 days. The patient tolerated their visit well. The patient and / or the family were informed of the results of any tests, a time was given to answer questions, a plan was proposed and they agreed Nhi Ferrer. I estimate there is LOW risk for CELLULITIS, COMPARTMENT SYNDROME, NECROTIZING FASCIITIS, TENDON OR NEUROVASCULAR INJURY, or FOREIGN BODY, thus I consider the discharge disposition reasonable. Also, there is no evidence or peritonitis, sepsis, or toxicity. Klaudia Mckeon and I have discussed the diagnosis and risks, and we agree with discharging home to follow-up with their primary doctor. We also discussed returning to the Emergency Department immediately if new or worsening symptoms occur.  We have discussed the symptoms which are most concerning (e.g., changing or worsening pain, fever, numbness, weakness, cool or painful digits) that necessitate immediate return. FINAL IMPRESSION      1. Dog bite, initial encounter    2. Need for tetanus booster    3. Closed displaced fracture of body of hamate of left wrist, initial encounter    4. Closed displaced fracture of base of fifth metacarpal bone of left hand, initial encounter          DISPOSITION/PLAN   DISPOSITION Decision To Discharge 07/19/2022 12:24:28 PM      PATIENT REFERRED TO:  Nehal Chandler MD  [facility] [address]  [phone]    Schedule an appointment as soon as possible for a visit   For wound re-check in 2-3 days    [address]  [phone]    for a recheck in 1-2  days      DISCHARGE MEDICATIONS:  Discharge Medication List as of 7/19/2022 12:24 PM        START taking these medications    Details   amoxicillin-clavulanate (AUGMENTIN) 875-125 MG per tablet Take 1 tablet by mouth in the morning and 1 tablet before bedtime. Do all this for 10 days. , Disp-20 tablet, R-0Normal      oxyCODONE-acetaminophen (PERCOCET) 5-325 MG per tablet Take 1 tablet by mouth every 8 hours as needed for Pain (only for most severe pain) for up to 3 days. Intended supply: 3 days. Take lowest dose possible to manage pain, Disp-5 tablet, R-0Normal             DISCONTINUED MEDICATIONS:  Discharge Medication List as of 7/19/2022 12:24 PM                 Pt was seen during the [place] 19 pandemic. Appropriate PPE worn by ME during patient encounters. Pt seen during a time with constrained hospital bed capacity and other potential inpatient and outpatient resources were constrained due to the viral pandemic.    Please note that portions of this note were completed with a voice recognition program.  Efforts were made to edit the dictations but occasionally words are mis-transcribed.)    Alexander Dhaliwal PA-C (electronically signed)        Alexander Dhaliwal PA-C  07/20/22 3725

## 2022-07-19 NOTE — DISCHARGE INSTRUCTIONS
Please return for rabies prophylaxis if the animal who bit you has not had rabies vaccine/can not be quarantined and observed. Take all antibiotics until you have completed the course. Tylenol or Motrin for pain control. Follow up with Orthopedics   Suture will need to be removed in 10 days.  Return to this dept or see your PCP/Orthopedics for removal.

## 2022-07-19 NOTE — ED NOTES
Patient discharged in stable, ambulatory condition with all documented belongings. This nurse reviewed discharge instructions, pt verbalized understanding.        Ana Zuniga RN  07/19/22 3184

## 2022-07-20 ENCOUNTER — HOSPITAL ENCOUNTER (OUTPATIENT)
Dept: CT IMAGING | Age: 47
Discharge: HOME | End: 2022-07-20
Payer: COMMERCIAL
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
## 2022-07-20 DIAGNOSIS — S61.452A DOG BITE OF HAND WITHOUT COMPLICATION, LEFT, INITIAL ENCOUNTER: ICD-10-CM

## 2022-07-20 DIAGNOSIS — S61.452A DOG BITE OF LEFT HAND, INITIAL ENCOUNTER: ICD-10-CM
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
## 2022-07-20 PROCEDURE — 73200 CT UPPER EXTREMITY W/O DYE: CPT

## 2022-07-21 RX ORDER — HYDROCODONE BITARTRATE AND ACETAMINOPHEN 5; 325 MG/1; MG/1
1 TABLET ORAL EVERY 6 HOURS PRN
COMMUNITY
End: 2022-10-27

## 2022-07-21 NOTE — PROGRESS NOTES
Place patient label inside box (if no patient label, complete below)  Name:  :  MR#:     Betina Every / PROCEDURE  I (we), Denisse Rivas (Patient Name) authorize DR. Therese Crandall (Provider / Yesenia Bahena) and/or such assistants as may be selected by him/her, to perform the following operation/procedure(s): CLOSED VERSUS OPEN REDUCTION, INTERNAL VERSUS PERCUTANEOUS PIN FIXATION OF LEFT HAND SMALL FINGER CARPOMETACARPAL JOINT; ANY INDICATED PROCEDURES       Note: If unable to obtain consent prior to an emergent procedure, document the emergent reason in the medical record. This procedure has been explained to my (our) satisfaction and included in the explanation was: The intended benefit, nature, and extent of the procedure to be performed; The significant risks involved and the probability of success; Alternative procedures and methods of treatment; The dangers and probable consequences of such alternatives (including no procedure or treatment); The expected consequences of the procedure on my future health; Whether other qualified individuals would be performing important surgical tasks and/or whether  would be present to advise or support the procedure. I (we) understand that there are other risks of infection and other serious complications in the pre-operative/procedural and postoperative/procedural stages of my (our) care. I (we) have asked all of the questions which I (we) thought were important in deciding whether or not to undergo treatment or diagnosis. These questions have been answered to my (our) satisfaction. I (we) understand that no assurance can be given that the procedure will be a success, and no guarantee or warranty of success has been given to me (us).     It has been explained to me (us) that during the course of the operation/procedure, unforeseen conditions may be revealed that necessitate extension of the original procedure(s) or different procedure(s) than those set forth in Paragraph 1. I (we) authorize and request that the above-named physician, his/her assistants or his/her designees, perform procedures as necessary and desirable if deemed to be in my (our) best interest.     Revised 8/2/2021                                                                          Page 1 of 2       I acknowledge that health care personnel may be observing this procedure for the purpose of medical education or other specified purposes as may be necessary as requested and/or approved by my (our) physician. I (we) consent to the disposal by the hospital Pathologist of the removed tissue, parts or organs in accordance with hospital policy. I do ____ do not ____ consent to the use of a local infiltration pain blocking agent that will be used by my provider/surgical provider to help alleviate pain during my procedure. I do ____ do not ____ consent to an emergent blood transfusion in the case of a life-threatening situation that requires blood components to be administered. This consent is valid for 24 hours from the beginning of the procedure. This patient does ____ or does not ____ currently have a DNR status/order. If DNR order is in place, obtain Addendum to the Surgical Consent for ALL Patients with a DNR Order to address tanisha-operative status for limited intervention or DNR suspension.      I have read and fully understand the above Consent for Operation/Procedure and that all blanks were completed before I signed the consent.   _____________________________       _____________________      ____/____am/pm  Signature of Patient or legal representative      Printed Name / Relationship            Date / Time   ____________________________       _____________________      ____/____am/pm  Witness to Signature                                    Printed Name                    Date / Time    If patient is unable to sign or is a minor, complete the following)  Patient is a minor, ____ years of age, or unable to sign because:   ______________________________________________________________________________________________    If a phone consent is obtained, consent will be documented by using two health care professionals, each affirming that the consenting party has no questions and gives consent for the procedure discussed with the physician/provider.   _____________________          ____________________       _____/_____am/pm   2nd witness to phone consent        Printed name           Date / Time    Informed Consent:  I have provided the explanation described above in section 1 to the patient and/or legal representative.  I have provided the patient and/or legal representative with an opportunity to ask any questions about the proposed operation/procedure.   ___________________________          ____________________         ____/____am/pm  Provider / Proceduralist                            Printed name            Date / Time  Revised 8/2/2021                                                                      Page 2 of 2

## 2022-07-21 NOTE — PROGRESS NOTES
[address] PRE-SURGICAL TESTING INSTRUCTIONS           OR 7/25/22                       PRIOR TO PROCEDURE DATE:        1. PLEASE FOLLOW ANY  GUIDELINES/ INSTRUCTIONS PRIOR TO YOUR PROCEDURE AS ADVISED BY YOUR SURGEON. 2. Arrange for someone to drive you home and be with you for the first 24 hours after discharge for your safety after your procedure for which you received sedation. Ensure it is someone we can share information with regarding your discharge. 3. You must contact your surgeon for instructions IF:  You are taking any blood thinners, aspirin, anti-inflammatory or vitamin E. There is a change in your physical condition such as a cold, fever, rash, cuts, sores or any other infection, especially near your surgical site. 4. Do not drink alcohol the day before or day of your procedure. 5. A Pre-op History and Physical for surgery MUST be completed by your Physician or Urgent Care within 30 days of your procedure date. Please bring a copy with you on the day of your procedure and along with any other testing performed. THE DAY OF YOUR PROCEDURE:  1. Follow instructions for ARRIVAL TIME as DIRECTED BY YOUR SURGEON. 2. Enter the MAIN entrance from [employer] and follow the signs to the free [employer] or [place] parking (offered free of charge 6am-5pm). 3. Enter the Main Entrance of the hospital (do not enter from the lower level of the parking garage). Upon entrance, check in with the  at the main desk on your left. If no one is available at the desk, proceed into the [facility] Waiting Room and go through the door directly into the [facility]. There is a Check-in desk ACROSS from Room 5 (marked with a sign hanging from the ceiling). The phone number for the surgery center is [phone]. 4. Please call [phone] option #2 option #2 if you have not been preregistered yet.   On the day of your procedure bring your insurance card Check or credit card, if you wish to pay your co-pay the day of surgery. 12. If you are to stay overnight, you may bring a bag with personal items. Please have any large items you may need brought in by your family after your arrival to your hospital room. 15. If you have a Living Will or Durable Power of , please bring a copy on the day of your procedure. 15. With your permission, one family member may accompany you while you are being prepared for surgery. Once you are ready, additional family members may join you. HOW WE KEEP YOU SAFE and WORK TO PREVENT SURGICAL SITE INFECTIONS:  1. Health care workers should always check your ID bracelet to verify your name and birth date. You will be asked many times to state your name, date of birth, and allergies. 2. Health care workers should always clean their hands with soap or alcohol gel before providing care to you. It is okay to ask anyone if they cleaned their hands before they touch you. 3. You will be actively involved in verifying the type of procedure you are having and ensuring the correct surgical site. This will be confirmed multiple times prior to your procedure. Do NOT reese your surgery site UNLESS instructed to by your surgeon. 4. Do not shave or wax for 72 hours prior to procedure near your operative site. Shaving with a razor can irritate your skin and make it easier to develop an infection. On the day of your procedure, any hair that needs to be removed near the surgical site will be clipped by a healthcare worker using a special clippers designed to avoid skin irritation. 5. When you are in the operating room, your surgical site will be cleansed with a special soap, and in most cases, you will be given an antibiotic before the surgery begins. What to expect AFTER YOUR PROCEDURE:  1. Immediately following your procedure, your will be taken to the PACU for the first phase of your recovery.   Your nurse will help you recover from any potential side effects of anesthesia, such as extreme drowsiness, changes in your vital signs or breathing patterns. Nausea, headache, muscle aches, or sore throat may also occur after anesthesia. Your nurse will help you manage these potential side effects. 2. For comfort and safety, arrange to have someone at home with you for the first 24 hours after discharge. 3. You and your family will be given written instructions about your diet, activity, dressing care, medications, and return visits. 4. Once at home, should issues with nausea, pain, or bleeding occur, or should you notice any signs of infection, you should call your surgeon. 5. Always clean your hands before and after caring for your wound. Do not let your family touch your surgery site without cleaning their hands. 6. Narcotic pain medications can cause significant constipation. You may want to add a stool softener to your postoperative medication schedule or speak to your surgeon on how best to manage this SIDE EFFECT. SPECIAL INSTRUCTIONS - CALL AND DISCUSS ARRIVAL TIME AND IBUPROFEN RX STRENGTH WITH DR. Obinna Gupta OFFICE TOMORROW. Thank you for allowing us to care for you. We strive to exceed your expectations in the delivery of care and service provided to you and your family. If you need to contact the [place] [address] staff for any reason, please call us at [phone]    Instructions reviewed with patient during preadmission testing phone interview.   Harper Rodney RN.7/21/2022 .3:30 PM      ADDITIONAL EDUCATIONAL INFORMATION REVIEWED PER PHONE WITH YOU AND/OR YOUR FAMILY:  No Hibiclens® Bathing Instructions   Yes Antibacterial Soap

## 2022-07-21 NOTE — PROGRESS NOTES
7. 21.22 @ 7344 UNABLE TO REACH PT OR LMOR AS MAILBOX FULL /AG    7/21/22 @ 1074 REACHED WIFE, EMERGENCY CONTACT, AND SHE WILL HAVE PATIENT CALL BACK TO PAT. INFORMED WOULD NEED A PRESURGICAL H&P FROM PCP OR URGENT CARE SPECIFICALLY FOR SURGERY AND THAT THE ER VISIT WOULD NOT SUFFICE.  VERBALIZED UNDERSTANDING /AG

## 2022-07-22 ENCOUNTER — ANESTHESIA EVENT (OUTPATIENT)
Dept: OPERATING ROOM | Age: 47
End: 2022-07-22
Payer: COMMERCIAL

## 2022-07-25 ENCOUNTER — HOSPITAL ENCOUNTER (OUTPATIENT)
Age: 47
Setting detail: OUTPATIENT SURGERY
Discharge: HOME | End: 2022-07-25
Attending: ORTHOPAEDIC SURGERY | Admitting: ORTHOPAEDIC SURGERY
Payer: COMMERCIAL

## 2022-07-25 ENCOUNTER — ANESTHESIA (OUTPATIENT)
Dept: OPERATING ROOM | Age: 47
End: 2022-07-25
Payer: COMMERCIAL

## 2022-07-25 VITALS
OXYGEN SATURATION: 94 % | SYSTOLIC BLOOD PRESSURE: 141 MMHG | RESPIRATION RATE: 16 BRPM | WEIGHT: 206 LBS | DIASTOLIC BLOOD PRESSURE: 96 MMHG | TEMPERATURE: 98.6 F | HEIGHT: 70 IN | HEART RATE: 110 BPM | BODY MASS INDEX: 29.49 KG/M2

## 2022-07-25 DIAGNOSIS — S63.054A DISLOCATION OF CARPOMETACARPAL JOINT OF RIGHT HAND, INITIAL ENCOUNTER: Primary | ICD-10-CM

## 2022-07-25 PROCEDURE — 2580000003 HC RX 258: Performed by: NURSE ANESTHETIST, CERTIFIED REGISTERED

## 2022-07-25 PROCEDURE — 7100000001 HC PACU RECOVERY - ADDTL 15 MIN: Performed by: ORTHOPAEDIC SURGERY

## 2022-07-25 PROCEDURE — 6360000002 HC RX W HCPCS: Performed by: ORTHOPAEDIC SURGERY

## 2022-07-25 PROCEDURE — 6360000002 HC RX W HCPCS: Performed by: NURSE ANESTHETIST, CERTIFIED REGISTERED

## 2022-07-25 PROCEDURE — 6370000000 HC RX 637 (ALT 250 FOR IP): Performed by: ORTHOPAEDIC SURGERY

## 2022-07-25 PROCEDURE — 2580000003 HC RX 258: Performed by: ORTHOPAEDIC SURGERY

## 2022-07-25 PROCEDURE — 3700000000 HC ANESTHESIA ATTENDED CARE: Performed by: ORTHOPAEDIC SURGERY

## 2022-07-25 PROCEDURE — 3700000001 HC ADD 15 MINUTES (ANESTHESIA): Performed by: ORTHOPAEDIC SURGERY

## 2022-07-25 PROCEDURE — 2709999900 HC NON-CHARGEABLE SUPPLY: Performed by: ORTHOPAEDIC SURGERY

## 2022-07-25 PROCEDURE — A4217 STERILE WATER/SALINE, 500 ML: HCPCS | Performed by: ORTHOPAEDIC SURGERY

## 2022-07-25 PROCEDURE — 2580000003 HC RX 258: Performed by: ANESTHESIOLOGY

## 2022-07-25 PROCEDURE — 7100000010 HC PHASE II RECOVERY - FIRST 15 MIN: Performed by: ORTHOPAEDIC SURGERY

## 2022-07-25 PROCEDURE — 3600000004 HC SURGERY LEVEL 4 BASE: Performed by: ORTHOPAEDIC SURGERY

## 2022-07-25 PROCEDURE — 2500000003 HC RX 250 WO HCPCS: Performed by: ORTHOPAEDIC SURGERY

## 2022-07-25 PROCEDURE — 6360000002 HC RX W HCPCS

## 2022-07-25 PROCEDURE — 2500000003 HC RX 250 WO HCPCS: Performed by: NURSE ANESTHETIST, CERTIFIED REGISTERED

## 2022-07-25 PROCEDURE — 6360000002 HC RX W HCPCS: Performed by: ANESTHESIOLOGY

## 2022-07-25 PROCEDURE — 3600000014 HC SURGERY LEVEL 4 ADDTL 15MIN: Performed by: ORTHOPAEDIC SURGERY

## 2022-07-25 PROCEDURE — C1713 ANCHOR/SCREW BN/BN,TIS/BN: HCPCS | Performed by: ORTHOPAEDIC SURGERY

## 2022-07-25 PROCEDURE — 7100000000 HC PACU RECOVERY - FIRST 15 MIN: Performed by: ORTHOPAEDIC SURGERY

## 2022-07-25 DEVICE — K WIRE FIX L6IN DIA0.035IN: Type: IMPLANTABLE DEVICE | Site: FINGERS | Status: FUNCTIONAL

## 2022-07-25 DEVICE — IMPLANTABLE DEVICE: Type: IMPLANTABLE DEVICE | Site: HAND | Status: FUNCTIONAL

## 2022-07-25 DEVICE — IMPLANTABLE DEVICE: Type: IMPLANTABLE DEVICE | Site: FINGERS | Status: FUNCTIONAL

## 2022-07-25 RX ORDER — MEPERIDINE HYDROCHLORIDE 25 MG/ML
12.5 INJECTION INTRAMUSCULAR; INTRAVENOUS; SUBCUTANEOUS AS NEEDED
Status: DISCONTINUED | OUTPATIENT
Start: 2022-07-25 | End: 2022-07-25 | Stop reason: HOSPADM

## 2022-07-25 RX ORDER — MAGNESIUM HYDROXIDE 1200 MG/15ML
LIQUID ORAL CONTINUOUS PRN
Status: DISCONTINUED | OUTPATIENT
Start: 2022-07-25 | End: 2022-07-25 | Stop reason: HOSPADM

## 2022-07-25 RX ORDER — LIDOCAINE HYDROCHLORIDE 20 MG/ML
INJECTION, SOLUTION EPIDURAL; INFILTRATION; INTRACAUDAL; PERINEURAL PRN
Status: DISCONTINUED | OUTPATIENT
Start: 2022-07-25 | End: 2022-07-25 | Stop reason: SDUPTHER

## 2022-07-25 RX ORDER — OXYCODONE HYDROCHLORIDE 5 MG/1
10 TABLET ORAL ONCE
Status: COMPLETED | OUTPATIENT
Start: 2022-07-25 | End: 2022-07-25

## 2022-07-25 RX ORDER — OXYCODONE HYDROCHLORIDE 5 MG/1
5 TABLET ORAL ONCE
Status: COMPLETED | OUTPATIENT
Start: 2022-07-25 | End: 2022-07-25

## 2022-07-25 RX ORDER — PROCHLORPERAZINE EDISYLATE 5 MG/ML
5 INJECTION INTRAMUSCULAR; INTRAVENOUS
Status: DISCONTINUED | OUTPATIENT
Start: 2022-07-25 | End: 2022-07-25 | Stop reason: HOSPADM

## 2022-07-25 RX ORDER — MIDAZOLAM HYDROCHLORIDE 1 MG/ML
INJECTION INTRAMUSCULAR; INTRAVENOUS PRN
Status: DISCONTINUED | OUTPATIENT
Start: 2022-07-25 | End: 2022-07-25 | Stop reason: SDUPTHER

## 2022-07-25 RX ORDER — DEXAMETHASONE SODIUM PHOSPHATE 4 MG/ML
INJECTION, SOLUTION INTRA-ARTICULAR; INTRALESIONAL; INTRAMUSCULAR; INTRAVENOUS; SOFT TISSUE PRN
Status: DISCONTINUED | OUTPATIENT
Start: 2022-07-25 | End: 2022-07-25 | Stop reason: SDUPTHER

## 2022-07-25 RX ORDER — SODIUM CHLORIDE 0.9 % (FLUSH) 0.9 %
5-40 SYRINGE (ML) INJECTION EVERY 12 HOURS SCHEDULED
Status: DISCONTINUED | OUTPATIENT
Start: 2022-07-25 | End: 2022-07-25 | Stop reason: HOSPADM

## 2022-07-25 RX ORDER — HYDRALAZINE HYDROCHLORIDE 20 MG/ML
INJECTION INTRAMUSCULAR; INTRAVENOUS
Status: COMPLETED
Start: 2022-07-25 | End: 2022-07-25

## 2022-07-25 RX ORDER — SODIUM CHLORIDE 9 MG/ML
INJECTION, SOLUTION INTRAVENOUS PRN
Status: DISCONTINUED | OUTPATIENT
Start: 2022-07-25 | End: 2022-07-25 | Stop reason: HOSPADM

## 2022-07-25 RX ORDER — FENTANYL CITRATE 50 UG/ML
INJECTION, SOLUTION INTRAMUSCULAR; INTRAVENOUS PRN
Status: DISCONTINUED | OUTPATIENT
Start: 2022-07-25 | End: 2022-07-25 | Stop reason: SDUPTHER

## 2022-07-25 RX ORDER — HYDRALAZINE HYDROCHLORIDE 20 MG/ML
10 INJECTION INTRAMUSCULAR; INTRAVENOUS
Status: DISCONTINUED | OUTPATIENT
Start: 2022-07-25 | End: 2022-07-25 | Stop reason: HOSPADM

## 2022-07-25 RX ORDER — KETAMINE HCL IN NACL, ISO-OSM 20 MG/2 ML
SYRINGE (ML) INJECTION PRN
Status: DISCONTINUED | OUTPATIENT
Start: 2022-07-25 | End: 2022-07-25 | Stop reason: SDUPTHER

## 2022-07-25 RX ORDER — PROPOFOL 10 MG/ML
INJECTION, EMULSION INTRAVENOUS PRN
Status: DISCONTINUED | OUTPATIENT
Start: 2022-07-25 | End: 2022-07-25 | Stop reason: SDUPTHER

## 2022-07-25 RX ORDER — OXYCODONE HYDROCHLORIDE AND ACETAMINOPHEN 5; 325 MG/1; MG/1
1 TABLET ORAL EVERY 6 HOURS PRN
Qty: 20 TABLET | Refills: 0 | Status: SHIPPED | OUTPATIENT
Start: 2022-07-25 | End: 2022-07-30

## 2022-07-25 RX ORDER — SODIUM CHLORIDE 0.9 % (FLUSH) 0.9 %
5-40 SYRINGE (ML) INJECTION PRN
Status: DISCONTINUED | OUTPATIENT
Start: 2022-07-25 | End: 2022-07-25 | Stop reason: HOSPADM

## 2022-07-25 RX ORDER — ONDANSETRON 2 MG/ML
INJECTION INTRAMUSCULAR; INTRAVENOUS PRN
Status: DISCONTINUED | OUTPATIENT
Start: 2022-07-25 | End: 2022-07-25 | Stop reason: SDUPTHER

## 2022-07-25 RX ORDER — HYDROMORPHONE HCL 110MG/55ML
PATIENT CONTROLLED ANALGESIA SYRINGE INTRAVENOUS PRN
Status: DISCONTINUED | OUTPATIENT
Start: 2022-07-25 | End: 2022-07-25 | Stop reason: SDUPTHER

## 2022-07-25 RX ORDER — DIPHENHYDRAMINE HYDROCHLORIDE 50 MG/ML
12.5 INJECTION INTRAMUSCULAR; INTRAVENOUS
Status: DISCONTINUED | OUTPATIENT
Start: 2022-07-25 | End: 2022-07-25 | Stop reason: HOSPADM

## 2022-07-25 RX ORDER — SODIUM CHLORIDE, SODIUM LACTATE, POTASSIUM CHLORIDE, CALCIUM CHLORIDE 600; 310; 30; 20 MG/100ML; MG/100ML; MG/100ML; MG/100ML
INJECTION, SOLUTION INTRAVENOUS CONTINUOUS PRN
Status: DISCONTINUED | OUTPATIENT
Start: 2022-07-25 | End: 2022-07-25 | Stop reason: SDUPTHER

## 2022-07-25 RX ORDER — KETOROLAC TROMETHAMINE 30 MG/ML
30 INJECTION, SOLUTION INTRAMUSCULAR; INTRAVENOUS ONCE
Status: COMPLETED | OUTPATIENT
Start: 2022-07-25 | End: 2022-07-25

## 2022-07-25 RX ORDER — SODIUM CHLORIDE, SODIUM LACTATE, POTASSIUM CHLORIDE, CALCIUM CHLORIDE 600; 310; 30; 20 MG/100ML; MG/100ML; MG/100ML; MG/100ML
INJECTION, SOLUTION INTRAVENOUS CONTINUOUS
Status: DISCONTINUED | OUTPATIENT
Start: 2022-07-25 | End: 2022-07-25 | Stop reason: HOSPADM

## 2022-07-25 RX ORDER — ONDANSETRON 2 MG/ML
4 INJECTION INTRAMUSCULAR; INTRAVENOUS
Status: DISCONTINUED | OUTPATIENT
Start: 2022-07-25 | End: 2022-07-25 | Stop reason: HOSPADM

## 2022-07-25 RX ADMIN — HYDRALAZINE HYDROCHLORIDE 10 MG: 20 INJECTION INTRAMUSCULAR; INTRAVENOUS at 18:00

## 2022-07-25 RX ADMIN — HYDROMORPHONE HYDROCHLORIDE 0.5 MG: 1 INJECTION, SOLUTION INTRAMUSCULAR; INTRAVENOUS; SUBCUTANEOUS at 18:20

## 2022-07-25 RX ADMIN — HYDROMORPHONE HYDROCHLORIDE 2 MG: 2 INJECTION, SOLUTION INTRAMUSCULAR; INTRAVENOUS; SUBCUTANEOUS at 16:44

## 2022-07-25 RX ADMIN — HYDROMORPHONE HYDROCHLORIDE 0.5 MG: 1 INJECTION, SOLUTION INTRAMUSCULAR; INTRAVENOUS; SUBCUTANEOUS at 19:00

## 2022-07-25 RX ADMIN — HYDROMORPHONE HYDROCHLORIDE 0.5 MG: 1 INJECTION, SOLUTION INTRAMUSCULAR; INTRAVENOUS; SUBCUTANEOUS at 18:30

## 2022-07-25 RX ADMIN — CEFAZOLIN 2000 MG: 2 INJECTION, POWDER, FOR SOLUTION INTRAMUSCULAR; INTRAVENOUS at 15:44

## 2022-07-25 RX ADMIN — SODIUM CHLORIDE, POTASSIUM CHLORIDE, SODIUM LACTATE AND CALCIUM CHLORIDE: 600; 310; 30; 20 INJECTION, SOLUTION INTRAVENOUS at 14:03

## 2022-07-25 RX ADMIN — FENTANYL CITRATE 100 MCG: 50 INJECTION, SOLUTION INTRAMUSCULAR; INTRAVENOUS at 15:46

## 2022-07-25 RX ADMIN — OXYCODONE 10 MG: 5 TABLET ORAL at 19:30

## 2022-07-25 RX ADMIN — LIDOCAINE HYDROCHLORIDE 100 MG: 20 INJECTION, SOLUTION EPIDURAL; INFILTRATION; INTRACAUDAL; PERINEURAL at 15:46

## 2022-07-25 RX ADMIN — Medication 20 MG: at 16:02

## 2022-07-25 RX ADMIN — ONDANSETRON 4 MG: 2 INJECTION INTRAMUSCULAR; INTRAVENOUS at 15:55

## 2022-07-25 RX ADMIN — MIDAZOLAM HYDROCHLORIDE 2 MG: 2 INJECTION, SOLUTION INTRAMUSCULAR; INTRAVENOUS at 15:42

## 2022-07-25 RX ADMIN — PROPOFOL 200 MG: 10 INJECTION, EMULSION INTRAVENOUS at 15:47

## 2022-07-25 RX ADMIN — HYDROMORPHONE HYDROCHLORIDE 0.5 MG: 1 INJECTION, SOLUTION INTRAMUSCULAR; INTRAVENOUS; SUBCUTANEOUS at 19:15

## 2022-07-25 RX ADMIN — KETOROLAC TROMETHAMINE 30 MG: 30 INJECTION, SOLUTION INTRAMUSCULAR; INTRAVENOUS at 18:45

## 2022-07-25 RX ADMIN — SODIUM CHLORIDE, SODIUM LACTATE, POTASSIUM CHLORIDE, AND CALCIUM CHLORIDE: .6; .31; .03; .02 INJECTION, SOLUTION INTRAVENOUS at 15:42

## 2022-07-25 RX ADMIN — DEXAMETHASONE SODIUM PHOSPHATE 8 MG: 4 INJECTION, SOLUTION INTRAMUSCULAR; INTRAVENOUS at 15:55

## 2022-07-25 ASSESSMENT — PAIN DESCRIPTION - PAIN TYPE
TYPE: SURGICAL PAIN

## 2022-07-25 ASSESSMENT — PAIN DESCRIPTION - FREQUENCY
FREQUENCY: CONTINUOUS

## 2022-07-25 ASSESSMENT — PAIN - FUNCTIONAL ASSESSMENT
PAIN_FUNCTIONAL_ASSESSMENT: 0-10
PAIN_FUNCTIONAL_ASSESSMENT: PREVENTS OR INTERFERES WITH ALL ACTIVE AND SOME PASSIVE ACTIVITIES
PAIN_FUNCTIONAL_ASSESSMENT: PREVENTS OR INTERFERES SOME ACTIVE ACTIVITIES AND ADLS
PAIN_FUNCTIONAL_ASSESSMENT: PREVENTS OR INTERFERES SOME ACTIVE ACTIVITIES AND ADLS
PAIN_FUNCTIONAL_ASSESSMENT: PREVENTS OR INTERFERES WITH ALL ACTIVE AND SOME PASSIVE ACTIVITIES

## 2022-07-25 ASSESSMENT — PAIN SCALES - GENERAL
PAINLEVEL_OUTOF10: 9
PAINLEVEL_OUTOF10: 7
PAINLEVEL_OUTOF10: 9
PAINLEVEL_OUTOF10: 9
PAINLEVEL_OUTOF10: 7
PAINLEVEL_OUTOF10: 9

## 2022-07-25 ASSESSMENT — PAIN DESCRIPTION - LOCATION
LOCATION: HAND

## 2022-07-25 ASSESSMENT — PAIN DESCRIPTION - ORIENTATION
ORIENTATION: LEFT

## 2022-07-25 ASSESSMENT — PAIN DESCRIPTION - ONSET
ONSET: ON-GOING

## 2022-07-25 ASSESSMENT — LIFESTYLE VARIABLES: SMOKING_STATUS: 0

## 2022-07-25 ASSESSMENT — PAIN DESCRIPTION - DESCRIPTORS
DESCRIPTORS: ACHING;PRESSURE;SHARP;SHOOTING
DESCRIPTORS: ACHING;BURNING;GNAWING
DESCRIPTORS: THROBBING;SHOOTING;SHARP

## 2022-07-25 NOTE — PROGRESS NOTES
OPEN REDUCTION AND INTERNAL FIXATION OF SMALL FINGER CARPOMETACARPAL FRACTURE DISLOCATION   Dr Sierra     Current Allergies: Patient has no known allergies. No results for input(s): POCGLU in the last 72 hours. Admitted to PACU bed 11 from OR. Arrived on a stretcher  . Attached to PACU monitoring system. Alarms and parameters set. Report received from anesthesia personnel [address] staff did not report skin issues that were observed while in OR  No problems reported intraoperatively. Pt arrived with oxygen per nasal cannula with oxygen at 4 liters. Oral airway in place    Ace wrap with sling in place  Ice applied    Doctors aware of all labs before coming to recovery.

## 2022-07-25 NOTE — DISCHARGE INSTRUCTIONS
Post op Instructions for Hand and Upper Extremity Surgery    * Keep dressing dry and clean. It is ok to Shower just keep Dressing dry. * Elevate operative arm. * Ice 20 minutes on 20 minutes off. * Follow-up appointment as scheduled by office staff. Check paperwork from office. If no appointment scheduled call the office. * If dressing is too tight, you may loosen Ace bandage and leave splint in place. * Sling, as needed  * If you have any questions, refer to the office number listed below. [phone] ([facility])     [address] [place] [address]    There are potential side effects of anesthesia or sedation you may experience for the first 24 hours. These side effects include:    Confusion or Memory loss, Dizziness, or Delayed Reaction Times   [x]A responsible person should be with you for the next 24 hours. Do not operate any vehicles (automobiles, bicycles, motorcycles) or power tools or machinery for 24 hours. Do not sign any legal documents or make any legal decisions for 24 hours. Do not drink alcohol for 24 hours or while taking narcotic pain medication. Nausea    [x]Start with light diet and progress to your normal diet as you feel like eating. However, if you experience nausea or repeated episodes of vomiting which persist beyond 12-24 hours, notify your physician. Once nausea has passed, remember to keep drinking fluids. Difficulty Passing Urine  [x]Drink extra amounts of fluid today. Notify your physician if you have not urinated within 8 hours after your procedure or you feel uncomfortable. Irritated Throat from a Breathing Tube  [x]Drink extra amounts of fluid today. Lozenges may help. Muscle Aches  [x]You may experience some generalized body aches as your muscles recover from medications used to relax them during surgery. These will gradually subside.     MEDICATION INSTRUCTIONS:  [x]Prescription(S) x  1   sent urinate      Numb, pale, blue, cold or tingling extremity      Physician:  Dr Anastasia Briones    The above instructions were reviewed with patient/significant other. The following additional patient specific information was reviewed with the patient/significant other:  [x]Procedure/physician specific instructions  []Medication information sheet(S) including potential side effects  []Kianas egress test  []Pain Ball management  []FAQ Catheter associated blood stream infections  [x]FAQ Surgical Site Infections  []Other-    I have read and understand the instructions given to me: ____________________________________________   (Patient/S.O. Signature)            Date/time 7/25/2022 6:06 PM         PACU:  [phone]   M-F 700 AM - 7 PM      SAME DAY SERVICES:  [phone] M-F 7AM-6PM        If you smoke STOP. We care about your health! FAQs  (frequently asked questions)  About Surgical Site Infections     What is a Surgical Site Infection (SSI)? A surgical site infection is an infection that occurs after surgery in the part of the body   where the surgery took place. Most patients who have surgery do not develop an   infection. However, infections develop in about 1 to 3 out of every 100 patients who  have surgery. Some common symptoms of a surgical site infection are:   Redness and pain around the area where you had surgery  Drainage of cloudy fluid from your surgical wound   Fever     Can SSIs be treated? Yes. Most surgical site infections can be treated with antibiotics. The antibiotic given to  you depends on the bacteria (germs) causing the infection. Sometimes patients with  SSIs also need another surgery to treat the infection. What are some of the things that hospitals are doing to prevent SSIs? To prevent SSIs, doctors, nurses and other healthcare providers:   Clean their hands and arms up to their elbows with an antiseptic agent just before the surgery.    Clean their hands with soap and water or an alcohol-based hand rub before and after caring for each patient. May remove some of your hair immediately before your surgery using electric clippers if the hair is in the same area where the procedure will occur. They should not shave you with a razor. Wear special hair covers, masks, gowns, and gloves during surgery to keep the surgery area clean. Give you antibiotics before your surgery starts. In most cases, you should get antibiotics within 60 minutes before the surgery starts and the antibiotics should be stopped within 24 hours after surgery. Clean the skin at the site of your surgery with a special soap that kills germs. What can I do to help prevent SSIs? Before your surgery:  Tell your doctor about other medical problems you may have. Health problems such as allergies, diabetes, and obesity could affect your surgery and your treatment. Quit smoking. Patients who smoke get more infections. Talk to your doctor about how you can quit before your surgery. Do not shave near where you will have surgery. Shaving with a razor can irritate your skin and make it easier to develop an infection. At the time of your surgery:  Speak up if someone tries to shave you with a razor before surgery. Ask why you need to be shaved and talk with your surgeon if you have any concerns. Ask if you will get antibiotics before surgery. After your surgery:  Make sure that your healthcare providers clean their hands before examining you, either with soap and water or an alcohol-based hand rub. IF YOU DO NOT SEE YOUR PROVIDERS CLEAN THEIR HANDS, PLEASE ASK THEM TO DO SO. Family and friends who visit you should not touch the surgical wound or dressings. Family and friends should clean their hands with soap and water or an alcohol-based hand rub before and after visiting you. If you do not see them clean their hands, ask them to clean their hands.       What do I need to do when I go home from the hospital?  Before you go home, your doctor nurses should explain everything you need to know about taking care of your wound. Make sure you understand how to care for your wound before you leave the hospital.    Always clean your hands before and after caring for your wound. Before you go home, make sure you know who to contact if you have questions or problems after you get home. If you have any symptoms of an infection, such as redness and pain at the surgery site, drainage, or fever, call your doctor immediately. If you have additional questions, please ask your doctor or nurse.

## 2022-07-25 NOTE — BRIEF OP NOTE
Brief Postoperative Note      Patient: Tosha Richmond  YOB: 1975  MRN: [id number]    Date of Procedure: 7/25/2022    Pre-Op Diagnosis: 1. Left small finger carpometacarpal joint fracture dislocation, closed  2.dog bite wound left index finger and left forearm     Post-Op Diagnosis: Same       Procedure(s):  OPEN REDUCTION AND INTERNAL FIXATION OF SMALL FINGER CARPOMETACARPAL FRACTURE DISLOCATION, complex  2. Interpretation of fluoroscopy 3 views left hand  3.  Exploration of forearm and index finger wounds totaling 4cm with irrigation and debridement and closure    Surgeon(s):  Aylin Vickers MD    Assistant:  Surgical Assistant: Adrian Huertas    Anesthesia: General, local     Estimated Blood Loss (mL): 92FW    Complications: None immediate apparent    Specimens:   none    Implants:  Synthes 1.3mm nonlocking screws x 2  0.045 K wire x 2  0.062 K wire x 1      Drains: none    Findings: see fully dictated operative report     Electronically signed by Jessica Peña MD on 7/25/2022 at 6:01 PM Previously Negative (within the last year)

## 2022-07-25 NOTE — ANESTHESIA PRE PROCEDURE
Department of Anesthesiology  Preprocedure Note       Name:  Cliff Kwan   Age:  40 y.o.  :  1975                                          MRN:  [id number]         Date:  2022      Surgeon: Casi Smith):  Pavel Armstrong MD    Procedure: Procedure(s):  CLOSED VERSUS OPEN REDUCTION, INTERNAL VERSUS PERCUTANEOUS PIN FIXATION OF LEFT HAND SMALL FINGER CARPOMETACARPAL JOINT; ANY INDICATED PROCEDURES    Medications prior to admission:   Prior to Admission medications    Medication Sig Start Date End Date Taking? Authorizing Provider   HYDROcodone-acetaminophen (NORCO) 5-325 MG per tablet Take 1 tablet by mouth every 6 hours as needed for Pain. Yes Historical Provider, MD   amoxicillin-clavulanate (AUGMENTIN) 875-125 MG per tablet Take 1 tablet by mouth in the morning and 1 tablet before bedtime. Do all this for 10 days.  22  Lara Baez PA-C   lidocaine (LIDODERM) 5 % Place 1 patch onto the skin as needed 7/3/22   Historical Provider, MD   ibuprofen (ADVIL;MOTRIN) 800 MG tablet Take 800 mg by mouth every 8 hours as needed 7/3/22   Historical Provider, MD   amLODIPine (NORVASC) 5 MG tablet Take 1 tablet by mouth daily  Patient not taking: No sig reported 22   Hodan Feldman MD   omeprazole (PRILOSEC) 40 MG delayed release capsule Take 1 capsule by mouth every morning (before breakfast) 22   Romy Phillip MD       Current medications:    Current Facility-Administered Medications   Medication Dose Route Frequency Provider Last Rate Last Admin    ceFAZolin (ANCEF) 2,000 mg in sodium chloride 0.9 % 50 mL IVPB (mini-bag)  2,000 mg IntraVENous Once Pavel Armstrong MD        lactated ringers infusion   IntraVENous Continuous Mary Vitor,  mL/hr at 22 1403 New Bag at 22 1403    sodium chloride flush 0.9 % injection 5-40 mL  5-40 mL IntraVENous 2 times per day Mary Vitor, DO        sodium chloride flush 0.9 % injection 5-40 mL  5-40 mL IntraVENous PRN 06/28/2022 01:19 AM    HCT 42.2 06/28/2022 01:19 AM    MCV 88.9 06/28/2022 01:19 AM    RDW 13.1 06/28/2022 01:19 AM     06/28/2022 01:19 AM       CMP:   Lab Results   Component Value Date/Time     06/28/2022 01:19 AM    K 3.8 06/28/2022 01:19 AM     06/28/2022 01:19 AM    CO2 24 06/28/2022 01:19 AM    BUN 14 06/28/2022 01:19 AM    CREATININE 0.8 06/28/2022 01:19 AM    GFRAA >60 06/28/2022 01:19 AM    GFRAA >60 03/12/2013 02:20 PM    AGRATIO 1.6 06/28/2022 01:19 AM    LABGLOM >60 06/28/2022 01:19 AM    GLUCOSE 167 06/28/2022 01:19 AM    PROT 7.4 06/28/2022 01:19 AM    PROT 7.8 03/12/2013 02:20 PM    CALCIUM 9.1 06/28/2022 01:19 AM    BILITOT 0.5 06/28/2022 01:19 AM    ALKPHOS 46 06/28/2022 01:19 AM    AST 38 06/28/2022 01:19 AM    ALT 46 06/28/2022 01:19 AM       POC Tests: No results for input(s): POCGLU, POCNA, POCK, POCCL, POCBUN, POCHEMO, POCHCT in the last 72 hours.     Coags:   Lab Results   Component Value Date/Time    PROTIME 12.1 03/02/2022 10:40 AM    INR 1.07 03/02/2022 10:40 AM       HCG (If Applicable): No results found for: PREGTESTUR, PREGSERUM, HCG, HCGQUANT     ABGs: No results found for: PHART, PO2ART, ZPA9EVV, KMZ6OQU, BEART, B9OKTTZP     Type & Screen (If Applicable):  Lab Results   Component Value Date    LABABO A 03/12/2013    [address] Positive 03/12/2013       Drug/Infectious Status (If Applicable):  No results found for: HIV, HEPCAB    COVID-19 Screening (If Applicable): No results found for: COVID19        Anesthesia Evaluation  Patient summary reviewed and Nursing notes reviewed no history of anesthetic complications:   Airway: Mallampati: II  TM distance: >3 FB   Neck ROM: full  Mouth opening: > = 3 FB   Dental:    (+) upper dentures and lower dentures      Pulmonary: breath sounds clear to auscultation  (+) asthma:     (-) not a current smoker ( smokes marijuana  yesterday )                           Cardiovascular:  Exercise tolerance: good (>4 METS),   (+) hypertension: moderate,     (-) past MI    NYHA Classification: I    Rhythm: regular  Rate: normal           Beta Blocker:  Not on Beta Blocker         Neuro/Psych:   Negative Neuro/Psych ROS              GI/Hepatic/Renal:        (-) GERD       Endo/Other: Negative Endo/Other ROS                     ROS comment: Dog bite pit bull    Last Tuesday    On atbx    Abdominal:             Vascular: negative vascular ROS. Other Findings:           Anesthesia Plan      general     ASA 2       Induction: intravenous. MIPS: Prophylactic antiemetics administered. Anesthetic plan and risks discussed with patient and spouse. Plan discussed with CRNA.     Attending anesthesiologist reviewed and agrees with Preprocedure content                Inge Lam DO   7/25/2022

## 2022-07-25 NOTE — PROGRESS NOTES
All discharge instructions given to patient's wife including prescription for percocet at [address]. Wife informed that patient received a percocet in PACU at [address] and to follow prescription frequency from that time. Digital thermometer along with an aqua sleeve also given to wife. Wife aware that her 's surgical dressing must remain completely dry at all times and the aqua sleeve is given to help with that during showering. No further questions.

## 2022-07-25 NOTE — H&P
I have reviewed the history and physical and examined the patient and find no relevant changes. I have reviewed with the patient and/or family the risks, benefits, and alternatives to the procedure.     Kisha Garza MD  7/25/2022

## 2022-07-25 NOTE — H&P
Reynold Baumgarten    [id number]    [facility] Same Day Surgery Update H & P  Department of General Surgery   Surgical Service   Pre-operative History and Physical  Last H & P within the last 30 days. DIAGNOSIS:   Open bite of left hand, foreign body presence unspecified, initial encounter Darcy Curry  Dog bite of left hand, initial encounter Chana Wheeler. [id number]]    Procedure(s):  CLOSED VERSUS OPEN REDUCTION, INTERNAL VERSUS PERCUTANEOUS PIN FIXATION OF LEFT HAND SMALL FINGER CARPOMETACARPAL JOINT; ANY INDICATED PROCEDURES    History obtained from: Patient interview and EHR      HISTORY OF PRESENT ILLNESS:   The patient is a 40 y.o. male with left hand 5th carpometacarpal joint injury after a dog bite presents today for the above procedure. Illness Screening: Patient denies fever, chills, worsening cough, or close contact with sick individuals. Past Medical History:        Diagnosis Date    Asthma     as child    Hepatitis C antibody positive in blood 08/23/2021    Wears dentures     FULL UPPER PLATE / PARTIAL LOWER PLATE     Past Surgical History:        Procedure Laterality Date    ANKLE SURGERY      HAND SURGERY         Medications Prior to Admission:      Prior to Admission medications    Medication Sig Start Date End Date Taking? Authorizing Provider   HYDROcodone-acetaminophen (NORCO) 5-325 MG per tablet Take 1 tablet by mouth every 6 hours as needed for Pain. Yes Historical Provider, MD   amoxicillin-clavulanate (AUGMENTIN) 875-125 MG per tablet Take 1 tablet by mouth in the morning and 1 tablet before bedtime. Do all this for 10 days.  7/19/22 7/29/22  Lara Baez PA-C   lidocaine (LIDODERM) 5 % Place 1 patch onto the skin as needed 7/3/22   Historical Provider, MD   ibuprofen (ADVIL;MOTRIN) 800 MG tablet Take 800 mg by mouth every 8 hours as needed 7/3/22   Historical Provider, MD   amLODIPine (NORVASC) 5 MG tablet Take 1 tablet by mouth daily  Patient not taking: No sig reported 6/27/22 Ye Hebert MD   omeprazole (PRILOSEC) 40 MG delayed release capsule Take 1 capsule by mouth every morning (before breakfast) 6/8/22   Josephine Montoya MD         Allergies:  Patient has no known allergies. PHYSICAL EXAM:      BP (!) 149/106   Pulse 89   Temp 98 °F (36.7 °C) (Temporal)   Resp 16   Ht 5' 10\" (1.778 m)   Wt 206 lb (93.4 kg)   SpO2 96%   BMI 29.56 kg/m²      Airway:  Airway patent with no audible stridor    Heart:  Regular rate and rhythm, No murmur noted    Lungs:  No increased work of breathing, good air exchange, clear to auscultation bilaterally, no crackles or wheezing    Abdomen:  Soft, non-distended, non-tender, no rebound tenderness or guarding, and no masses palpated    ASSESSMENT AND PLAN     Patient is a 40 y.o. male with above specified procedure planned. 1. Patient seen and focused exam done today- no new changes since last physical exam on 7/23/22    2. Access to ancillary services are available per request of the provider.     ELBA Estrada - CNP     7/25/2022

## 2022-07-26 NOTE — OP NOTE
[place] [address], [address]                                OPERATIVE REPORT    PATIENT NAME: Kirk Ashby                  :        1975  MED REC NO:   [id number]                          ROOM:  ACCOUNT NO:   [de-identified]                           ADMIT DATE: 2022  PROVIDER:     Burnette Frankel, MD    DATE OF PROCEDURE:  2022    LOCATION:  [facility] outpatient procedure. PREOPERATIVE DIAGNOSES:  1. Left small finger carpometacarpal joint fracture dislocation,  closed. 2.  Dog bite wounds, left index finger and left forearm. POSTOPERATIVE DIAGNOSES:  1. Left small finger carpometacarpal joint fracture dislocation,  closed. 2.  Dog bite wounds, left index finger and left forearm. OPERATIONS AND PROCEDURES PERFORMED:  1. Open reduction and internal fixation of small finger carpometacarpal  fracture dislocation complex. 2.  Interpretation of fluoroscopy, 3 views of left hand. 3.  Exploration of forearm and index finger wounds totaling 4 cm with  irrigation, debridement of skin, subcutaneous tissue with loose primary  closure. PRIMARY SURGEON:  Burnette Frankel, MD    ASSISTANT:  [facility] surgical assistant. ANESTHESIA:  General plus local 1% lidocaine, 0.25% Marcaine. ESTIMATED BLOOD LOSS:  10 mL. COMPLICATIONS:  None immediately apparent. SPECIMENS:  None. IMPLANTS:  1. Synthes 1.3 mm nonlocking screws x2.  2.  0.045 K-wire x2.  3.  0.062 K-wire x1. DRAINS:  None. BRIEF HISTORY:  The patient is a 42-year-old male who was breaking up a  dog fight when he sustained injury to the left hand. He was noted to  have several dog bite wounds and lacerations to the dorsal left index  finger as well as distal dorsal forearm which were irrigated at the  bedside and loosely closed the forearm wound in the emergency  department. He was also having a separate left hand pain and swelling. Images obtained demonstrated a closed small finger fracture dislocation. CT scan confirming acuity of the injury. I had a discussion with the  patient regarding options for treatment and for specifically the small  finger fracture dislocation, recommended surgical stabilization with  either closed versus open reduction, possible pin fixation or internal  fixation of the injury. We discussed the risks, benefits, alternatives  of the procedure with risks including but not limited to infection;  bleeding; damage to tendon, nerve, and blood vessels; need for  additional procedures; continued pain; nonunion; malunion; stiffness;  development of arthritis as well as risks of anesthesia. We also  discussed exploring his forearm and index finger wounds at the same time  of surgery with repair of any structures as needed and debridement,  irrigation, and closure of the wounds loosely. The fracture dislocation  was a separate injury that did not have any open wounds associated with  it. He is understanding and elected to proceed. Consent was obtained  prior to surgery. DESCRIPTION OF PROCEDURE:  The patient was seen in the preoperative  holding room by the operating surgeon. The operative site, the left  hand marked by the operating surgeon. Anesthesia also evaluated the  patient, felt the patient appropriate for general anesthesia. The  patient was taken back to the operating room and placed in the supine  position on a regular table. All bony prominences were well padded. Weight-based dose of IV antibiotics was administered within one hour  prior to the procedure. We began the procedure by using an Esmarch  bandage exsanguinating the limb and elevating the tourniquet to 250  mmHg. We first focused on the small finger fracture dislocation. There  was obvious evidence of dorsal subluxation and hamate fracture fragments  noted.   We attempted closed reduction, several attempts with traction  and manipulation, but there was persistent subluxation and dislocation  of the small finger metacarpal.  At this point, I felt that an open  reduction to be most appropriate. Therefore, a dorsal incision was made  at the base of the hand overlying the small finger CMC joint through  skin only with a 15 blade. We carefully dissected down through  subcutaneous tissues. Dorsal sensory nerve branches were preserved and  retracted gently. The fascia was then incised and also the extensor  mechanisms were carefully retracted. The capsule of the small finger  CMC joint was identified and then, a longitudinal capsulotomy was  performed. This allowed us to evaluate the [facility] joint. There was clear  dislocation of the small finger metacarpal dorsally and evidence of  fracture fragments. Two larger fragments of the hamate were noted on  the dorsal lip of the [facility] joint. The joint was evaluated. There was no evidence of additional chondral  injury other than the dorsal hamate fracture. We explored the remainder  of the wound. No evidence of additional injury. The joint was cleaned  of clot and hematoma and this area was then irrigated. We then  performed a reduction by pulling traction and dorsal pressure was  applied to the small finger metacarpal reducing the joint. We placed  0.045 K-wire from the metadiaphyseal region of the small finger  metacarpal across the [facility] joint and into the hamate. This allowed for  stability of the [facility] joint. Further stability was then achieved by  placing an 0.062 K-wire transmetacarpal from the small finger metacarpal  across bicortically and then into the ring finger metacarpal  bicortically as well. Images were obtained demonstrating appropriate  placement of each of these K-wires. A third K-wire, 0.045 K-wire, was  placed in the same fashion from the metadiaphyseal region of the small  finger metacarpal across the joint and into the hamate as well.   Again,  the length of each of the K-wires was scrutinized and noted to be within  the hamate and also, it appeared to have the appropriate trajectory  achieving stability. The hamate fragments were analyzed and I felt that  they would be large enough to attempt an internal fixation to try to  achieve additional stability as well as capsular closure. We  provisionally placed two 0.028 K-wires in each of these fragments and  then sequentially drilled using the Synthes system. 1.0 mm drill was  used to predrill bicortically and then measured measuring 1.3 mm Synthes  nonlocking screws. Two of these screws were placed one in each of the  fragments which provided a buttress and also stability of these  fragments. This appeared to achieve further stability of the fracture  dislocation. Again, these screws were scrutinized as well under mini  C-arm fluoroscopy to determine and confirm that there appeared to be  appropriate length and trajectory of the screws. No intraarticular  screw placement was noted. At this point, the length, alignment, and  rotation appeared to be restored and the joint appeared to be stable. There was no evidence clinically of malrotation or angulation of the  finger. The pins were bent and cut outside of the skin. At this point, the wound was irrigated again and then, capsular closure  was performed with 3-0 Vicryl suture and a nice tight capsular closure  was noted. The skin was then closed with interrupted 4-0 nylon suture. The tourniquet had been deflated and hemostasis was achieved at this  point. The next portion of the procedure was to explore the dog bite wounds  which were separate from the fracture dislocation. There was a dorsal  transverse wound that was irregular along the distal forearm. It was  extended both proximally and distally. The suture had been removed.    The wound was explored and debridement of the skin, subcutaneous tissue  was performed with excisional debridement with tenotomy scissors of any  nonviable tissue underneath. There was evidence of intact extensor  retinaculum. No obvious rents or penetration. No obvious injury to the  extensor mechanism or again the retinaculum. There was possibly an  injury to the branch of the superficial radial nerve; however, based on  the mechanism injury and the small nature of this branch as well as the  mechanism, I did not feel that repair would be optimum. Therefore, this  wound was copiously irrigated with normal saline irrigation and loosely  closed with interrupted 4-0 nylon suture. We then explored the curvilinear incision over the dorsal aspect of the  index finger. Similarly, this was extended proximally and distally and  similarly, this was debrided with excisional debridement using tenotomy  scissors. The skin and subcutaneous tissue also underlying this wound,  there appeared to be intact extensor mechanism. No rents or traumatic  arthrotomies were noted at the PIP joint with no deep injury noted. Therefore, this flap appeared to be viable and healthy. The skin was  also closed loosely with interrupted 4-0 nylon suture. Total area of  closure was approximately 4 cm. At this point, all fingers were pink  and warm, well perfused. Again, no evidence of clinical malrotation or  angulation. Compartments were soft and compressible throughout the  forearm and hand. Sterile dressing and sterile overwrap were then  applied. The pins had been dressed as well and an overwrap with ulnar  gutter splint was then applied. The patient tolerated the procedure  well, was awoken, taken to the PACU in stable condition. ADDENDUM:  Please note that 3 views of the left hand were obtained using  intraoperative fluoroscopy demonstrating appropriate pin trajectory and  placement and appropriate alignment of the small finger CMC fracture  dislocation. Images were saved and printed, interpreted by me.     POSTOPERATIVE PLAN:  The patient will remain in the splint and dressing,  keep clean, dry, and intact. He will complete his course of oral  antibiotics as per previously prescribed. Plan for followup in clinic  in approximately 7 to 10 days for wound check and possible suture  removal at which time we will refer to Hand Therapy for custom ulnar  gutter orthosis, forearm based. Anticipate keeping pins in place for a  total of 6 weeks.         Jose Houston MD    D: 07/25/2022 18:15:33       T: 07/25/2022 18:18:51     [email or web address]  Job#: [id number]     Doc#: [id number]    CC:

## 2022-07-27 NOTE — ANESTHESIA POSTPROCEDURE EVALUATION
Department of Anesthesiology  Postprocedure Note    Patient: Billy Erickson  MRN: [id number]  YOB: 1975  Date of evaluation: 7/27/2022      Procedure Summary     Date: 07/25/22 Room / Location: [facility] [address]  / [facility]    Anesthesia Start: 6973 Anesthesia Stop: 4259    Procedure: OPEN REDUCTION AND INTERNAL FIXATION OF SMALL FINGER CARPOMETACARPAL FRACTURE DISLOCATION (Left: Hand) Diagnosis:       Open bite of left hand, foreign body presence unspecified, initial encounter      Dog bite of left hand, initial encounter      (Open bite of left hand, foreign body presence unspecified, initial encounter Lorenzana Reginomons)      (Dog bite of left hand, initial encounter Sal Range. 0XXA])    Surgeons: Glen Magallanes MD Responsible Provider: Pedrito Quiroz DO    Anesthesia Type: general ASA Status: 2          Anesthesia Type: No value filed.     Audi Phase I: Audi Score: 10    Audi Phase II:        Anesthesia Post Evaluation    Patient location during evaluation: PACU  Patient participation: complete - patient participated  Level of consciousness: awake and alert  Pain score: 5  Airway patency: patent  Nausea & Vomiting: no nausea and no vomiting  Complications: no  Cardiovascular status: hemodynamically stable  Respiratory status: acceptable  Hydration status: stable

## 2022-10-27 NOTE — PROGRESS NOTES

## 2022-11-01 ENCOUNTER — ANESTHESIA EVENT (OUTPATIENT)
Dept: ENDOSCOPY | Age: 47
End: 2022-11-01
Payer: COMMERCIAL

## 2022-11-01 ENCOUNTER — ANESTHESIA (OUTPATIENT)
Dept: ENDOSCOPY | Age: 47
End: 2022-11-01
Payer: COMMERCIAL

## 2022-11-01 ENCOUNTER — HOSPITAL ENCOUNTER (OUTPATIENT)
Age: 47
Setting detail: OUTPATIENT SURGERY
Discharge: HOME | End: 2022-11-01
Attending: INTERNAL MEDICINE | Admitting: INTERNAL MEDICINE
Payer: COMMERCIAL

## 2022-11-01 VITALS
TEMPERATURE: 97.9 F | BODY MASS INDEX: 27.92 KG/M2 | OXYGEN SATURATION: 94 % | SYSTOLIC BLOOD PRESSURE: 122 MMHG | WEIGHT: 195 LBS | HEART RATE: 72 BPM | RESPIRATION RATE: 16 BRPM | HEIGHT: 70 IN | DIASTOLIC BLOOD PRESSURE: 91 MMHG

## 2022-11-01 DIAGNOSIS — Z83.71 FH: COLON POLYPS: ICD-10-CM

## 2022-11-01 DIAGNOSIS — K21.9 GASTROESOPHAGEAL REFLUX DISEASE, UNSPECIFIED WHETHER ESOPHAGITIS PRESENT: ICD-10-CM

## 2022-11-01 PROCEDURE — 2709999900 HC NON-CHARGEABLE SUPPLY: Performed by: INTERNAL MEDICINE

## 2022-11-01 PROCEDURE — 2580000003 HC RX 258: Performed by: INTERNAL MEDICINE

## 2022-11-01 PROCEDURE — 88305 TISSUE EXAM BY PATHOLOGIST: CPT

## 2022-11-01 PROCEDURE — 2580000003 HC RX 258: Performed by: NURSE ANESTHETIST, CERTIFIED REGISTERED

## 2022-11-01 PROCEDURE — 7100000010 HC PHASE II RECOVERY - FIRST 15 MIN: Performed by: INTERNAL MEDICINE

## 2022-11-01 PROCEDURE — 6360000002 HC RX W HCPCS: Performed by: NURSE ANESTHETIST, CERTIFIED REGISTERED

## 2022-11-01 PROCEDURE — 3700000000 HC ANESTHESIA ATTENDED CARE: Performed by: INTERNAL MEDICINE

## 2022-11-01 PROCEDURE — 3609012400 HC EGD TRANSORAL BIOPSY SINGLE/MULTIPLE: Performed by: INTERNAL MEDICINE

## 2022-11-01 PROCEDURE — 2500000003 HC RX 250 WO HCPCS: Performed by: NURSE ANESTHETIST, CERTIFIED REGISTERED

## 2022-11-01 PROCEDURE — 3700000001 HC ADD 15 MINUTES (ANESTHESIA): Performed by: INTERNAL MEDICINE

## 2022-11-01 PROCEDURE — 3609027000 HC COLONOSCOPY: Performed by: INTERNAL MEDICINE

## 2022-11-01 PROCEDURE — 6370000000 HC RX 637 (ALT 250 FOR IP)

## 2022-11-01 PROCEDURE — 7100000011 HC PHASE II RECOVERY - ADDTL 15 MIN: Performed by: INTERNAL MEDICINE

## 2022-11-01 RX ORDER — LIDOCAINE HYDROCHLORIDE 20 MG/ML
INJECTION, SOLUTION EPIDURAL; INFILTRATION; INTRACAUDAL; PERINEURAL PRN
Status: DISCONTINUED | OUTPATIENT
Start: 2022-11-01 | End: 2022-11-01 | Stop reason: SDUPTHER

## 2022-11-01 RX ORDER — SODIUM CHLORIDE, SODIUM LACTATE, POTASSIUM CHLORIDE, CALCIUM CHLORIDE 600; 310; 30; 20 MG/100ML; MG/100ML; MG/100ML; MG/100ML
INJECTION, SOLUTION INTRAVENOUS CONTINUOUS PRN
Status: DISCONTINUED | OUTPATIENT
Start: 2022-11-01 | End: 2022-11-01 | Stop reason: SDUPTHER

## 2022-11-01 RX ORDER — IPRATROPIUM BROMIDE AND ALBUTEROL SULFATE 2.5; .5 MG/3ML; MG/3ML
1 SOLUTION RESPIRATORY (INHALATION)
Status: COMPLETED | OUTPATIENT
Start: 2022-11-01 | End: 2022-11-01

## 2022-11-01 RX ORDER — PROPOFOL 10 MG/ML
INJECTION, EMULSION INTRAVENOUS PRN
Status: DISCONTINUED | OUTPATIENT
Start: 2022-11-01 | End: 2022-11-01 | Stop reason: SDUPTHER

## 2022-11-01 RX ORDER — SODIUM CHLORIDE, SODIUM LACTATE, POTASSIUM CHLORIDE, CALCIUM CHLORIDE 600; 310; 30; 20 MG/100ML; MG/100ML; MG/100ML; MG/100ML
INJECTION, SOLUTION INTRAVENOUS CONTINUOUS
Status: DISCONTINUED | OUTPATIENT
Start: 2022-11-01 | End: 2022-11-01 | Stop reason: HOSPADM

## 2022-11-01 RX ORDER — LIDOCAINE HYDROCHLORIDE 10 MG/ML
0.1 INJECTION, SOLUTION EPIDURAL; INFILTRATION; INTRACAUDAL; PERINEURAL ONCE
Status: DISCONTINUED | OUTPATIENT
Start: 2022-11-01 | End: 2022-11-01 | Stop reason: HOSPADM

## 2022-11-01 RX ORDER — IPRATROPIUM BROMIDE AND ALBUTEROL SULFATE 2.5; .5 MG/3ML; MG/3ML
SOLUTION RESPIRATORY (INHALATION)
Status: COMPLETED
Start: 2022-11-01 | End: 2022-11-01

## 2022-11-01 RX ADMIN — IPRATROPIUM BROMIDE AND ALBUTEROL SULFATE 1 AMPULE: .5; 2.5 SOLUTION RESPIRATORY (INHALATION) at 13:43

## 2022-11-01 RX ADMIN — SODIUM CHLORIDE, POTASSIUM CHLORIDE, SODIUM LACTATE AND CALCIUM CHLORIDE: 600; 310; 30; 20 INJECTION, SOLUTION INTRAVENOUS at 13:54

## 2022-11-01 RX ADMIN — SODIUM CHLORIDE, SODIUM LACTATE, POTASSIUM CHLORIDE, AND CALCIUM CHLORIDE: .6; .31; .03; .02 INJECTION, SOLUTION INTRAVENOUS at 13:54

## 2022-11-01 RX ADMIN — IPRATROPIUM BROMIDE AND ALBUTEROL SULFATE 1 AMPULE: 2.5; .5 SOLUTION RESPIRATORY (INHALATION) at 13:43

## 2022-11-01 RX ADMIN — LIDOCAINE HYDROCHLORIDE 100 MG: 20 INJECTION, SOLUTION EPIDURAL; INFILTRATION; INTRACAUDAL; PERINEURAL at 13:57

## 2022-11-01 RX ADMIN — PROPOFOL 400 MG: 10 INJECTION, EMULSION INTRAVENOUS at 13:57

## 2022-11-01 ASSESSMENT — PAIN - FUNCTIONAL ASSESSMENT: PAIN_FUNCTIONAL_ASSESSMENT: 0-10

## 2022-11-01 ASSESSMENT — PAIN SCALES - GENERAL
PAINLEVEL_OUTOF10: 0

## 2022-11-01 NOTE — ANESTHESIA PRE PROCEDURE
Department of Anesthesiology  Preprocedure Note       Name:  Nuria Stiles   Age:  56 y.o.  :  1975                                          MRN:  [id number]         Date:  2022      Surgeon: Molina Polanco):  Ruby Altamirano MD    Procedure: Procedure(s):  COLONOSCOPY  EGD    Medications prior to admission:   Prior to Admission medications    Medication Sig Start Date End Date Taking? Authorizing Provider   omeprazole (PRILOSEC) 40 MG delayed release capsule Take 1 capsule by mouth every morning (before breakfast) 22   Francisco Florentino MD       Current medications:    Current Facility-Administered Medications   Medication Dose Route Frequency Provider Last Rate Last Admin    lactated ringers infusion   IntraVENous Continuous Ruby Altamirano MD        lidocaine PF 1 % injection 0.1 mL  0.1 mL IntraDERmal Once Ruby Altamirano MD           Allergies:     Allergies   Allergen Reactions    Banana      Scratchy throat cramping in stomach stayed away from bananas for last 10 years       Problem List:    Patient Active Problem List   Diagnosis Code    Biceps tendon rupture, proximal S46.119A       Past Medical History:        Diagnosis Date    Arthritis     Hepatitis C antibody positive in blood 2021    Wears dentures     FULL UPPER PLATE / PARTIAL LOWER PLATE       Past Surgical History:        Procedure Laterality Date    ANKLE SURGERY      FINGER SURGERY Left 2022    OPEN REDUCTION AND INTERNAL FIXATION OF SMALL FINGER CARPOMETACARPAL FRACTURE DISLOCATION performed by Roberto Barnard MD at [address]         Social History:    Social History     Tobacco Use    Smoking status: Former     Packs/day: 0.50     Types: Cigarettes     Quit date:      Years since quitting: 10.8    Smokeless tobacco: Former     Types: Chew     Quit date: 2020   Substance Use Topics    Alcohol use: Not Currently     Comment: 2-3 times a year                                Counseling given: Not Answered      Vital Signs (Current):   Vitals:    10/27/22 0936 11/01/22 1322   BP:  (!) 131/93   Pulse:  72   Resp:  18   Temp:  97.9 °F (36.6 °C)   TempSrc:  Temporal   SpO2:  95%   Weight: 200 lb (90.7 kg) 195 lb (88.5 kg)   Height: 5' 10\" (1.778 m) 5' 10\" (1.778 m)                                              BP Readings from Last 3 Encounters:   11/01/22 (!) 131/93   07/25/22 (!) 141/96   07/19/22 (!) 138/91       NPO Status: Time of last liquid consumption: 1000                        Time of last solid consumption: 0900                        Date of last liquid consumption: 11/01/22                        Date of last solid food consumption: 10/31/22    BMI:   Wt Readings from Last 3 Encounters:   11/01/22 195 lb (88.5 kg)   07/25/22 206 lb (93.4 kg)   07/19/22 200 lb (90.7 kg)     Body mass index is 27.98 kg/m². CBC:   Lab Results   Component Value Date/Time    WBC 8.1 06/28/2022 01:19 AM    RBC 4.75 06/28/2022 01:19 AM    HGB 14.8 06/28/2022 01:19 AM    HCT 42.2 06/28/2022 01:19 AM    MCV 88.9 06/28/2022 01:19 AM    RDW 13.1 06/28/2022 01:19 AM     06/28/2022 01:19 AM       CMP:   Lab Results   Component Value Date/Time     06/28/2022 01:19 AM    K 3.8 06/28/2022 01:19 AM     06/28/2022 01:19 AM    CO2 24 06/28/2022 01:19 AM    BUN 14 06/28/2022 01:19 AM    CREATININE 0.8 06/28/2022 01:19 AM    GFRAA >60 06/28/2022 01:19 AM    GFRAA >60 03/12/2013 02:20 PM    AGRATIO 1.6 06/28/2022 01:19 AM    LABGLOM >60 06/28/2022 01:19 AM    GLUCOSE 167 06/28/2022 01:19 AM    PROT 7.4 06/28/2022 01:19 AM    PROT 7.8 03/12/2013 02:20 PM    CALCIUM 9.1 06/28/2022 01:19 AM    BILITOT 0.5 06/28/2022 01:19 AM    ALKPHOS 46 06/28/2022 01:19 AM    AST 38 06/28/2022 01:19 AM    ALT 46 06/28/2022 01:19 AM       POC Tests: No results for input(s): POCGLU, POCNA, POCK, POCCL, POCBUN, POCHEMO, POCHCT in the last 72 hours.     Coags:   Lab Results   Component Value Date/Time    PROTIME 12.1 03/02/2022 10:40 AM    INR 1.07 03/02/2022 10:40 AM       HCG (If Applicable): No results found for: PREGTESTUR, PREGSERUM, HCG, HCGQUANT     ABGs: No results found for: PHART, PO2ART, ZTQ3HDY, DOD5FAR, BEART, Q3ZWFZDI     Type & Screen (If Applicable):  Lab Results   Component Value Date    LABABO A 03/12/2013    [facility] Positive 03/12/2013       Drug/Infectious Status (If Applicable):  No results found for: HIV, HEPCAB    COVID-19 Screening (If Applicable): No results found for: COVID19        Anesthesia Evaluation  Patient summary reviewed and Nursing notes reviewed  Airway: Mallampati: II  TM distance: >3 FB   Neck ROM: full  Mouth opening: > = 3 FB   Dental:    (+) upper dentures and lower dentures      Pulmonary:Negative Pulmonary ROS   (+) wheezes: scattered                            Cardiovascular:Negative CV ROS                      Neuro/Psych:   Negative Neuro/Psych ROS              GI/Hepatic/Renal: Neg GI/Hepatic/Renal ROS            Endo/Other: Negative Endo/Other ROS                    Abdominal:             Vascular: negative vascular ROS. Other Findings:           Anesthesia Plan      MAC     ASA 2       Induction: intravenous. Anesthetic plan and risks discussed with patient and spouse. Plan discussed with CRNA.     Attending anesthesiologist reviewed and agrees with Preprocedure content                MOOSE So MD   11/1/2022

## 2022-11-01 NOTE — H&P
History and Physical / Pre-Sedation Assessment    Patient:  Cici De La Cruz   :   1975     Intended Procedure: colon EGD     HPI: screen Family h/o colon polyps  Gerd chest pain    Nurses notes reviewed and agreed. Current Facility-Administered Medications   Medication Dose Route Frequency Provider Last Rate Last Admin    lactated ringers infusion   IntraVENous Continuous Christianne Sanchez MD        lidocaine PF 1 % injection 0.1 mL  0.1 mL IntraDERmal Once Christianne Sanchez MD         No current facility-administered medications on file prior to encounter. Current Outpatient Medications on File Prior to Encounter   Medication Sig Dispense Refill    omeprazole (PRILOSEC) 40 MG delayed release capsule Take 1 capsule by mouth every morning (before breakfast) 30 capsule 5     Past Medical History:   Diagnosis Date    Arthritis     Hepatitis C antibody positive in blood 2021    Wears dentures     FULL UPPER PLATE / PARTIAL LOWER PLATE     Past Surgical History:   Procedure Laterality Date    ANKLE SURGERY      FINGER SURGERY Left 2022    OPEN REDUCTION AND INTERNAL FIXATION OF SMALL FINGER CARPOMETACARPAL FRACTURE DISLOCATION performed by Manda Barnes MD at [address]         Allergies: Allergies   Allergen Reactions    Banana      Scratchy throat cramping in stomach stayed away from bananas for last 10 years       Physical Exam:  Vital Signs: Ht 5' 10\" (1.778 m)   Wt 200 lb (90.7 kg)   BMI 28.70 kg/m²  Body mass index is 28.7 kg/m². Airway:Normal  Pulmonary:Normal  Cardiac:Normal  Abdomen:Normal    Pre-Procedure Assessment / Plan:  ASA 2 - Patient with mild systemic disease with no functional limitations  Mallampati 2  Level of Sedation Plan: Moderate  sedation  Post Procedure plan: Return to same level of care    I assessed the patient and find that the patient is in satisfactory condition to proceed with the planned procedure and sedation plan.     I have explained the risk, benefits, and alternatives to the procedure. The patient understands and agrees to proceed.   Yes    Tori Calhoun MD  1:08 PM 11/1/2022

## 2022-11-01 NOTE — PROGRESS NOTES
Awake and alert with no co,plaints. Discharge instructions reviewed with patient/responsible adult and understanding verbalized. Discharge instructions signed and copies given. Patient discharged home with belongings.

## 2022-11-01 NOTE — ANESTHESIA POSTPROCEDURE EVALUATION
Department of Anesthesiology  Postprocedure Note    Patient: Hermila Pérez  MRN: [id number]  YOB: 1975  Date of evaluation: 11/1/2022      Procedure Summary     Date: 11/01/22 Room / Location: [facility]    Anesthesia Start: 1354 Anesthesia Stop: 1419    Procedures:       COLONOSCOPY      EGD BIOPSY Diagnosis:       FH: colon polyps      Gastroesophageal reflux disease, unspecified whether esophagitis present      (FAMILY HX COLON POLYPS, ACID REFLUX)    Surgeons: Juanita Burgos MD Responsible Provider: Bay Wilson MD    Anesthesia Type: MAC ASA Status: 2          Anesthesia Type: No value filed.     Audi Phase I: Audi Score: 10    Audi Phase II:        Anesthesia Post Evaluation    Patient location during evaluation: PACU  Patient participation: complete - patient participated  Level of consciousness: awake  Pain score: 0  Airway patency: patent  Nausea & Vomiting: no nausea  Complications: no  Cardiovascular status: blood pressure returned to baseline  Respiratory status: acceptable  Hydration status: euvolemic

## 2022-11-01 NOTE — DISCHARGE INSTRUCTIONS
ENDOSCOPY:  Inspection of any cavity of the body by means of an endoscopy. An endoscope is a flexible tube that allows direct visualization of the cavity. You have had a Colonoscopy    Discharge Instructions    1)  You may experience some lightheadedness for the next several hours. We suggest you plan on quiet relaxation for the rest of the day. 2)  Because of the sedative drugs in your blood steam, be advised that you should not drive, operate any machinery, or sign contracts for the remainder of the day. 3)  You should not take any alcoholic beverages tonight, and only take sleeping medication that has been specifically prescribed for you by your physician. 4)  Unless instructed differently, resume your regular diet. Eat smaller portions for your first meal and progress to normal amounts over the rest of the day. 5)  If you have any fever, chills, excessive bleeding, uncontrolled pain, increased abdominal bloating, or any other problems, notify your doctor or return to the hospital.    6)  Do not expect a normal bowel movement for one to three days due to the cleansing of the large intestine prior to the colonoscopy. 7) If you have a polyp removed, do not do any strenuous activity and avoid the use of Aspirin or related compounds for 2 week. 8) Call for biopsy results in one week:  [id number] No biopsies. 9)  Follow high fiber diet instruction paper         ENDOSCOPY:  Inspection of any cavity of the body by means of an endoscopy. An endoscope is a flexible tube that allows direct visualization of the cavity. You have had a Esophagogastroduodenoscopy    Discharge Instructions    1)  You may experience some lightheadedness for the next several hours. We suggest you plan on quiet relation for the rest of the day.     2)  Because of the sedative drugs in your blood steam, be advised that you should not drive, operate any machinery, or sign contracts for the remainder of the day. 3)  You should not take any alcoholic beverages tonight, and only take sleeping medication that has been specifically prescribed for you by your physician. 4)  Unless instructed differently, resume your regular diet. Eat smaller portions for your first meal and progress to normal amounts over the rest of the day. 5)  If you have any fever, chills, excessive bleeding, uncontrolled pain, increased abdominal bloating, or any other problems, notify your doctor or return to the hospital.    6)  If you have a sore throat, you may use lozenges, or salt water gargles.     7) Call for biopsy results in one week:  [phone]    9)  Special Discharge Instructions:

## 2022-11-01 NOTE — PROGRESS NOTES
Pt with inspiratory and expiratory wheezed. Non productive raspy cough. Anesthesia at bedside . New orders received and breathing treatment given.

## 2022-11-01 NOTE — PROGRESS NOTES
Endoscopy notified. Pt states breathing is better. Inspiratory and expiratory wheezes in lower lobe bilaterally. Breathing is much better.   Ok to proceed with procedure per Dr. Tony Fowler

## 2022-11-01 NOTE — BRIEF OP NOTE
Bernice Denney   11/1/2022  Colonoscopy & EGD  A pre-procedure re-evaluation was performed immediately prior to the procedure.   Preprocedure Dx: epigastric pain  screen  Postprocedure Dx: No Logan's  Mild gastritis  diverticulosis  Medications: Procedural sedation with Versed & Fentanyl  Complications: None  Estimated Blood Loss: <5cc  Specimens: were obtained  Link Mcgowan MD

## 2022-11-01 NOTE — ANESTHESIA POSTPROCEDURE EVALUATION
Department of Anesthesiology  Postprocedure Note    Patient: Nilson Steward  MRN: [id number]  YOB: 1975  Date of evaluation: 11/1/2022      Procedure Summary     Date: 11/01/22 Room / Location: [facility]    Anesthesia Start: 1354 Anesthesia Stop: 1419    Procedures:       COLONOSCOPY      EGD BIOPSY Diagnosis:       FH: colon polyps      Gastroesophageal reflux disease, unspecified whether esophagitis present      (FAMILY HX COLON POLYPS, ACID REFLUX)    Surgeons: Catarina Garsia MD Responsible Provider: Teresa Cuba MD    Anesthesia Type: MAC ASA Status: 2          Anesthesia Type: No value filed.     Audi Phase I: Audi Score: 10    Audi Phase II:        Anesthesia Post Evaluation    Patient location during evaluation: PACU  Patient participation: complete - patient participated  Level of consciousness: awake  Pain score: 0  Airway patency: patent  Nausea & Vomiting: no nausea  Complications: no  Cardiovascular status: blood pressure returned to baseline  Respiratory status: acceptable  Hydration status: euvolemic

## 2022-11-02 NOTE — OP NOTE
[facility] [address], [place]                                OPERATIVE REPORT    PATIENT NAME: Guillermina Mccann                  :        1975  MED REC NO:   [id number]                          ROOM:  ACCOUNT NO:   [de-identified]                           ADMIT DATE: 2022  PROVIDER:     Bozena Gamboa MD    DATE OF PROCEDURE:  2022    PREOPERATIVE DIAGNOSES:  1.  Epigastric colon cancer screening. 2.  Family history of colon polyps. POSTOPERATIVE DIAGNOSES:  1. No evidence of Logan's. 2.  Minimal gastritis status post biopsy. 3.  Mild diverticulosis. OPERATION PERFORMED:  Colonoscopy. SURGEON:  Bozena Gamboa MD    INDICATIONS:  The patient is a 50-year-old male who has vague epigastric  discomfort. Denies any dysphagia, melena. He does have a family  history of colon polyps. Colonoscopy is being performed. OPERATIVE PROCEDURE:  Consent was obtained. The patient was sedated per  Anesthesia. The Olympus video colonoscope was passed into the  esophagus. Esophagus was normal at 40 cm, where the GE junction was  located. There was no evidence of Logan's. Stomach was visualized on  both antegrade and retrograde views showed minimal antral erythema,  doubtful significance, biopsy was taken for H. pylori. Pylorus was  patent. Duodenum was normal to the second portion. Scope was  withdrawn. The patient was then turned. The Olympus video colonoscope  was inserted, passed to the tip of the cecum. Cecal landmarks were  identified and photographed. Prep was good. Mucosa was examined in a  circular fashion. Upon withdrawal of the scope cecum, ascending,  transverse, descending sigmoid colons were significant for mild sigmoid  diverticulosis. There was no gross evidence of inflammation, ulceration  or mass lesion.   Rectum was visualized both on antegrade and retrograde  views was normal.  He tolerated the procedure well. Blood loss was less  than 5 mL. IMPRESSION:  1. Mild gastritis. We will call him with biopsy results in one week. 2.  Mild diverticulosis. He has been instructed on a high-fiber diet. Followup colonoscopy  should be in 5 years.         Juan Francisco Loera MD    D: 11/01/2022 14:30:39       T: 11/01/2022 20:27:43     [id number]  Job#: [id number]     Doc#: [id number]    CC:  Cassidy Tate MD

## 2023-02-11 ENCOUNTER — APPOINTMENT (OUTPATIENT)
Dept: GENERAL RADIOLOGY | Age: 48
End: 2023-02-11
Payer: COMMERCIAL

## 2023-02-11 ENCOUNTER — HOSPITAL ENCOUNTER (EMERGENCY)
Age: 48
Discharge: HOME | End: 2023-02-11
Attending: EMERGENCY MEDICINE
Payer: COMMERCIAL

## 2023-02-11 VITALS
SYSTOLIC BLOOD PRESSURE: 126 MMHG | TEMPERATURE: 97.3 F | DIASTOLIC BLOOD PRESSURE: 80 MMHG | HEART RATE: 68 BPM | OXYGEN SATURATION: 95 % | HEIGHT: 70 IN | WEIGHT: 200 LBS | RESPIRATION RATE: 15 BRPM | BODY MASS INDEX: 28.63 KG/M2

## 2023-02-11 DIAGNOSIS — K21.9 GASTROESOPHAGEAL REFLUX DISEASE, UNSPECIFIED WHETHER ESOPHAGITIS PRESENT: ICD-10-CM

## 2023-02-11 DIAGNOSIS — R07.89 CHEST WALL PAIN: Primary | ICD-10-CM

## 2023-02-11 DIAGNOSIS — F12.90 MARIJUANA USE: ICD-10-CM

## 2023-02-11 LAB
A/G RATIO: 1.4 (ref 1.1–2.2)
ALBUMIN SERPL-MCNC: 4.6 G/DL (ref 3.4–5)
ALP BLD-CCNC: 44 U/L (ref 40–129)
ALT SERPL-CCNC: 35 U/L (ref 10–40)
AMPHETAMINE SCREEN, URINE: ABNORMAL
ANION GAP SERPL CALCULATED.3IONS-SCNC: 7 MMOL/L (ref 3–16)
AST SERPL-CCNC: 31 U/L (ref 15–37)
BARBITURATE SCREEN URINE: ABNORMAL
BASOPHILS ABSOLUTE: 0.1 K/UL (ref 0–0.2)
BASOPHILS RELATIVE PERCENT: 0.9 %
BENZODIAZEPINE SCREEN, URINE: ABNORMAL
BILIRUB SERPL-MCNC: 0.8 MG/DL (ref 0–1)
BILIRUBIN URINE: NEGATIVE
BLOOD, URINE: NEGATIVE
BUN BLDV-MCNC: 12 MG/DL (ref 7–20)
CALCIUM SERPL-MCNC: 9.5 MG/DL (ref 8.3–10.6)
CANNABINOID SCREEN URINE: POSITIVE
CHLORIDE BLD-SCNC: 101 MMOL/L (ref 99–110)
CLARITY: CLEAR
CO2: 27 MMOL/L (ref 21–32)
COCAINE METABOLITE SCREEN URINE: ABNORMAL
COLOR: YELLOW
CREAT SERPL-MCNC: 0.7 MG/DL (ref 0.9–1.3)
D DIMER: <0.27 UG/ML FEU (ref 0–0.6)
EKG ATRIAL RATE: 63 BPM
EKG DIAGNOSIS: NORMAL
EKG P AXIS: -21 DEGREES
EKG P-R INTERVAL: 136 MS
EKG Q-T INTERVAL: 384 MS
EKG QRS DURATION: 96 MS
EKG QTC CALCULATION (BAZETT): 392 MS
EKG R AXIS: 8 DEGREES
EKG T AXIS: -47 DEGREES
EKG VENTRICULAR RATE: 63 BPM
EOSINOPHILS ABSOLUTE: 0.3 K/UL (ref 0–0.6)
EOSINOPHILS RELATIVE PERCENT: 3.9 %
FENTANYL SCREEN, URINE: ABNORMAL
GFR SERPL CREATININE-BSD FRML MDRD: >60 ML/MIN/{1.73_M2}
GLUCOSE BLD-MCNC: 97 MG/DL (ref 70–99)
GLUCOSE URINE: NEGATIVE MG/DL
HCT VFR BLD CALC: 45.6 % (ref 40.5–52.5)
HEMOGLOBIN: 15.6 G/DL (ref 13.5–17.5)
KETONES, URINE: NEGATIVE MG/DL
LEUKOCYTE ESTERASE, URINE: NEGATIVE
LIPASE: 48 U/L (ref 13–60)
LYMPHOCYTES ABSOLUTE: 3.1 K/UL (ref 1–5.1)
LYMPHOCYTES RELATIVE PERCENT: 41.4 %
Lab: ABNORMAL
MCH RBC QN AUTO: 31 PG (ref 26–34)
MCHC RBC AUTO-ENTMCNC: 34.3 G/DL (ref 31–36)
MCV RBC AUTO: 90.6 FL (ref 80–100)
METHADONE SCREEN, URINE: ABNORMAL
MICROSCOPIC EXAMINATION: NORMAL
MONOCYTES ABSOLUTE: 0.7 K/UL (ref 0–1.3)
MONOCYTES RELATIVE PERCENT: 9.5 %
NEUTROPHILS ABSOLUTE: 3.4 K/UL (ref 1.7–7.7)
NEUTROPHILS RELATIVE PERCENT: 44.3 %
NITRITE, URINE: NEGATIVE
OPIATE SCREEN URINE: ABNORMAL
OXYCODONE URINE: ABNORMAL
PDW BLD-RTO: 13.4 % (ref 12.4–15.4)
PH UA: 6.5
PH UA: 6.5 (ref 5–8)
PHENCYCLIDINE SCREEN URINE: ABNORMAL
PLATELET # BLD: 195 K/UL (ref 135–450)
PMV BLD AUTO: 8.9 FL (ref 5–10.5)
POTASSIUM REFLEX MAGNESIUM: 4.1 MMOL/L (ref 3.5–5.1)
PRO-BNP: 18 PG/ML (ref 0–124)
PROTEIN UA: NEGATIVE MG/DL
RBC # BLD: 5.03 M/UL (ref 4.2–5.9)
SODIUM BLD-SCNC: 135 MMOL/L (ref 136–145)
SPECIFIC GRAVITY UA: 1.02 (ref 1–1.03)
TOTAL PROTEIN: 7.8 G/DL (ref 6.4–8.2)
TROPONIN: <0.01 NG/ML
URINE REFLEX TO CULTURE: NORMAL
URINE TYPE: NORMAL
UROBILINOGEN, URINE: 0.2 E.U./DL
WBC # BLD: 7.6 K/UL (ref 4–11)

## 2023-02-11 PROCEDURE — 93005 ELECTROCARDIOGRAM TRACING: CPT | Performed by: EMERGENCY MEDICINE

## 2023-02-11 PROCEDURE — 2580000003 HC RX 258: Performed by: NURSE PRACTITIONER

## 2023-02-11 PROCEDURE — 80053 COMPREHEN METABOLIC PANEL: CPT

## 2023-02-11 PROCEDURE — 85379 FIBRIN DEGRADATION QUANT: CPT

## 2023-02-11 PROCEDURE — 71046 X-RAY EXAM CHEST 2 VIEWS: CPT

## 2023-02-11 PROCEDURE — 83690 ASSAY OF LIPASE: CPT

## 2023-02-11 PROCEDURE — 80307 DRUG TEST PRSMV CHEM ANLYZR: CPT

## 2023-02-11 PROCEDURE — 81003 URINALYSIS AUTO W/O SCOPE: CPT

## 2023-02-11 PROCEDURE — 83880 ASSAY OF NATRIURETIC PEPTIDE: CPT

## 2023-02-11 PROCEDURE — 85025 COMPLETE CBC W/AUTO DIFF WBC: CPT

## 2023-02-11 PROCEDURE — 84484 ASSAY OF TROPONIN QUANT: CPT

## 2023-02-11 PROCEDURE — 2500000003 HC RX 250 WO HCPCS: Performed by: NURSE PRACTITIONER

## 2023-02-11 PROCEDURE — A4216 STERILE WATER/SALINE, 10 ML: HCPCS | Performed by: NURSE PRACTITIONER

## 2023-02-11 PROCEDURE — 96374 THER/PROPH/DIAG INJ IV PUSH: CPT

## 2023-02-11 PROCEDURE — 99285 EMERGENCY DEPT VISIT HI MDM: CPT

## 2023-02-11 PROCEDURE — 6370000000 HC RX 637 (ALT 250 FOR IP): Performed by: NURSE PRACTITIONER

## 2023-02-11 PROCEDURE — 36415 COLL VENOUS BLD VENIPUNCTURE: CPT

## 2023-02-11 RX ORDER — NITROGLYCERIN 0.4 MG/1
0.4 TABLET SUBLINGUAL EVERY 5 MIN PRN
Status: DISCONTINUED | OUTPATIENT
Start: 2023-02-11 | End: 2023-02-11 | Stop reason: HOSPADM

## 2023-02-11 RX ORDER — LANSOPRAZOLE 15 MG/1
15 CAPSULE, DELAYED RELEASE ORAL DAILY
Qty: 30 CAPSULE | Refills: 3 | Status: SHIPPED | OUTPATIENT
Start: 2023-02-11

## 2023-02-11 RX ORDER — ASPIRIN 325 MG
325 TABLET ORAL ONCE
Status: COMPLETED | OUTPATIENT
Start: 2023-02-11 | End: 2023-02-11

## 2023-02-11 RX ORDER — METHOCARBAMOL 500 MG/1
500 TABLET, FILM COATED ORAL 3 TIMES DAILY
Qty: 15 TABLET | Refills: 0 | Status: SHIPPED | OUTPATIENT
Start: 2023-02-11 | End: 2023-02-16

## 2023-02-11 RX ADMIN — ASPIRIN 325 MG: 325 TABLET ORAL at 14:31

## 2023-02-11 RX ADMIN — FAMOTIDINE 20 MG: 10 INJECTION, SOLUTION INTRAVENOUS at 14:31

## 2023-02-11 RX ADMIN — LIDOCAINE HYDROCHLORIDE: 20 SOLUTION ORAL; TOPICAL at 14:31

## 2023-02-11 ASSESSMENT — ENCOUNTER SYMPTOMS
RHINORRHEA: 0
SORE THROAT: 0
FACIAL SWELLING: 0
SHORTNESS OF BREATH: 0
COLOR CHANGE: 0
ABDOMINAL PAIN: 0

## 2023-02-11 ASSESSMENT — HEART SCORE: ECG: 0

## 2023-02-11 NOTE — ED PROVIDER NOTES
Emergency Department Attending Provider Note  Location: [place] [address] ED  2/11/2023     Chief Complaint   Patient presents with    Chest Pain     C/o chest pain x1 year. Worse today. Also reports numbness in left arm        PATIENT ID:  Dasia Mercado is a 56 y.o. male    Past Medical History:   Diagnosis Date    Arthritis     Hepatitis C antibody positive in blood 08/23/2021    Wears dentures     FULL UPPER PLATE / PARTIAL LOWER PLATE       Dasia Mercado was evaluated in the Emergency Department for ***. Although initial history and physical exam information was obtained by *** (who also dictated a record of this visit), I personally saw the patient and performed a substantive portion of the visit including all aspects of the medical decision making. BASIC EXAM:  ***    EKG Interpretation:  ***        XR CHEST (2 VW)   Final Result   Clear lungs. Labs Reviewed   COMPREHENSIVE METABOLIC PANEL W/ REFLEX TO MG FOR LOW K - Abnormal; Notable for the following components:       Result Value    Sodium 135 (*)     Creatinine 0.7 (*)     All other components within normal limits   CBC WITH AUTO DIFFERENTIAL   LIPASE   BRAIN NATRIURETIC PEPTIDE   TROPONIN   D-DIMER, QUANTITATIVE   URINALYSIS WITH REFLEX TO CULTURE   URINE DRUG SCREEN         PLAN:   -Patient seen and evaluated. Relevant records reviewed. ***          Medications   nitroGLYCERIN (NITROSTAT) SL tablet 0.4 mg (has no administration in time range)   aspirin tablet 325 mg (325 mg Oral Given 2/11/23 1431)   aluminum & magnesium hydroxide-simethicone (MAALOX) 30 mL, lidocaine viscous hcl (XYLOCAINE) 5 mL (GI COCKTAIL) ( Oral Given 2/11/23 1431)   famotidine (PEPCID) 20 mg in sodium chloride (PF) 0.9 % 10 mL injection (20 mg IntraVENous Given 2/11/23 1431)           IMPRESSION:  No diagnosis found. Merly Astudillo DO am the primary clinician of record.      I personally saw the patient and I independently provided *** minutes of non-concurrent critical care out of the total shared critical care time provided. This chart was generated in part by using Dragon Dictation system and may contain errors related to that system including errors in grammar, punctuation, and spelling, as well as words and phrases that may be inappropriate. If there are any questions or concerns please feel free to contact the dictating provider for clarification.      PATT MCNEILL, DO   [employer] musculoskeletal chest pain, however will further investigate for ACS given his family history. Discharged home in stable condition, heart score 2, strict return precautions but at length    HEART SCORE:    History: +0 for low suspicion  EKG: +1 for nonspecific changes   Age: [de-identified] for age <45 years  Risk factors (includes HLD, HTN, DM, tobacco use, obesity, and +FHx): +1 for 1-2 risk factors  Initial troponin: +0 for negative troponin    Heart score: 2. This falls under the following category: Score of 0-3, which indicates a very low risk for major adverse cardiac event and supports early discharge        Medications   aspirin tablet 325 mg (325 mg Oral Given 2/11/23 1431)   aluminum & magnesium hydroxide-simethicone (MAALOX) 30 mL, lidocaine viscous hcl (XYLOCAINE) 5 mL (GI COCKTAIL) ( Oral Given 2/11/23 1431)   famotidine (PEPCID) 20 mg in sodium chloride (PF) 0.9 % 10 mL injection (20 mg IntraVENous Given 2/11/23 1431)           IMPRESSION:    ICD-10-CM    1. Chest wall pain  R07.89 Referral for No Primary Care Physician - Routine     Gato Johnson MD, Cardiology, [facility]     Cardiac Stress Test Exercise Only - Clinic Performed      2. Gastroesophageal reflux disease, unspecified whether esophagitis present  K21.9       3. Marijuana use  F12.90             I, Sharon Mcneill DO am the primary clinician of record. This chart was generated in part by using Dragon Dictation system and may contain errors related to that system including errors in grammar, punctuation, and spelling, as well as words and phrases that may be inappropriate. If there are any questions or concerns please feel free to contact the dictating provider for clarification.      SHARON MCNEILL DO  [facility] DO Yuri  02/13/23 2004

## 2023-02-11 NOTE — ED PROVIDER NOTES
[address] ED  EMERGENCY DEPARTMENT ENCOUNTER      I am the Primary Clinician of Record    Note started: 1:46 PM EST 2/11/23     I have seen and evaluated this patient with my supervising physician No att. providers found. Pt Name: Gaston Byrd  MRN: [id number]  Semajgfjose martin 1975  Dateof evaluation: 2/11/2023  Provider: ELBA Cook CNP  PCP: No primary care provider on file. ED Attending: No att. providers found      [address]       Chief Complaint   Patient presents with    Chest Pain     C/o chest pain x1 year. Worse today. Also reports numbness in left arm       HISTORY OF PRESENTILLNESS   (Location/Symptom, Timing/Onset, Context/Setting, Quality, Duration, Modifying Factors, Severity)  Note limiting factors. Gaston Byrd is a 56 y.o. male for chest pain. Onset was today. Context includes patient reports that he has had intermittent chest pain for over a year however today states it was different. Patient reports that he felt as if his ribs were being ripped apart in his left back. He states that it does radiate around to the left front of his chest.  Patient reports he does have a history of GERD and takes medications however has been feeling well so stopped taking his omeprazole. Patient reports that he has been seen for his chest pain in the past and has not had any follow-up. Patient reports that it is reproducible however it is not worse with a deep breath. Alleviating factors include nothing. Aggravating factors include nothing. Pain is 5/10. Nothing has been used for pain today. Nursing Notes were all reviewed and agreed with or any disagreements were addressed  in the HPI. REVIEW OF SYSTEMS       Review of Systems   Constitutional:  Negative for activity change, appetite change and fever. HENT:  Negative for congestion, facial swelling, rhinorrhea and sore throat. Eyes:  Negative for visual disturbance. Respiratory:  Negative for shortness of breath. Cardiovascular:  Positive for chest pain. Gastrointestinal:  Negative for abdominal pain. Genitourinary:  Negative for difficulty urinating. Musculoskeletal:  Negative for arthralgias and myalgias. Skin:  Negative for color change and rash. Neurological:  Negative for dizziness and light-headedness. Psychiatric/Behavioral:  Negative for agitation. All other systems reviewed and are negative. Positives and Pertinent negatives as per HPI. Except as noted above in the ROS, all other systems were reviewed and negative.        PAST MEDICAL HISTORY     Past Medical History:   Diagnosis Date    Arthritis     Hepatitis C antibody positive in blood 08/23/2021    Wears dentures     FULL UPPER PLATE / PARTIAL LOWER PLATE         SURGICAL HISTORY       Past Surgical History:   Procedure Laterality Date    ANKLE SURGERY      COLONOSCOPY N/A 11/1/2022    COLONOSCOPY performed by Dannie Davidson MD at [address] [place] [address] 7/25/2022    OPEN REDUCTION AND INTERNAL FIXATION OF SMALL FINGER CARPOMETACARPAL FRACTURE DISLOCATION performed by Sandeep Desai MD at [facility] N/A 11/1/2022    EGD BIOPSY performed by Dannie Davidson MD at [address] [place] [address]       Discharge Medication List as of 2/11/2023  3:19 PM        CONTINUE these medications which have NOT CHANGED    Details   omeprazole (PRILOSEC) 40 MG delayed release capsule Take 1 capsule by mouth every morning (before breakfast), Disp-30 capsule, R-5Normal               ALLERGIES     Banana      FAMILY HISTORY       Family History   Problem Relation Age of Onset    Diabetes Mother     High Blood Pressure Father     Atrial Fibrillation Father           SOCIAL HISTORY       Social History     Socioeconomic History    Marital status:      Spouse name: None    Number of children: None    Years of education: None    Highest education level: None   Tobacco Use    Smoking status: Former     Packs/day: 0.50     Types: Cigarettes     Quit date:      Years since quittin.1    Smokeless tobacco: Former     Types: Chew     Quit date: 2020   Vaping Use    Vaping Use: Former   Substance and Sexual Activity    Alcohol use: Not Currently     Comment: 2-3 times a year    Drug use: Yes     Types: Marijuana (Weed)     Comment: TO HELP WITH PAIN -LAST 10/31/22    Sexual activity: Yes     Partners: Female         SCREENINGS    [place] Coma Scale  Eye Opening: Spontaneous  Best Verbal Response: Oriented  Best Motor Response: Obeys commands  Yves Coma Scale Score: 15 Heart Score for chest pain patients  History: Slightly Suspicious  ECG: Normal  Patient Age: > 45 and < 65 years  Risk Factors: No risk factors known  Troponin: < 1X normal limit  Heart Score Total: 1    CIWA Assessment  BP: 126/80  Heart Rate: 68          PHYSICAL EXAM     ED Triage Vitals   BP Temp Temp Source Heart Rate Resp SpO2 Height Weight   23 1319 23 1318 23 1318 23 1317 23 1317 23 1317 23 1317 23 1317   135/87 97.3 °F (36.3 °C) Oral 70 18 96 % 5' 10\" (1.778 m) 200 lb (90.7 kg)       Physical Exam  Constitutional:       Appearance: Normal appearance. He is well-developed. HENT:      Head: Normocephalic and atraumatic. Cardiovascular:      Rate and Rhythm: Normal rate. Pulmonary:      Effort: Pulmonary effort is normal. No respiratory distress. Chest:      Chest wall: Tenderness present. Abdominal:      General: There is no distension. Palpations: Abdomen is soft. Tenderness: There is no abdominal tenderness. Musculoskeletal:         General: Normal range of motion. Cervical back: Normal range of motion. Skin:     General: Skin is warm and dry. Neurological:      Mental Status: He is alert and oriented to person, place, and time.        DIAGNOSTIC RESULTS   LABS:    Labs Reviewed   URINE DRUG SCREEN - Abnormal; Notable for the following components:       Result Value    Cannabinoid Scrn, Ur POSITIVE (*)     All other components within normal limits   COMPREHENSIVE METABOLIC PANEL W/ REFLEX TO MG FOR LOW K - Abnormal; Notable for the following components:    Sodium 135 (*)     Creatinine 0.7 (*)     All other components within normal limits   CBC WITH AUTO DIFFERENTIAL   URINALYSIS WITH REFLEX TO CULTURE   LIPASE   BRAIN NATRIURETIC PEPTIDE   TROPONIN   D-DIMER, QUANTITATIVE         All other labs were within normal range or not returned as of this dictation. EKG: All EKG's are interpreted by the Emergency Department Physician who either signs or Co-signs this chart in the absence of a cardiologist.  Please see their note for interpretation of EKG. RADIOLOGY:   Non plain film images ans such as CT, ultrasound, and MRI are read by the radiologist. Plain radiographic images are visualized and preliminarily interpreted by the ED Provider with the below findings:      Chest x-ray interpreted by radiologist for  FINDINGS:   Clear lungs. No pleural effusion or pneumothorax. Cardiomediastinal   silhouette is unremarkable. Visualized osseous structures are unremarkable. Interpretation per the Radiologist below, if available at the time of this note:    XR CHEST (2 VW)   Final Result   Clear lungs. No results found. No results found. No results found. PROCEDURES   Unless otherwise noted below, none     Procedures     CRITICAL CARE TIME   Unless otherwise noted below, none          EMERGENCYDEPARTMENT COURSE/MDM:     Vitals:    Vitals:    02/11/23 1350 02/11/23 1439 02/11/23 1446 02/11/23 1524   BP:    126/80   Pulse: 66 67 68    Resp: 16 15 15    Temp:       TempSrc:       SpO2: 95% 95% 95%    Weight:       Height:             Patient was seen and evaluated by myself and Randolph. Patient here for concerns for chest pain.   Patient reports that he has had intermittent chest pain for over a year. He reports today he had a sudden episode in which she felt that his left posterior rib cage was being ripped apart. Patient states that this pain was different and worse in nature. He states that it started earlier today. He denies any worsening with a deep breath but it is reproducible. Patient reports that he had a history of GERD but stopped taking his omeprazole because he was feeling better. Patient reports he has been seen multiple times for the chest pain and has always been discharged home. On exam he is awake and alert hemodynamically stable nontoxic in appearance. Lab values been reviewed and interpreted. Heart score was 1. Patient was provided with nitroglycerin and aspirin in the ED. He was also given GI cocktail and an antacid. On reevaluation the patient states that he does feel better. Patient will be discharged with instructions to follow-up with the PCP that was provided in the next 2 to 3 days for reevaluation and to establish care. He was also encouraged to follow-up with cardiologist.  He was given a cardiology referral.  Patient was given prescriptions for muscle relaxers and for antacids. He was also given a GI referral.  Patient was ultimately discharged with all questions answered.         Medications   nitroGLYCERIN (NITROSTAT) SL tablet 0.4 mg (has no administration in time range)   aspirin tablet 325 mg (325 mg Oral Given 2/11/23 1431)   aluminum & magnesium hydroxide-simethicone (MAALOX) 30 mL, lidocaine viscous hcl (XYLOCAINE) 5 mL (GI COCKTAIL) ( Oral Given 2/11/23 1431)   famotidine (PEPCID) 20 mg in sodium chloride (PF) 0.9 % 10 mL injection (20 mg IntraVENous Given 2/11/23 1431)            CONSULTS:  None      Discussion with other professionals - none    Social determinants - none    Records Reviewed - none    History from - patient    Limitations to history- none    Chronic conditions: has a past medical history of Arthritis, Hepatitis C antibody positive in blood (08/23/2021), and Wears dentures. Is this patient to be included in the SEP-1 Core Measure due to severe sepsis or septic shock? No   Exclusion criteria - the patient is NOT to be included for SEP-1 Core Measure due to: Infection is not suspected         The patient tolerated their visit well. I have evaluated this patient. My supervising physician was available for consultation. The patient and / or the family were informed of the results of any tests, a time was given to answer questions, a plan was proposed and they agreed with plan. FINAL IMPRESSION      1. Chest wall pain    2. Gastroesophageal reflux disease, unspecified whether esophagitis present    3. Marijuana use          DISPOSITION/PLAN   DISPOSITION Decision To Discharge 02/11/2023 02:52:16 PM      PATIENT REFERRED TO:  Latasha Younguth  [phone]  Schedule an appointment as soon as possible for a visit in 2 days  for re-evaluation, to establish primary care    Crystal Perez MD  [address] [place] [address].   [facility]  [phone]    Schedule an appointment as soon as possible for a visit in 2 days  for re-evaluation    [place] [facility] [place] [facility]  [address] [facility]    If symptoms worsen    Sae Paige, [address]    Schedule an appointment as soon as possible for a visit in 2 days  for re-evaluation    DISCHARGE MEDICATIONS:  Discharge Medication List as of 2/11/2023  3:19 PM        START taking these medications    Details   lansoprazole (PREVACID) 15 MG delayed release capsule Take 1 capsule by mouth daily, Disp-30 capsule, R-3Print      methocarbamol (ROBAXIN) 500 MG tablet Take 1 tablet by mouth 3 times daily for 5 days, Disp-15 tablet, R-0Print             DISCONTINUED MEDICATIONS:  Discharge Medication List as of 2/11/2023  3:19 PM                 (Please note that portions of this note were completed with a voice recognition program.  Efforts were made to edit the dictations but occasionally words are mis-transcribed.)    Patient was seen during the time of the [place] pandemic. N95 and appropriate PPE was worn during the visit.       ELBA Zhu - CNP (electronically signed)        ELBA Zhu - CNP  02/11/23 [address] ELBA Anderson - [place]  02/11/23 5142

## 2023-02-28 LAB
ALBUMIN SERPL-MCNC: 4.6 G/DL
ALP BLD-CCNC: 40 U/L
ALT SERPL-CCNC: 31 U/L
ANION GAP SERPL CALCULATED.3IONS-SCNC: 0 MMOL/L
AST SERPL-CCNC: 26 U/L
BASOPHILS ABSOLUTE: 0.1 /ΜL
BASOPHILS RELATIVE PERCENT: 1 %
BILIRUB SERPL-MCNC: 0.8 MG/DL (ref 0.1–1.4)
BUN BLDV-MCNC: 16 MG/DL
CALCIUM SERPL-MCNC: 9.5 MG/DL
CHLORIDE BLD-SCNC: 105 MMOL/L
CHOLESTEROL, TOTAL: 111 MG/DL
CHOLESTEROL/HDL RATIO: 0
CO2: 23 MMOL/L
CREAT SERPL-MCNC: 0.92 MG/DL
EGFR: 104
EOSINOPHILS ABSOLUTE: 0.3 /ΜL
EOSINOPHILS RELATIVE PERCENT: 3 %
GLUCOSE BLD-MCNC: 118 MG/DL
HCT VFR BLD CALC: 44.7 % (ref 41–53)
HDLC SERPL-MCNC: 31 MG/DL (ref 35–70)
HEMOGLOBIN: 15.3 G/DL (ref 13.5–17.5)
LDL CHOLESTEROL CALCULATED: 50 MG/DL (ref 0–160)
LYMPHOCYTES ABSOLUTE: 2.5 /ΜL
LYMPHOCYTES RELATIVE PERCENT: 32 %
MCH RBC QN AUTO: 30.8 PG
MCHC RBC AUTO-ENTMCNC: 34.2 G/DL
MCV RBC AUTO: 90 FL
MONOCYTES ABSOLUTE: 0.9 /ΜL
MONOCYTES RELATIVE PERCENT: 11 %
NEUTROPHILS ABSOLUTE: 4.2 /ΜL
NEUTROPHILS RELATIVE PERCENT: 53 %
NONHDLC SERPL-MCNC: 0 MG/DL
PLATELET # BLD: 236 K/ΜL
PMV BLD AUTO: 0 FL
POTASSIUM SERPL-SCNC: 4.1 MMOL/L
RBC # BLD: 4.97 10^6/ΜL
SODIUM BLD-SCNC: 142 MMOL/L
TOTAL PROTEIN: 7.4
TRIGL SERPL-MCNC: 183 MG/DL
TSH SERPL DL<=0.05 MIU/L-ACNC: 1.33 UIU/ML
VLDLC SERPL CALC-MCNC: 30 MG/DL
WBC # BLD: 7.8 10^3/ML

## 2023-03-10 ENCOUNTER — HOSPITAL ENCOUNTER (OUTPATIENT)
Age: 48
Discharge: HOME | End: 2023-03-10
Payer: COMMERCIAL

## 2023-03-10 ENCOUNTER — HOSPITAL ENCOUNTER (OUTPATIENT)
Dept: GENERAL RADIOLOGY | Age: 48
Discharge: HOME | End: 2023-03-10
Payer: COMMERCIAL

## 2023-03-10 DIAGNOSIS — M54.2 NECK PAIN ON LEFT SIDE: ICD-10-CM

## 2023-03-10 PROCEDURE — 72040 X-RAY EXAM NECK SPINE 2-3 VW: CPT

## 2023-03-15 NOTE — PATIENT INSTRUCTIONS
1. Hepatitis C, genotype 1a/1b with quantitative RNA 3.94 UI/mL. This can be treated. We will refer to gastroenterology for confirmation of treatment. 2. Right shoulder pain with adhesive capsulitis (frozen shoulder):  Related to prior trauma. Mostly resolved. Range of motion with \"concentric circles\" with both arms outstretched.    Continue taking cyclobenzaprine (Flexeril) 10 mg at bedtime.  Continue with range of motion exercises.         Follow-up after seen by gastroenterology  Refer gastroenterology in the Clinic for hepatitis C treatment evaluation no

## 2023-03-23 ENCOUNTER — HOSPITAL ENCOUNTER (OUTPATIENT)
Dept: CT IMAGING | Age: 48
Discharge: HOME | End: 2023-03-23
Payer: COMMERCIAL

## 2023-03-23 DIAGNOSIS — R07.89 ATYPICAL CHEST PAIN: ICD-10-CM

## 2023-03-23 PROCEDURE — 71260 CT THORAX DX C+: CPT

## 2023-03-23 PROCEDURE — 6360000004 HC RX CONTRAST MEDICATION: Performed by: NURSE PRACTITIONER

## 2023-03-23 RX ADMIN — IOPAMIDOL 75 ML: 755 INJECTION, SOLUTION INTRAVENOUS at 15:05

## 2023-03-31 NOTE — PROGRESS NOTES
or temperature intolerance. No excessive thirst, fluid intake, or urination. No tremor. Hematologic/Lymphatic: No abnormal bruising or bleeding, blood clots or swollen lymph nodes. Allergic/Immunologic: No nasal congestion or hives. Physical Examination:    Vitals:    04/03/23 1514   BP: 117/80   Pulse: 79   Temp: 98.6 °F (37 °C)   SpO2: 96%   Weight: 205 lb 9.6 oz (93.3 kg)   Height: 5' 11\" (1.803 m)       Constitutional and General Appearance: NAD   Respiratory:  Normal excursion and expansion without use of accessory muscles  Resp Auscultation: Clear, no crackles or wheezes   Cardiovascular: The apical impulses not displaced  Heart tones are crisp and normal  Cervical veins are not engorged  The carotid upstroke is normal in amplitude and contour without delay or bruit  Normal S1S2, No S3, No Murmur  Peripheral pulses are symmetrical and full  There is no clubbing, cyanosis of the extremities. No edema  Femoral Arteries: 2+ and equal  Pedal Pulses: 2+ and equal   Abdomen:  No masses or tenderness  Liver/Spleen: No Abnormalities Noted  Neurological/Psychiatric:  Alert and oriented in all spheres  Moves all extremities well  Exhibits normal gait balance and coordination  No abnormalities of mood, affect, memory, mentation, or behavior are noted  Skin:  Skin: warm and dry. Assessment:     1. Chest pain, unspecified type: Unspecified CP in middle-aged male with no sig CAD RF's other than age and mild tob abuse years ago. Numerous EKG's reviewed without concerning findings. Will plan for plain GXT to assess exercise tolerance and EKG response to stress and order ECHO to evaluate LV function and size, wall motion, and valves for any structural abnormalities. ? Any congenital issue undiagnosed. 2. Palpitations: No symptoms recently. He will call if has further issues that are bothersome and I can order event monitoring. Plan:  Current medications reviewed.   Refills given as

## 2023-04-03 ENCOUNTER — OFFICE VISIT (OUTPATIENT)
Dept: CARDIOLOGY CLINIC | Age: 48
End: 2023-04-03

## 2023-04-03 VITALS
TEMPERATURE: 98.6 F | DIASTOLIC BLOOD PRESSURE: 80 MMHG | OXYGEN SATURATION: 96 % | BODY MASS INDEX: 28.78 KG/M2 | SYSTOLIC BLOOD PRESSURE: 117 MMHG | HEART RATE: 79 BPM | WEIGHT: 205.6 LBS | HEIGHT: 71 IN

## 2023-04-03 DIAGNOSIS — F17.201 MILD TOBACCO ABUSE IN SUSTAINED REMISSION: ICD-10-CM

## 2023-04-03 DIAGNOSIS — R07.9 CHEST PAIN, UNSPECIFIED TYPE: Primary | ICD-10-CM

## 2023-04-03 DIAGNOSIS — R00.2 PALPITATIONS: ICD-10-CM

## 2023-04-03 DIAGNOSIS — R07.89 OTHER CHEST PAIN: ICD-10-CM

## 2023-04-03 DIAGNOSIS — F12.90 MARIJUANA USER: ICD-10-CM

## 2023-04-03 RX ORDER — AMLODIPINE BESYLATE 5 MG/1
5 TABLET ORAL DAILY
COMMUNITY

## 2023-04-03 NOTE — PATIENT INSTRUCTIONS
Plan:  Current medications reviewed. Refills given as warranted. Call [phone] to schedule An Echocardiogram   - Looks at the size and strength of your heart  - This test is an ultrasound of your heart just like when you see an ultrasound that a woman has when she is pregnant.  - The test records the movement of your heart valves and chambers. - It evaluates heart valves, chamber enlargement, abnormal openings, or any fluid in the sac surrounding the heart. Call [phone] to schedule a Plain Stress Test   - test will look for blockages and the blood flow through the heart              -wear comfortable clothes and shoes for walking on the treadmill     Stress Test instructions  - Allow 3-4 hours for the entire test to be complete.   - Nothing to eat or drink except water after midnight. If you are scheduled in the afternoon, you are permitted to eat a light breakfast such as a bowl of cereal or toast.   - Do not drink or eat anything containing caffeine 24 hours prior to test. This includes coffee, tea, soda, and chocolate- not even decaffeinated or caffeine free products. - take your regular medications as prescribed the morning of procedures (except betablockers). - If you are diabetic, bring your glucometer, insulin, and oral medication with you and you may take it after your first injection. Please call with your questions or concerns. - if you are on viagra/cialis, do not take it 48 hours prior to test.   I will personally review your tests and then I will have my staff call you with the results. If you begin getting palpitations again, call and let me know and I can order a 2 week or 30 day monitor.       Follow up with me as needed depending on the testing

## 2023-05-09 ENCOUNTER — HOSPITAL ENCOUNTER (EMERGENCY)
Age: 48
Discharge: HOME | End: 2023-05-10
Attending: STUDENT IN AN ORGANIZED HEALTH CARE EDUCATION/TRAINING PROGRAM
Payer: COMMERCIAL

## 2023-05-09 DIAGNOSIS — I10 HYPERTENSION, UNSPECIFIED TYPE: Primary | ICD-10-CM

## 2023-05-09 DIAGNOSIS — R21 RASH AND OTHER NONSPECIFIC SKIN ERUPTION: ICD-10-CM

## 2023-05-09 PROCEDURE — 96374 THER/PROPH/DIAG INJ IV PUSH: CPT

## 2023-05-09 PROCEDURE — 99284 EMERGENCY DEPT VISIT MOD MDM: CPT

## 2023-05-09 PROCEDURE — 93005 ELECTROCARDIOGRAM TRACING: CPT | Performed by: STUDENT IN AN ORGANIZED HEALTH CARE EDUCATION/TRAINING PROGRAM

## 2023-05-10 ENCOUNTER — APPOINTMENT (OUTPATIENT)
Dept: GENERAL RADIOLOGY | Age: 48
End: 2023-05-10
Payer: COMMERCIAL

## 2023-05-10 VITALS
HEIGHT: 70 IN | DIASTOLIC BLOOD PRESSURE: 81 MMHG | HEART RATE: 61 BPM | WEIGHT: 207.8 LBS | TEMPERATURE: 98.5 F | OXYGEN SATURATION: 94 % | RESPIRATION RATE: 13 BRPM | BODY MASS INDEX: 29.75 KG/M2 | SYSTOLIC BLOOD PRESSURE: 112 MMHG

## 2023-05-10 LAB
ALBUMIN SERPL-MCNC: 4.7 G/DL (ref 3.4–5)
ALBUMIN/GLOB SERPL: 1.5 {RATIO} (ref 1.1–2.2)
ALP SERPL-CCNC: 43 U/L (ref 40–129)
ALT SERPL-CCNC: 35 U/L (ref 10–40)
ANION GAP SERPL CALCULATED.3IONS-SCNC: 13 MMOL/L (ref 3–16)
AST SERPL-CCNC: 31 U/L (ref 15–37)
BASOPHILS # BLD: 0 K/UL (ref 0–0.2)
BASOPHILS NFR BLD: 0.6 %
BILIRUB SERPL-MCNC: 0.9 MG/DL (ref 0–1)
BUN SERPL-MCNC: 17 MG/DL (ref 7–20)
CALCIUM SERPL-MCNC: 9.5 MG/DL (ref 8.3–10.6)
CHLORIDE SERPL-SCNC: 104 MMOL/L (ref 99–110)
CO2 SERPL-SCNC: 24 MMOL/L (ref 21–32)
CREAT SERPL-MCNC: 0.8 MG/DL (ref 0.9–1.3)
DEPRECATED RDW RBC AUTO: 13.1 % (ref 12.4–15.4)
EKG ATRIAL RATE: 83 BPM
EKG DIAGNOSIS: NORMAL
EKG P AXIS: 39 DEGREES
EKG P-R INTERVAL: 152 MS
EKG Q-T INTERVAL: 372 MS
EKG QRS DURATION: 92 MS
EKG QTC CALCULATION (BAZETT): 437 MS
EKG R AXIS: 42 DEGREES
EKG T AXIS: 45 DEGREES
EKG VENTRICULAR RATE: 83 BPM
EOSINOPHIL # BLD: 0.3 K/UL (ref 0–0.6)
EOSINOPHIL NFR BLD: 3.8 %
GFR SERPLBLD CREATININE-BSD FMLA CKD-EPI: >60 ML/MIN/{1.73_M2}
GLUCOSE SERPL-MCNC: 124 MG/DL (ref 70–99)
HCT VFR BLD AUTO: 44.3 % (ref 40.5–52.5)
HGB BLD-MCNC: 15.7 G/DL (ref 13.5–17.5)
LYMPHOCYTES # BLD: 4 K/UL (ref 1–5.1)
LYMPHOCYTES NFR BLD: 46.2 %
MCH RBC QN AUTO: 31.4 PG (ref 26–34)
MCHC RBC AUTO-ENTMCNC: 35.4 G/DL (ref 31–36)
MCV RBC AUTO: 88.6 FL (ref 80–100)
MONOCYTES # BLD: 0.9 K/UL (ref 0–1.3)
MONOCYTES NFR BLD: 11 %
NEUTROPHILS # BLD: 3.3 K/UL (ref 1.7–7.7)
NEUTROPHILS NFR BLD: 38.4 %
PLATELET # BLD AUTO: 224 K/UL (ref 135–450)
PMV BLD AUTO: 9.1 FL (ref 5–10.5)
POTASSIUM SERPL-SCNC: 3.8 MMOL/L (ref 3.5–5.1)
PROT SERPL-MCNC: 7.9 G/DL (ref 6.4–8.2)
RBC # BLD AUTO: 5 M/UL (ref 4.2–5.9)
SODIUM SERPL-SCNC: 141 MMOL/L (ref 136–145)
TROPONIN, HIGH SENSITIVITY: 10 NG/L (ref 0–22)
TROPONIN, HIGH SENSITIVITY: 9 NG/L (ref 0–22)
WBC # BLD AUTO: 8.6 K/UL (ref 4–11)

## 2023-05-10 PROCEDURE — 85025 COMPLETE CBC W/AUTO DIFF WBC: CPT

## 2023-05-10 PROCEDURE — 6370000000 HC RX 637 (ALT 250 FOR IP): Performed by: STUDENT IN AN ORGANIZED HEALTH CARE EDUCATION/TRAINING PROGRAM

## 2023-05-10 PROCEDURE — 80053 COMPREHEN METABOLIC PANEL: CPT

## 2023-05-10 PROCEDURE — 84484 ASSAY OF TROPONIN QUANT: CPT

## 2023-05-10 PROCEDURE — 36415 COLL VENOUS BLD VENIPUNCTURE: CPT

## 2023-05-10 PROCEDURE — 6360000002 HC RX W HCPCS: Performed by: STUDENT IN AN ORGANIZED HEALTH CARE EDUCATION/TRAINING PROGRAM

## 2023-05-10 PROCEDURE — 71045 X-RAY EXAM CHEST 1 VIEW: CPT

## 2023-05-10 RX ORDER — HYDROXYZINE HYDROCHLORIDE 25 MG/1
50 TABLET, FILM COATED ORAL ONCE
Status: COMPLETED | OUTPATIENT
Start: 2023-05-10 | End: 2023-05-10

## 2023-05-10 RX ORDER — ONDANSETRON 2 MG/ML
4 INJECTION INTRAMUSCULAR; INTRAVENOUS ONCE
Status: COMPLETED | OUTPATIENT
Start: 2023-05-10 | End: 2023-05-10

## 2023-05-10 RX ADMIN — ONDANSETRON 4 MG: 2 INJECTION INTRAMUSCULAR; INTRAVENOUS at 01:03

## 2023-05-10 RX ADMIN — HYDROXYZINE HYDROCHLORIDE 50 MG: 25 TABLET ORAL at 01:03

## 2023-05-10 ASSESSMENT — HEART SCORE: ECG: 0

## 2023-05-10 NOTE — ED PROVIDER NOTES
[address] ED     EMERGENCY DEPARTMENT ENCOUNTER            Pt Name: Meagan Menon   MRN: [id number]   [place] 1975   Date of evaluation: 5/9/2023   Provider: Nico Lao MD   PCP: ELBA Tobias NP   Note Started: 11:54 PM EDT 5/9/23          CHIEF COMPLAINT     Chief Complaint   Patient presents with    Hypertension     Pt states he's been feeling weird tonight so he decided to check his bp. had an episode of high BP BSFFVVF.(048 systolic) states feeling really hot and itchy. Legs has been shaky and trembling. Chest heaviness noted on triage. HISTORY OF PRESENT ILLNESS:   History from : Patient   Limitations to history : None     Meagan Menon is a 56 y.o. male who presents complaining of episode of high blood pressure, generalized malaise, chest heaviness. Patient states that he has had similar symptoms to this several times over the past year. He states that he also developed a rash on his forehead and a sensation of itching in his chest.  He states that he has seen a cardiologist as well as a gastroenterologist and nobody has figured out why he is having these episodes. He states that he took his blood pressure at home and had a systolic greater than 222. He states that he is currently starting to feel slightly better. He denies any numbness or tingling. He denies any other complaints or concerns. Nursing Notes were all reviewed and agreed with, or any disagreements were addressed in the HPI. REVIEW OF SYSTEMS :    Positives and Pertinent negatives as per HPI. MEDICAL HISTORY   has a past medical history of Arthritis, Hepatitis C antibody positive in blood (08/23/2021), and Wears dentures.     Past Surgical History:   Procedure Laterality Date    ANKLE SURGERY      COLONOSCOPY N/A 11/1/2022    COLONOSCOPY performed by Ryan Christensen MD at [address] [place] [address] Left 7/25/2022    OPEN REDUCTION AND INTERNAL FIXATION

## 2023-05-11 RX ORDER — AMLODIPINE BESYLATE 5 MG/1
TABLET ORAL
Qty: 30 TABLET | OUTPATIENT
Start: 2023-05-11

## 2023-05-11 NOTE — TELEPHONE ENCOUNTER
Requested Prescriptions     Pending Prescriptions Disp Refills    amLODIPine (NORVASC) 5 MG tablet [Pharmacy Med Name: amLODIPine BESYLATE 5 MG TAB] 30 tablet      Sig: TAKE ONE TABLET BY MOUTH DAILY

## 2023-06-09 ENCOUNTER — TELEPHONE (OUTPATIENT)
Dept: FAMILY MEDICINE CLINIC | Age: 48
End: 2023-06-09

## 2023-06-09 ENCOUNTER — APPOINTMENT (OUTPATIENT)
Dept: GENERAL RADIOLOGY | Age: 48
End: 2023-06-09
Payer: COMMERCIAL

## 2023-06-09 ENCOUNTER — HOSPITAL ENCOUNTER (EMERGENCY)
Age: 48
Discharge: HOME | End: 2023-06-09
Attending: EMERGENCY MEDICINE
Payer: COMMERCIAL

## 2023-06-09 VITALS
BODY MASS INDEX: 27.98 KG/M2 | DIASTOLIC BLOOD PRESSURE: 88 MMHG | RESPIRATION RATE: 18 BRPM | OXYGEN SATURATION: 100 % | WEIGHT: 195 LBS | TEMPERATURE: 98 F | SYSTOLIC BLOOD PRESSURE: 133 MMHG | HEART RATE: 77 BPM

## 2023-06-09 DIAGNOSIS — F41.9 ANXIETY: ICD-10-CM

## 2023-06-09 DIAGNOSIS — R07.9 CHEST PAIN, UNSPECIFIED TYPE: Primary | ICD-10-CM

## 2023-06-09 LAB
ALBUMIN SERPL-MCNC: 4.7 G/DL (ref 3.4–5)
ALBUMIN/GLOB SERPL: 1.6 {RATIO} (ref 1.1–2.2)
ALP SERPL-CCNC: 44 U/L (ref 40–129)
ALT SERPL-CCNC: 34 U/L (ref 10–40)
AMPHETAMINES UR QL SCN>1000 NG/ML: ABNORMAL
ANION GAP SERPL CALCULATED.3IONS-SCNC: 9 MMOL/L (ref 3–16)
AST SERPL-CCNC: 33 U/L (ref 15–37)
BARBITURATES UR QL SCN>200 NG/ML: ABNORMAL
BASOPHILS # BLD: 0 K/UL (ref 0–0.2)
BASOPHILS NFR BLD: 0.6 %
BENZODIAZ UR QL SCN>200 NG/ML: ABNORMAL
BILIRUB SERPL-MCNC: 0.9 MG/DL (ref 0–1)
BILIRUB UR QL STRIP.AUTO: NEGATIVE
BUN SERPL-MCNC: 13 MG/DL (ref 7–20)
CALCIUM SERPL-MCNC: 9.8 MG/DL (ref 8.3–10.6)
CANNABINOIDS UR QL SCN>50 NG/ML: POSITIVE
CHLORIDE SERPL-SCNC: 104 MMOL/L (ref 99–110)
CLARITY UR: CLEAR
CO2 SERPL-SCNC: 25 MMOL/L (ref 21–32)
COCAINE UR QL SCN: ABNORMAL
COLOR UR: YELLOW
CREAT SERPL-MCNC: 0.6 MG/DL (ref 0.9–1.3)
DEPRECATED RDW RBC AUTO: 13.5 % (ref 12.4–15.4)
DRUG SCREEN COMMENT UR-IMP: ABNORMAL
EKG ATRIAL RATE: 70 BPM
EKG DIAGNOSIS: NORMAL
EKG P AXIS: 33 DEGREES
EKG P-R INTERVAL: 146 MS
EKG Q-T INTERVAL: 370 MS
EKG QRS DURATION: 96 MS
EKG QTC CALCULATION (BAZETT): 399 MS
EKG R AXIS: 25 DEGREES
EKG T AXIS: 23 DEGREES
EKG VENTRICULAR RATE: 70 BPM
EOSINOPHIL # BLD: 0.1 K/UL (ref 0–0.6)
EOSINOPHIL NFR BLD: 1.6 %
FENTANYL SCREEN, URINE: ABNORMAL
GFR SERPLBLD CREATININE-BSD FMLA CKD-EPI: >60 ML/MIN/{1.73_M2}
GLUCOSE SERPL-MCNC: 97 MG/DL (ref 70–99)
GLUCOSE UR STRIP.AUTO-MCNC: NEGATIVE MG/DL
HCT VFR BLD AUTO: 46.4 % (ref 40.5–52.5)
HGB BLD-MCNC: 15 G/DL (ref 13.5–17.5)
HGB UR QL STRIP.AUTO: NEGATIVE
KETONES UR STRIP.AUTO-MCNC: NEGATIVE MG/DL
LEUKOCYTE ESTERASE UR QL STRIP.AUTO: NEGATIVE
LYMPHOCYTES # BLD: 2.3 K/UL (ref 1–5.1)
LYMPHOCYTES NFR BLD: 32.5 %
MCH RBC QN AUTO: 30.9 PG (ref 26–34)
MCHC RBC AUTO-ENTMCNC: 32.3 G/DL (ref 31–36)
MCV RBC AUTO: 95.7 FL (ref 80–100)
METHADONE UR QL SCN>300 NG/ML: ABNORMAL
MONOCYTES # BLD: 0.9 K/UL (ref 0–1.3)
MONOCYTES NFR BLD: 12.4 %
NEUTROPHILS # BLD: 3.7 K/UL (ref 1.7–7.7)
NEUTROPHILS NFR BLD: 52.9 %
NITRITE UR QL STRIP.AUTO: NEGATIVE
NT-PROBNP SERPL-MCNC: <36 PG/ML (ref 0–124)
OPIATES UR QL SCN>300 NG/ML: ABNORMAL
OXYCODONE UR QL SCN: ABNORMAL
PCP UR QL SCN>25 NG/ML: ABNORMAL
PH UR STRIP.AUTO: 7 [PH] (ref 5–8)
PH UR STRIP: 6 [PH]
PLATELET # BLD AUTO: 163 K/UL (ref 135–450)
PMV BLD AUTO: 10.4 FL (ref 5–10.5)
POTASSIUM SERPL-SCNC: 3.9 MMOL/L (ref 3.5–5.1)
PROT SERPL-MCNC: 7.6 G/DL (ref 6.4–8.2)
PROT UR STRIP.AUTO-MCNC: NEGATIVE MG/DL
RBC # BLD AUTO: 4.85 M/UL (ref 4.2–5.9)
SODIUM SERPL-SCNC: 138 MMOL/L (ref 136–145)
SP GR UR STRIP.AUTO: 1.01 (ref 1–1.03)
TROPONIN, HIGH SENSITIVITY: 10 NG/L (ref 0–22)
TROPONIN, HIGH SENSITIVITY: 7 NG/L (ref 0–22)
UA COMPLETE W REFLEX CULTURE PNL UR: NORMAL
UA DIPSTICK W REFLEX MICRO PNL UR: NORMAL
URN SPEC COLLECT METH UR: NORMAL
UROBILINOGEN UR STRIP-ACNC: 0.2 E.U./DL
WBC # BLD AUTO: 7 K/UL (ref 4–11)

## 2023-06-09 PROCEDURE — 80053 COMPREHEN METABOLIC PANEL: CPT

## 2023-06-09 PROCEDURE — 93005 ELECTROCARDIOGRAM TRACING: CPT | Performed by: NURSE PRACTITIONER

## 2023-06-09 PROCEDURE — 84484 ASSAY OF TROPONIN QUANT: CPT

## 2023-06-09 PROCEDURE — 71046 X-RAY EXAM CHEST 2 VIEWS: CPT

## 2023-06-09 PROCEDURE — 81003 URINALYSIS AUTO W/O SCOPE: CPT

## 2023-06-09 PROCEDURE — 6370000000 HC RX 637 (ALT 250 FOR IP): Performed by: NURSE PRACTITIONER

## 2023-06-09 PROCEDURE — 80307 DRUG TEST PRSMV CHEM ANLYZR: CPT

## 2023-06-09 PROCEDURE — 83880 ASSAY OF NATRIURETIC PEPTIDE: CPT

## 2023-06-09 PROCEDURE — 36415 COLL VENOUS BLD VENIPUNCTURE: CPT

## 2023-06-09 PROCEDURE — 85025 COMPLETE CBC W/AUTO DIFF WBC: CPT

## 2023-06-09 RX ORDER — ASPIRIN 325 MG
325 TABLET ORAL ONCE
Status: COMPLETED | OUTPATIENT
Start: 2023-06-09 | End: 2023-06-09

## 2023-06-09 RX ORDER — FAMOTIDINE 20 MG/1
20 TABLET, FILM COATED ORAL ONCE
Status: COMPLETED | OUTPATIENT
Start: 2023-06-09 | End: 2023-06-09

## 2023-06-09 RX ORDER — HYDROXYZINE PAMOATE 25 MG/1
25 CAPSULE ORAL 3 TIMES DAILY PRN
Qty: 15 CAPSULE | Refills: 0 | Status: SHIPPED | OUTPATIENT
Start: 2023-06-09 | End: 2023-06-14

## 2023-06-09 RX ORDER — HYDROXYZINE HYDROCHLORIDE 25 MG/1
50 TABLET, FILM COATED ORAL ONCE
Status: COMPLETED | OUTPATIENT
Start: 2023-06-09 | End: 2023-06-09

## 2023-06-09 RX ORDER — NITROGLYCERIN 0.4 MG/1
0.4 TABLET SUBLINGUAL EVERY 5 MIN PRN
Status: DISCONTINUED | OUTPATIENT
Start: 2023-06-09 | End: 2023-06-09 | Stop reason: HOSPADM

## 2023-06-09 RX ADMIN — LIDOCAINE HYDROCHLORIDE: 20 SOLUTION ORAL; TOPICAL at 13:23

## 2023-06-09 RX ADMIN — NITROGLYCERIN 0.4 MG: 0.4 TABLET SUBLINGUAL at 13:57

## 2023-06-09 RX ADMIN — ASPIRIN 325 MG: 325 TABLET ORAL at 13:23

## 2023-06-09 RX ADMIN — FAMOTIDINE 20 MG: 20 TABLET, FILM COATED ORAL at 13:23

## 2023-06-09 RX ADMIN — HYDROXYZINE HYDROCHLORIDE 50 MG: 25 TABLET ORAL at 14:19

## 2023-06-09 ASSESSMENT — PAIN SCALES - GENERAL: PAINLEVEL_OUTOF10: 2

## 2023-06-09 ASSESSMENT — HEART SCORE: ECG: 0

## 2023-06-09 ASSESSMENT — ENCOUNTER SYMPTOMS
ABDOMINAL PAIN: 0
RHINORRHEA: 0
FACIAL SWELLING: 0
SORE THROAT: 0
COLOR CHANGE: 0
SHORTNESS OF BREATH: 0

## 2023-06-09 ASSESSMENT — PAIN - FUNCTIONAL ASSESSMENT: PAIN_FUNCTIONAL_ASSESSMENT: 0-10

## 2023-06-09 NOTE — ED NOTES
Patient moved to R6 and placed on cardiac monitor prior to nitro admin. Pt states worsening left chest pain, provider aware.      Joceline Noguera RN  06/09/23 2696

## 2023-06-09 NOTE — ED PROVIDER NOTES
on work-up in the emergency department and specialty consultation as well as current vital signs do feel patient is appropriate for discharge with strict ER return precautions and close specialty and primary care follow-up. FINAL IMPRESSION      1.  Chest pain, unspecified type             Hien Garcia MD  06/09/23 8411
Given 6/9/23 1419)            CONSULTS:  IP CONSULT TO CARDIOLOGY   Via phone      Discussion with other professionals - cardiology    Social determinants - none    Records Reviewed - none    History from - patient    Limitations to history- none    Chronic conditions: has a past medical history of Arthritis, Hepatitis C antibody positive in blood (08/23/2021), and Wears dentures. Is this patient to be included in the SEP-1 Core Measure due to severe sepsis or septic shock? No   Exclusion criteria - the patient is NOT to be included for SEP-1 Core Measure due to: Infection is not suspected         The patient tolerated their visit well. I have evaluated this patient. My supervising physician was available for consultation. The patient and / or the family were informed of the results of any tests, a time was given to answer questions, a plan was proposed and they agreed with plan. FINAL IMPRESSION      1. Chest pain, unspecified type    2. Anxiety          DISPOSITION/PLAN   DISPOSITION Decision To Discharge 06/09/2023 03:10:56 PM      PATIENT REFERRED TO:  ELBA Flores NP  [address] [place] [address] [address] [place] [address]  [phone]    Schedule an appointment as soon as possible for a visit in 2 days  for re-evaluation    [facility] ED  [address] [facility]  [phone]    If symptoms worsen    Annel Stockton MD  [address] [place] [address].   [facility]  [phone]            DISCHARGE MEDICATIONS:  Discharge Medication List as of 6/9/2023  3:25 PM        START taking these medications    Details   hydrOXYzine pamoate (VISTARIL) 25 MG capsule Take 1 capsule by mouth 3 times daily as needed for Itching, Disp-15 capsule, R-0Print             DISCONTINUED MEDICATIONS:  Discharge Medication List as of 6/9/2023  3:25 PM                 (Please note that portions of this note were completed with a voice recognition program.  Efforts were made to edit

## 2023-06-09 NOTE — TELEPHONE ENCOUNTER
Attempted to call patient to schedule new patient appointment at [facility]    Reason for Call: New Patient/New to Provider Appointment needed: Routine   ED Follow Up Visit     QUESTIONS     Reason for appointment request? Requested Provider unavailable - Diana Venegas       Additional Information for Provider? Pt is calling in wanting to establish   care and schedule an ED follow due to high bp. Pt was referred to see DR Nirav Mo.  Please advise.   ---------------------------------------------------------------------------   --------------   Carla Mcwilliams INFO   [phone]; Do not leave any message, patient will call back for answer   ---------------------------------------------------------------------------   --------------   SCRIPT ANSWERS

## 2023-06-09 NOTE — ED NOTES
1318-Call placed to Cardiology for consult placed by Robert Grant NP. Ti Hebert  06/09/23 1318  1327-Call back received from Dr. Kaylyn Shaver Cardiology spoke with Robert Grant NP regarding consult.       Ti Hebert  06/09/23 1322

## 2023-06-13 PROBLEM — R07.9 CHEST PAIN: Status: ACTIVE | Noted: 2023-04-03

## 2023-06-13 PROBLEM — Z87.891 HISTORY OF TOBACCO ABUSE: Status: ACTIVE | Noted: 2023-04-03

## 2023-06-15 PROBLEM — S61.452A DOG BITE OF LEFT HAND: Status: ACTIVE | Noted: 2022-08-03
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
## 2023-06-15 PROBLEM — S63.054A DISLOCATION OF CARPOMETACARPAL JOINT OF RIGHT HAND: Status: ACTIVE | Noted: 2022-08-03

## 2023-06-27 ENCOUNTER — TELEMEDICINE (OUTPATIENT)
Dept: FAMILY MEDICINE CLINIC | Age: 48
End: 2023-06-27
Payer: COMMERCIAL

## 2023-06-27 DIAGNOSIS — F41.9 ANXIETY: Primary | ICD-10-CM

## 2023-06-27 PROCEDURE — 99443 PR PHYS/QHP TELEPHONE EVALUATION 21-30 MIN: CPT | Performed by: STUDENT IN AN ORGANIZED HEALTH CARE EDUCATION/TRAINING PROGRAM

## 2023-06-27 RX ORDER — BUSPIRONE HYDROCHLORIDE 7.5 MG/1
7.5 TABLET ORAL 2 TIMES DAILY
Qty: 60 TABLET | Refills: 1 | Status: SHIPPED | OUTPATIENT
Start: 2023-06-27 | End: 2023-08-26

## 2023-06-27 SDOH — ECONOMIC STABILITY: FOOD INSECURITY: WITHIN THE PAST 12 MONTHS, THE FOOD YOU BOUGHT JUST DIDN'T LAST AND YOU DIDN'T HAVE MONEY TO GET MORE.: NEVER TRUE

## 2023-06-27 SDOH — ECONOMIC STABILITY: FOOD INSECURITY: WITHIN THE PAST 12 MONTHS, YOU WORRIED THAT YOUR FOOD WOULD RUN OUT BEFORE YOU GOT MONEY TO BUY MORE.: NEVER TRUE

## 2023-06-27 SDOH — ECONOMIC STABILITY: INCOME INSECURITY: HOW HARD IS IT FOR YOU TO PAY FOR THE VERY BASICS LIKE FOOD, HOUSING, MEDICAL CARE, AND HEATING?: NOT VERY HARD

## 2023-06-27 SDOH — ECONOMIC STABILITY: TRANSPORTATION INSECURITY
IN THE PAST 12 MONTHS, HAS LACK OF TRANSPORTATION KEPT YOU FROM MEETINGS, WORK, OR FROM GETTING THINGS NEEDED FOR DAILY LIVING?: NO

## 2023-06-27 SDOH — ECONOMIC STABILITY: HOUSING INSECURITY
IN THE LAST 12 MONTHS, WAS THERE A TIME WHEN YOU DID NOT HAVE A STEADY PLACE TO SLEEP OR SLEPT IN A SHELTER (INCLUDING NOW)?: NO

## 2023-07-10 ENCOUNTER — TELEPHONE (OUTPATIENT)
Dept: FAMILY MEDICINE CLINIC | Age: 48
End: 2023-07-10

## 2023-07-10 NOTE — TELEPHONE ENCOUNTER
Nurse Triage- [phone] Chest Pains for 2 months that comes & go, Left arm pinch nerve feeling that comes & goes.  Cardiologist Stress Test in April was all normal. Pt states he can only do this wednesday appt.  ---Scheduled  Virtual 7/12/23 appt with PCP

## 2023-07-12 ENCOUNTER — PATIENT MESSAGE (OUTPATIENT)
Dept: FAMILY MEDICINE CLINIC | Age: 48
End: 2023-07-12

## 2023-07-12 ENCOUNTER — TELEMEDICINE (OUTPATIENT)
Dept: FAMILY MEDICINE CLINIC | Age: 48
End: 2023-07-12
Payer: COMMERCIAL

## 2023-07-12 DIAGNOSIS — F41.9 ANXIETY: Primary | ICD-10-CM

## 2023-07-12 PROCEDURE — 99213 OFFICE O/P EST LOW 20 MIN: CPT | Performed by: STUDENT IN AN ORGANIZED HEALTH CARE EDUCATION/TRAINING PROGRAM

## 2023-07-12 RX ORDER — BUSPIRONE HYDROCHLORIDE 15 MG/1
15 TABLET ORAL 2 TIMES DAILY
Qty: 60 TABLET | Refills: 2 | Status: SHIPPED | OUTPATIENT
Start: 2023-07-12

## 2023-07-12 NOTE — PROGRESS NOTES
Darylene Dan (:  1975) is a Established patient, presenting virtually for evaluation of the following:    Assessment & Plan   Below is the assessment and plan developed based on review of pertinent history, physical exam, labs, studies, and medications. 1. Anxiety  -     busPIRone (BUSPAR) 15 MG tablet; Take 15 mg by mouth in the morning and at bedtime, Disp-60 tablet, R-2Normal  -     AFL - Deondre, Liv Gaston, JUAN, Counseling, [facility]  Chronic. Uncontrolled. Will increase BuSpar and refer to psychiatrist at patient request.  Patient will follow-up as needed for the above concern. No follow-ups on file. Subjective   HPI  Patient presents over the telephone for A/V communication to discuss anxiety. Patient had previously had chest pains that were evaluated by a cardiologist and were found to be unremarkable. Patient was told that his chest pain was related to anxiety and that getting the anxiety under control should improve his symptoms. Patient reports that he does not feel like his anxiety is yet under control. He had been on hydroxyzine previously which was not helping. He had been switched to BuSpar 7.5 mg twice daily which has been mildly helpful. However, patient still reports continued intermittent anxiety attacks. He continues to be on metoprolol and amlodipine for blood pressure. Patient is concerned about spikes in blood pressure during times of stress and that these blood pressures spikes will get worse if he gets more stressed about things. Discussed multiple options for what medications to go to, but patient verbalizes that he is extremely hesitant to try any SSRIs and refuses to try any of them because he has had family members have bad reactions to them in the past.  Patient also states that he would like to speak to a psychiatrist because he would like to investigate getting on other medication.   Patient has asked about clonazepam in the past.    Review of Systems

## 2023-07-13 DIAGNOSIS — F41.9 ANXIETY: Primary | ICD-10-CM

## 2023-07-27 ENCOUNTER — OFFICE VISIT (OUTPATIENT)
Dept: FAMILY MEDICINE CLINIC | Age: 48
End: 2023-07-27

## 2023-07-27 VITALS
HEART RATE: 85 BPM | DIASTOLIC BLOOD PRESSURE: 82 MMHG | WEIGHT: 204 LBS | BODY MASS INDEX: 29.2 KG/M2 | SYSTOLIC BLOOD PRESSURE: 124 MMHG | HEIGHT: 70 IN | OXYGEN SATURATION: 98 %

## 2023-07-27 DIAGNOSIS — M10.071 ACUTE IDIOPATHIC GOUT INVOLVING TOE OF RIGHT FOOT: Primary | ICD-10-CM

## 2023-07-27 RX ORDER — PREDNISONE 10 MG/1
TABLET ORAL
Qty: 45 TABLET | Refills: 0 | Status: SHIPPED | OUTPATIENT
Start: 2023-07-27 | End: 2023-08-09

## 2023-07-27 NOTE — PROGRESS NOTES
Conjunctivae normal.   Cardiovascular:      Rate and Rhythm: Normal rate. Pulmonary:      Effort: Pulmonary effort is normal.   Abdominal:      General: There is no distension. Musculoskeletal:      Comments: Patient with erythematous, edematous, and significantly tender base of his right toe   Skin:     General: Skin is warm and dry. Neurological:      Mental Status: He is alert. Mental status is at baseline. Gait: Gait abnormal (Antalgic). Psychiatric:         Behavior: Behavior normal.          On this date 7/27/2023 I have spent 20 minutes reviewing previous notes, test results and face to face with the patient discussing the diagnosis and importance of compliance with the treatment plan as well as documenting on the day of the visit. An electronic signature was used to authenticate this note.     --Brenna Rahman MD

## 2023-08-01 ENCOUNTER — OFFICE VISIT (OUTPATIENT)
Dept: FAMILY MEDICINE CLINIC | Age: 48
End: 2023-08-01
Payer: COMMERCIAL

## 2023-08-01 VITALS
HEART RATE: 95 BPM | SYSTOLIC BLOOD PRESSURE: 140 MMHG | DIASTOLIC BLOOD PRESSURE: 90 MMHG | BODY MASS INDEX: 29.7 KG/M2 | OXYGEN SATURATION: 98 % | WEIGHT: 207 LBS

## 2023-08-01 DIAGNOSIS — I10 PRIMARY HYPERTENSION: Primary | ICD-10-CM

## 2023-08-01 DIAGNOSIS — F41.9 ANXIETY: ICD-10-CM

## 2023-08-01 PROCEDURE — 3075F SYST BP GE 130 - 139MM HG: CPT | Performed by: NURSE PRACTITIONER

## 2023-08-01 PROCEDURE — 99214 OFFICE O/P EST MOD 30 MIN: CPT | Performed by: NURSE PRACTITIONER

## 2023-08-01 PROCEDURE — 3080F DIAST BP >= 90 MM HG: CPT | Performed by: NURSE PRACTITIONER

## 2023-08-01 RX ORDER — HYDROXYZINE HYDROCHLORIDE 10 MG/1
10 TABLET, FILM COATED ORAL 3 TIMES DAILY PRN
COMMUNITY

## 2023-08-01 RX ORDER — LISINOPRIL 10 MG/1
10 TABLET ORAL DAILY
Qty: 90 TABLET | Refills: 1 | Status: SHIPPED | OUTPATIENT
Start: 2023-08-01

## 2023-08-01 NOTE — PROGRESS NOTES
Ear: External ear normal.      Left Ear: External ear normal.      Nose: Nose normal.      Mouth/Throat:      Pharynx: No oropharyngeal exudate or posterior oropharyngeal erythema. Eyes:      Conjunctiva/sclera: Conjunctivae normal.   Cardiovascular:      Rate and Rhythm: Normal rate and regular rhythm. Heart sounds: Normal heart sounds. No murmur heard. Pulmonary:      Effort: Pulmonary effort is normal. No respiratory distress. Breath sounds: Normal breath sounds. No wheezing or rales. Musculoskeletal:         General: Normal range of motion. Cervical back: Normal range of motion. Lymphadenopathy:      Cervical: No cervical adenopathy. Skin:     General: Skin is warm and dry. Neurological:      General: No focal deficit present. Mental Status: He is alert and oriented to person, place, and time. Deep Tendon Reflexes: Reflexes are normal and symmetric. Psychiatric:         Mood and Affect: Mood normal.         Behavior: Behavior normal.         Thought Content: Thought content normal.         Judgment: Judgment normal.     Vitals:    08/01/23 1729   BP: (!) 140/90   Pulse:    SpO2:        Assessment:  Encounter Diagnoses   Name Primary? Primary hypertension Yes    Anxiety        Plan:  1. Primary hypertension  D/c norvasc  - lisinopril (PRINIVIL;ZESTRIL) 10 MG tablet; Take 1 tablet by mouth daily  Dispense: 90 tablet; Refill: 1    2. Anxiety  [facility] information given today  Discussed benzos not good treatment for ongoing anxiety      Return in about 1 month (around 9/1/2023) for f/u HTN with Dr. Tommy Bird.

## 2023-08-01 NOTE — PATIENT INSTRUCTIONS
Please make an appointment with one of our [address] [place] [address] Consultants, Dr. Osmany Peñaloza or Nolan Plata MA. They will work with you to develop a plan to improve your overall well-being by addressing any behavioral or mental health factors that are influencing your health. You can schedule your appointment just like you schedule your other appointments here, at the  or over the phone. Each appointment will be 30 minutes long, and you will typically be seen every 2-4 weeks. Your insurance should cover the visit, but you may owe a specialist copay. If you would prefer to be seen by a therapist more frequently, or for a longer visit, please ask your Primary Care Provider for a recommendation.

## 2023-08-04 ENCOUNTER — TELEPHONE (OUTPATIENT)
Dept: FAMILY MEDICINE CLINIC | Age: 48
End: 2023-08-04

## 2023-08-04 PROBLEM — F12.90 MARIJUANA USER: Status: RESOLVED | Noted: 2023-04-03 | Resolved: 2023-08-04

## 2023-08-04 NOTE — TELEPHONE ENCOUNTER
----- Message from Alyssia Wilkins sent at 8/4/2023 11:03 AM EDT -----  Subject: Medication Problem    Medication: lisinopril (PRINIVIL;ZESTRIL) 10 MG tablet  Dosage: daily  Ordering Provider: candy    Question/Problem: Would like to know if he can start talking his   amlodipine again; please call and advise. Patient missed his appt due to   family emergency.  Call and also send a message on [address] [facility]: [facility] [phone] [facility], [address] [facility]   [phone] - F [phone]    ---------------------------------------------------------------------------  --------------  Grace VILLAGRAN  [phone]; Do not leave any message, patient will call back for answer  ---------------------------------------------------------------------------  --------------    SCRIPT ANSWERS  Relationship to Patient: Self

## 2023-08-04 NOTE — TELEPHONE ENCOUNTER
According to patient's last office visit, he was discontinued on the amlodipine and was started on lisinopril. At this point patient should be on either 1 or the other and not both. So if patient prefers to stay on amlodipine, he can discontinue lisinopril. Patient prefers to stay on lisinopril he can discontinue the amlodipine.

## 2023-08-04 NOTE — TELEPHONE ENCOUNTER
OUTPATIENT PROGRESS NOTE     DATE OF SERVICE:  12/9/2020    PROBLEM LIST:  1. 11/12/2019 admitted to hospital for rectosigmoid colon cancer with involvement of bladder, terminal ileum, peritoneal carcinomatosis, liver lesions suspicious for metastasis. He was taken to the operating room for laparoscopic assisted low anterior resection with colostomy and en bloc small bowel resection, peritoneal biopsy x 2, and liver biopsy.  1. Pathology: Rectal cancer Stage IV (pT4, pN2b, pM1c); Invasive colorectal adenocarcinoma, poorly differentiated (6.8 cm). Carcinoma perforates the colon and infiltrates/adheres to small intestine. Extensive lymphovascular invasion is present. Invasive carcinoma is transected circumferentially at the distal surgical resection margin, at the cauterized soft tissue margin near the right anterior.   2. Blood in stool has been present since February 2019 with accompanied ~100 to ~160 pound weight loss.  2. 11/18/2019 CT AP W/CONTRAST: 1. No evidence of bowel obstruction status post left lower quadrant colostomy formation. There is a small amount of fluid adjacent to the rectal surgical staple line which likely represent postoperative seroma or hematoma; superimposed infection cannot be excluded. 2. Hypoattenuating regions within the inferior right hepatic lobe suspicious for metastatic disease; dedicated multiphasic contrast enhanced CT or MRI with liver protocol recommended for further characterization.  3. 12/4/2019: Patient initiated CAPOX  1. Oxaliplatin held 1/31/2020 due to cold intolerance. Resumed 3/17/2020 at 25% reduction.  4. 3/2020: CT CAP no new mets or developing lymphadenopathy. 2 hypo-enhancing hepatic lesions inferior right hepatic lobe comparable to prior.  5. 4/22/2020: PET scan showed ANABELLA  6. Patient completed treatment with CAPOX/Avastin around 6/3/2020 and transitioned to maintenance Avastin alone on 6/9/2020.     CHIEF COMPLAINT: Follow up for Rectal cancer stage IV to the  Pt called back stating he thinks he missed a call from Dr Amador Martinez because it came up private number, but it only rang once and then disconnected. Pt is calling to find out if he can start taking the amlodipine again. peritoneum, bladder and liver    SYMPTOMS: Patient arrives at the clinic today for follow up and possible continuation of Avastin. He is doing good. Sinus infection is improving, and notes phlegm production to be yellow in color. Blood from nose is also improving. Patient notes mild pain over left side which he feels is related to sleeping position. His appetite remains strong. Patient also gives increased brittleness to his fingernails. He denies other bleeding issues including in stool, abdominal pain, or back pain.     Due to patient currently being incarcerated, he was accompanied by a [facility] .     REVIEW OF SYSTEMS:   Detailed 10 point review of systems was performed which was negative other than the pertinent positives as discussed above.    ECOG PFS=0    ALLERGIES:   Allergen Reactions   • Penicillins SHORTNESS OF BREATH     Pt unsure of this allergy.   • Latex   (Environmental) RASH   • Ragweed Other (See Comments)     sinus congestion         Current Outpatient Medications   Medication Sig Dispense Refill   • potassium CHLORIDE (KLOR-CON) 20 MEQ packet Take 0.5 packets by mouth daily. 15 packet 0   • azithromycin (ZITHROMAX) 250 MG tablet Take 2 tablets today then 1 tablet daily for 4 days 6 tablet 0   • magnesium gluconate 500 (27 Mg) MG tablet Take 1 tablet by mouth 2 times daily. 60 tablet 3   • meclizine (ANTIVERT) 25 MG tablet Take 50 mg by mouth 2 times daily as needed.     • lisinopril-hydroCHLOROthiazide (ZESTORETIC) 10-12.5 MG per tablet Take 1 tablet by mouth daily. 30 tablet 11   • prochlorperazine (COMPAZINE) 10 MG tablet Take 1 tablet by mouth every 6 hours as needed for Nausea or Vomiting. 30 tablet 3   • ibuprofen (MOTRIN) 600 MG tablet Take 1 tablet by mouth every 6 hours as needed for Pain. 30 tablet 0   • acetaminophen (TYLENOL) 325 MG tablet Take 650 mg by mouth every 4 hours as needed for Pain.       No current facility-administered medications for this visit.       PHYSICAL EXAM:   GENERAL: The patient is sitting up in chair in no apparent distress.   Vitals:    12/09/20 1047 12/09/20 1059   Height: 6' 3.98\" (1.93 m)    PainSc:   0     EYES: SHANDRA, EOMI (Pupils equal, reactive to light and accommodation, extraocular movements intact). Conjunctival pallor is not present. Sclerae: No icterus.   ENT: No hearing deficit. Tongue: No masses. Uvula in midline. Tonsils: Unremarkable.   NECK: No thyromegaly.   LYMPHATIC: No lymphadenopathy in neck, axillary or inguinal areas.   PULMONARY: Clear vesicular breath sounds, no wheezing or crackles. No dullness on percussion.   CARDIOVASCULAR: S1, S2 heard, regular rhythm. Bradycardia noted. No mechanical sounds or murmurs noted. Apical pulse normal.   ABDOMEN: Soft, nontender. No masses are palpable. No liver/spleen palpable. Bowel sounds - 2 to 3 per minute. No ascites noted. Colostomy bag in place.   EXTREMITIES: Bilateral lower extremities - no edema is noted.   SKIN: No ulceration. No unusual masses. No unusual pigmentation.   CNS: Alert and oriented times 3. Grossly non-focal.   PSYCHIATRIC: Normal affect.      LABS:  Reviewed and confirmed in the EMR (Electronic Medical Record).    ASSESSMENT AND PLAN:  1. Invasive colorectal adenocarcinoma, poorly differentiated (6.8 cm). Stage IV (pT4, pN2b, pM1c). Carcinoma perforates the colon and infiltrates/adheres to small intestine. Extensive lymphovascular invasion present. Invasive carcinoma has been transected circumferentially at the distal surgical resection margin, at the cauterized soft tissue margin near the right anterior. Patient initiated CAPOX/Avastin q 3 weeks on 12/4/2019. CT CAP from 2/27/2020 which showed no new evidence of disease. PET scan from 4/22/2020 showed ANABELLA. Patient completed treatment with CAPOX/Avastin around 6/3/2020 and transitioned to maintenance Avastin alone on 6/9/2020. CT CAP from 8/12/20 showed disease stability. Avastin held on 9/15/2020 due to blood in  stool. EGD/Colonoscopy on 9/25/2020 removed polyp x1 with pathology showing a tubular adenoma. PET scan 10/22/2020 showed no recurrent or residual disease at this time.   All available labs from today (12/9/20) were reviewed. CBC is stable, and will notify patient when CMP is finalized. CEA was last noted at 3.4 on 11/19/2020 and he will be notified when today's result has finalized. Discussed holding off on follow up imaging as patient is doing well and has no symptoms suggesting disease progression. Will continue on maintenance of Avastin at this time. Patient instructed to contact the clinic with any questions or change in condition.Patient will return in about 3 weeks for follow up labs and possible treatment. Next imaging to be obtained 01/2021.   2. Hyperbilirubinemia grade 2, secondary to Cortland syndrome.   3. Hypertension. Patient is not currently taking Lisinopril/HCTZ.   4. Hypokalemia, ?secondary to loose stools. Advised continuing KCl 20 mEq po daily.    5. Sinus infection. Improving.     The above was discussed with the patient at length. He exhibits complete understanding of the above and agrees to proceed with the above plan of care. Due to patient currently being incarcerated, he was accompanied by a [facility] .      Denton Wyatt MD    FOLLOW UP:  Return in about 3 weeks (around 12/30/2020) for ML visit with cbc, comp, mg, CEA for Avastin  1. Avastin today.      This chart was documented by Rajesh Sullivan, acting as a scribe for Denton Wyatt MD. 12/9/2020, 11:10 AM.      The documentation recorded by the scribe accurately and completely reflects the service(s) I personally performed and the decisions made by me.   Denton Wyatt MD

## 2023-08-04 NOTE — TELEPHONE ENCOUNTER
Pt called wanting an update on his meds. ..what to do about taking the lisinopril and amlodipine? Pt was read the messages regarding this, but pt insists on a call from Dr. Martin Pena. Pt wants Dr. Martin Pena to talk to him about these meds and interactions with other meds.

## 2023-08-15 ENCOUNTER — HOSPITAL ENCOUNTER (OUTPATIENT)
Dept: CT IMAGING | Age: 48
Discharge: HOME | End: 2023-08-15
Attending: INTERNAL MEDICINE

## 2023-08-15 ENCOUNTER — TELEPHONE (OUTPATIENT)
Dept: CARDIOLOGY CLINIC | Age: 48
End: 2023-08-15

## 2023-08-15 DIAGNOSIS — R00.2 PALPITATIONS: ICD-10-CM

## 2023-08-15 DIAGNOSIS — R07.9 CHEST PAIN, UNSPECIFIED TYPE: Primary | ICD-10-CM

## 2023-08-15 DIAGNOSIS — R07.9 CHEST PAIN, UNSPECIFIED TYPE: ICD-10-CM

## 2023-08-15 NOTE — TELEPHONE ENCOUNTER
Pt called and stated not only did he need the medication for the test to be sent to pharmacy again, but also needs the order for CTA Cardiac W C STRC MORP W Contrast.  Please place order into Epic so pt can have test completed.

## 2023-08-15 NOTE — TELEPHONE ENCOUNTER
Pt was to have CTA preformed today but pt did not take medication as prescribed. Test canceled and will be rescheduled. Need another order for medication to be taken before testing.     Send to Ynuior Douglas [phone] Abebe Mckeon, [address] [place] [address] [phone] - [facility] [phone]

## 2023-08-28 ENCOUNTER — HOSPITAL ENCOUNTER (EMERGENCY)
Age: 48
Discharge: HOME | End: 2023-08-29
Attending: EMERGENCY MEDICINE
Payer: COMMERCIAL

## 2023-08-28 VITALS
OXYGEN SATURATION: 98 % | BODY MASS INDEX: 29.35 KG/M2 | DIASTOLIC BLOOD PRESSURE: 72 MMHG | TEMPERATURE: 98.2 F | RESPIRATION RATE: 16 BRPM | SYSTOLIC BLOOD PRESSURE: 122 MMHG | HEIGHT: 70 IN | HEART RATE: 81 BPM | WEIGHT: 205 LBS

## 2023-08-28 DIAGNOSIS — R07.81 RIB PAIN ON RIGHT SIDE: Primary | ICD-10-CM

## 2023-08-28 DIAGNOSIS — M25.512 LEFT SHOULDER PAIN, UNSPECIFIED CHRONICITY: ICD-10-CM

## 2023-08-28 DIAGNOSIS — S20.211A CONTUSION OF RIGHT CHEST WALL, INITIAL ENCOUNTER: ICD-10-CM

## 2023-08-28 PROCEDURE — 99283 EMERGENCY DEPT VISIT LOW MDM: CPT

## 2023-08-28 RX ORDER — LIDOCAINE 4 G/G
1 PATCH TOPICAL ONCE
Status: DISCONTINUED | OUTPATIENT
Start: 2023-08-29 | End: 2023-08-29 | Stop reason: HOSPADM

## 2023-08-28 RX ORDER — IBUPROFEN 800 MG/1
800 TABLET ORAL ONCE
Status: COMPLETED | OUTPATIENT
Start: 2023-08-29 | End: 2023-08-29

## 2023-08-28 ASSESSMENT — PAIN DESCRIPTION - ORIENTATION: ORIENTATION: RIGHT;LEFT

## 2023-08-28 ASSESSMENT — PAIN DESCRIPTION - LOCATION: LOCATION: RIB CAGE;SHOULDER

## 2023-08-28 ASSESSMENT — PAIN DESCRIPTION - FREQUENCY: FREQUENCY: CONTINUOUS

## 2023-08-28 ASSESSMENT — PAIN DESCRIPTION - PAIN TYPE: TYPE: ACUTE PAIN

## 2023-08-28 ASSESSMENT — PAIN - FUNCTIONAL ASSESSMENT: PAIN_FUNCTIONAL_ASSESSMENT: 0-10

## 2023-08-28 ASSESSMENT — PAIN DESCRIPTION - DESCRIPTORS: DESCRIPTORS: ACHING

## 2023-08-28 ASSESSMENT — PAIN SCALES - GENERAL: PAINLEVEL_OUTOF10: 8

## 2023-08-28 ASSESSMENT — PAIN DESCRIPTION - ONSET: ONSET: GRADUAL

## 2023-08-29 ENCOUNTER — APPOINTMENT (OUTPATIENT)
Dept: GENERAL RADIOLOGY | Age: 48
End: 2023-08-29
Payer: COMMERCIAL

## 2023-08-29 PROBLEM — M25.512 LEFT SHOULDER PAIN: Status: ACTIVE | Noted: 2023-08-29

## 2023-08-29 PROBLEM — S20.211A CONTUSION OF RIGHT CHEST WALL: Status: ACTIVE | Noted: 2023-08-29

## 2023-08-29 PROBLEM — R07.81 RIB PAIN ON RIGHT SIDE: Status: ACTIVE | Noted: 2023-08-29

## 2023-08-29 PROCEDURE — 6370000000 HC RX 637 (ALT 250 FOR IP): Performed by: EMERGENCY MEDICINE

## 2023-08-29 PROCEDURE — 71101 X-RAY EXAM UNILAT RIBS/CHEST: CPT

## 2023-08-29 PROCEDURE — 73030 X-RAY EXAM OF SHOULDER: CPT

## 2023-08-29 RX ORDER — IBUPROFEN 800 MG/1
800 TABLET ORAL EVERY 8 HOURS PRN
Qty: 30 TABLET | Refills: 0 | Status: SHIPPED | OUTPATIENT
Start: 2023-08-29

## 2023-08-29 RX ORDER — LIDOCAINE 50 MG/G
1 PATCH TOPICAL DAILY
Qty: 10 PATCH | Refills: 0 | Status: SHIPPED | OUTPATIENT
Start: 2023-08-29 | End: 2023-09-08

## 2023-08-29 RX ADMIN — IBUPROFEN 800 MG: 800 TABLET, FILM COATED ORAL at 00:12

## 2023-08-29 ASSESSMENT — PAIN SCALES - GENERAL
PAINLEVEL_OUTOF10: 6
PAINLEVEL_OUTOF10: 8

## 2023-08-29 NOTE — ED PROVIDER NOTES
[address] [address] ED  EMERGENCY DEPARTMENT ENCOUNTER      Pt Name: Enrike Griffin  MRN: [id number]  [address] [facility] 1975  Date of evaluation: 8/28/2023  Provider: Anthony Beck DO    CHIEF COMPLAINT       Chief Complaint   Patient presents with    Rib Pain     Pt states he dove for a ball, hit right ribs. Pt also states left shoulder injury from months ago         HISTORY OF PRESENT ILLNESS   (Location/Symptom, Timing/Onset, Context/Setting, Quality, Duration, Modifying Factors, Severity)  Note limiting factors. Enrike Griffin is a 56 y.o. male with past medical history of arthritis who presents to the emergency department accompanied by family bedside for chief complaint of rib pain. Patient states that he was playing baseball earlier tonight and he was in center field and he dove for a ball onto the grass and began experiencing right-sided rib pain. Patient denies hitting his head or loss of consciousness but he fell onto his right ribs. Patient states that since he dove for the ball he has had right-sided rib pain. Patient also states that he has had some mild shortness of breath. Patient denies any cigarette smoking. Patient has not taken anything for pain relief prior to emergency department arrival.  Patient is also complaining of left shoulder pain since diving for the ball and states that he has a pre-existing injury from 2 months ago when he played baseball that he never got checked out for. Patient is right-hand dominant. Patient denies any numbness and tingling. Patient denies any loss of consciousness, cough, difficulty breathing, fever, chills, chest pain, dizziness, headache, back pain, abdominal pain, and visual changes. Nursing Notes were reviewed. REVIEW OF SYSTEMS    (2-9 systems for level 4, 10 or more for level 5)     Review of Systems  CONSTITUTIONAL: no fever, no chills  HEAD: no headache, no dizziness, no visual changes, no loss of consciousness.   EYES: no eye

## 2023-09-08 ENCOUNTER — TELEPHONE (OUTPATIENT)
Dept: CARDIOLOGY CLINIC | Age: 48
End: 2023-09-08

## 2023-09-08 NOTE — TELEPHONE ENCOUNTER
Viviana from [facility] interventional radiology called and stated pt has canceled ct scan twice and he is now scheduled for a 3 rd time. Artemio Mclean states pt is scared to take metoprolol due to it lowering bp to much and he isnt compliant w/ instructions for testing.  Viviana requesting another script of metoprolol be sent to [facility] [id number] [facility], [address] [phone] [place] [phone]   [address] [place] [address], [address] [address]   Phone:  [phone]  Fax:  [phone]    Pt is now scheduled for CT on 09/26

## 2023-09-11 NOTE — TELEPHONE ENCOUNTER
All we can do is encourage him to have testing and follow instructions for meds. If not willing to do so then that is his prerogative.

## 2023-09-22 ENCOUNTER — TELEPHONE (OUTPATIENT)
Dept: INTERVENTIONAL RADIOLOGY/VASCULAR | Age: 48
End: 2023-09-22

## 2023-09-22 NOTE — TELEPHONE ENCOUNTER
Spoke to pt's wife regarding upcoming CTA.  Reviewed appropriate use of medication and provided education regarding test.

## 2023-09-26 ENCOUNTER — TELEPHONE (OUTPATIENT)
Dept: CARDIOLOGY CLINIC | Age: 48
End: 2023-09-26

## 2023-09-26 ENCOUNTER — HOSPITAL ENCOUNTER (OUTPATIENT)
Dept: CT IMAGING | Age: 48
Discharge: HOME | End: 2023-09-26
Payer: COMMERCIAL

## 2023-09-26 DIAGNOSIS — R00.2 PALPITATIONS: ICD-10-CM

## 2023-09-26 DIAGNOSIS — R07.9 CHEST PAIN, UNSPECIFIED TYPE: ICD-10-CM

## 2023-09-26 PROCEDURE — 6360000004 HC RX CONTRAST MEDICATION: Performed by: INTERNAL MEDICINE

## 2023-09-26 PROCEDURE — 75574 CT ANGIO HRT W/3D IMAGE: CPT

## 2023-09-26 RX ADMIN — IOPAMIDOL 110 ML: 755 INJECTION, SOLUTION INTRAVENOUS at 09:09

## 2023-09-26 NOTE — TELEPHONE ENCOUNTER
He can follow home BP readings off medication to see how BP responds. Goal 130/80 or less. Would advise getting arm BP machine and having it checked for accuracy at PCP office or local fire department. If BP remains at goal then would not favor anti-HTN meds. If BP consistently above goal then would go back on lisinopril. He can discuss with PCP and f/u with her. No further cardiac testing warranted. F/U with PCP only.

## 2023-09-26 NOTE — TELEPHONE ENCOUNTER
Pt called back with questions. Pt stated that what started \"all this\" was left chest pain, high BP, and then placed on Amlodipine. Amlodipine was changed to lisinopril. No more high BP episodes since the changes. Pt wants to know what would've caused the high BP episodes, and would like to know if he needs to continue taking the blood pressure medications. SMM please advise.

## 2023-09-26 NOTE — TELEPHONE ENCOUNTER
----- Message from Renata Mesa MD sent at 9/26/2023  3:27 PM EDT -----  Good news. Cardiac CTA without any evidence for heart artery blockages and calcium score 0 which is very good. I do not believe his CP or shoulder pain in related to heart disease. I would recommend he see ortho surgeon for shoulder pain and can contact PCP for referral. No need for further cardiac w/u. F/U only PRN.

## 2023-10-21 PROCEDURE — 96375 TX/PRO/DX INJ NEW DRUG ADDON: CPT

## 2023-10-21 PROCEDURE — 96374 THER/PROPH/DIAG INJ IV PUSH: CPT

## 2023-10-21 PROCEDURE — 99284 EMERGENCY DEPT VISIT MOD MDM: CPT

## 2023-10-22 ENCOUNTER — HOSPITAL ENCOUNTER (EMERGENCY)
Age: 48
Discharge: HOME | End: 2023-10-22
Attending: EMERGENCY MEDICINE
Payer: COMMERCIAL

## 2023-10-22 ENCOUNTER — APPOINTMENT (OUTPATIENT)
Dept: CT IMAGING | Age: 48
End: 2023-10-22
Payer: COMMERCIAL

## 2023-10-22 VITALS
TEMPERATURE: 98.7 F | BODY MASS INDEX: 28.63 KG/M2 | HEIGHT: 70 IN | SYSTOLIC BLOOD PRESSURE: 117 MMHG | RESPIRATION RATE: 16 BRPM | DIASTOLIC BLOOD PRESSURE: 74 MMHG | WEIGHT: 200 LBS | OXYGEN SATURATION: 91 % | HEART RATE: 76 BPM

## 2023-10-22 DIAGNOSIS — K62.5 RECTAL BLEEDING: Primary | ICD-10-CM

## 2023-10-22 LAB
ALBUMIN SERPL-MCNC: 4.5 G/DL (ref 3.4–5)
ALBUMIN/GLOB SERPL: 1.5 {RATIO} (ref 1.1–2.2)
ALP SERPL-CCNC: 42 U/L (ref 40–129)
ALT SERPL-CCNC: 33 U/L (ref 10–40)
ANION GAP SERPL CALCULATED.3IONS-SCNC: 11 MMOL/L (ref 3–16)
AST SERPL-CCNC: 28 U/L (ref 15–37)
BASOPHILS # BLD: 0 K/UL (ref 0–0.2)
BASOPHILS NFR BLD: 0.2 %
BILIRUB SERPL-MCNC: 1.1 MG/DL (ref 0–1)
BILIRUB UR QL STRIP.AUTO: NEGATIVE
BUN SERPL-MCNC: 12 MG/DL (ref 7–20)
CALCIUM SERPL-MCNC: 9.2 MG/DL (ref 8.3–10.6)
CHLORIDE SERPL-SCNC: 100 MMOL/L (ref 99–110)
CLARITY UR: CLEAR
CO2 SERPL-SCNC: 22 MMOL/L (ref 21–32)
COLOR UR: YELLOW
CREAT SERPL-MCNC: 0.8 MG/DL (ref 0.9–1.3)
DEPRECATED RDW RBC AUTO: 13.3 % (ref 12.4–15.4)
EOSINOPHIL # BLD: 0.1 K/UL (ref 0–0.6)
EOSINOPHIL NFR BLD: 0.8 %
GFR SERPLBLD CREATININE-BSD FMLA CKD-EPI: >60 ML/MIN/{1.73_M2}
GLUCOSE SERPL-MCNC: 117 MG/DL (ref 70–99)
GLUCOSE UR STRIP.AUTO-MCNC: NEGATIVE MG/DL
HCT VFR BLD AUTO: 48.1 % (ref 40.5–52.5)
HEMOCCULT STL QL: NORMAL
HGB BLD-MCNC: 16.5 G/DL (ref 13.5–17.5)
HGB UR QL STRIP.AUTO: NEGATIVE
KETONES UR STRIP.AUTO-MCNC: NEGATIVE MG/DL
LACTATE BLDV-SCNC: 1.1 MMOL/L (ref 0.4–2)
LEUKOCYTE ESTERASE UR QL STRIP.AUTO: NEGATIVE
LIPASE SERPL-CCNC: 46 U/L (ref 13–60)
LYMPHOCYTES # BLD: 1.7 K/UL (ref 1–5.1)
LYMPHOCYTES NFR BLD: 16.7 %
MCH RBC QN AUTO: 30.8 PG (ref 26–34)
MCHC RBC AUTO-ENTMCNC: 34.4 G/DL (ref 31–36)
MCV RBC AUTO: 89.5 FL (ref 80–100)
MONOCYTES # BLD: 1 K/UL (ref 0–1.3)
MONOCYTES NFR BLD: 9.9 %
NEUTROPHILS # BLD: 7.3 K/UL (ref 1.7–7.7)
NEUTROPHILS NFR BLD: 72.4 %
NITRITE UR QL STRIP.AUTO: NEGATIVE
PH UR STRIP.AUTO: 6 [PH] (ref 5–8)
PLATELET # BLD AUTO: 207 K/UL (ref 135–450)
PMV BLD AUTO: 8.9 FL (ref 5–10.5)
POTASSIUM SERPL-SCNC: 3.6 MMOL/L (ref 3.5–5.1)
PROT SERPL-MCNC: 7.5 G/DL (ref 6.4–8.2)
PROT UR STRIP.AUTO-MCNC: NEGATIVE MG/DL
RBC # BLD AUTO: 5.37 M/UL (ref 4.2–5.9)
SODIUM SERPL-SCNC: 133 MMOL/L (ref 136–145)
SP GR UR STRIP.AUTO: <=1.005 (ref 1–1.03)
UA COMPLETE W REFLEX CULTURE PNL UR: NORMAL
UA DIPSTICK W REFLEX MICRO PNL UR: NORMAL
URN SPEC COLLECT METH UR: NORMAL
UROBILINOGEN UR STRIP-ACNC: 0.2 E.U./DL
WBC # BLD AUTO: 10.1 K/UL (ref 4–11)

## 2023-10-22 PROCEDURE — 85025 COMPLETE CBC W/AUTO DIFF WBC: CPT

## 2023-10-22 PROCEDURE — 81003 URINALYSIS AUTO W/O SCOPE: CPT

## 2023-10-22 PROCEDURE — 82270 OCCULT BLOOD FECES: CPT

## 2023-10-22 PROCEDURE — 80053 COMPREHEN METABOLIC PANEL: CPT

## 2023-10-22 PROCEDURE — 2580000003 HC RX 258: Performed by: EMERGENCY MEDICINE

## 2023-10-22 PROCEDURE — 74176 CT ABD & PELVIS W/O CONTRAST: CPT

## 2023-10-22 PROCEDURE — 96374 THER/PROPH/DIAG INJ IV PUSH: CPT

## 2023-10-22 PROCEDURE — 83605 ASSAY OF LACTIC ACID: CPT

## 2023-10-22 PROCEDURE — 96375 TX/PRO/DX INJ NEW DRUG ADDON: CPT

## 2023-10-22 PROCEDURE — 6360000002 HC RX W HCPCS: Performed by: EMERGENCY MEDICINE

## 2023-10-22 PROCEDURE — 36415 COLL VENOUS BLD VENIPUNCTURE: CPT

## 2023-10-22 PROCEDURE — 83690 ASSAY OF LIPASE: CPT

## 2023-10-22 RX ORDER — KETOROLAC TROMETHAMINE 15 MG/ML
15 INJECTION, SOLUTION INTRAMUSCULAR; INTRAVENOUS ONCE
Status: COMPLETED | OUTPATIENT
Start: 2023-10-22 | End: 2023-10-22

## 2023-10-22 RX ORDER — ONDANSETRON 2 MG/ML
4 INJECTION INTRAMUSCULAR; INTRAVENOUS ONCE
Status: COMPLETED | OUTPATIENT
Start: 2023-10-22 | End: 2023-10-22

## 2023-10-22 RX ORDER — 0.9 % SODIUM CHLORIDE 0.9 %
1000 INTRAVENOUS SOLUTION INTRAVENOUS ONCE
Status: COMPLETED | OUTPATIENT
Start: 2023-10-22 | End: 2023-10-22

## 2023-10-22 RX ADMIN — SODIUM CHLORIDE 1000 ML: 9 INJECTION, SOLUTION INTRAVENOUS at 01:34

## 2023-10-22 RX ADMIN — ONDANSETRON 4 MG: 2 INJECTION INTRAMUSCULAR; INTRAVENOUS at 01:34

## 2023-10-22 RX ADMIN — KETOROLAC TROMETHAMINE 15 MG: 15 INJECTION, SOLUTION INTRAMUSCULAR; INTRAVENOUS at 01:34

## 2023-10-22 ASSESSMENT — ENCOUNTER SYMPTOMS
ALLERGIC/IMMUNOLOGIC NEGATIVE: 1
EYES NEGATIVE: 1
RESPIRATORY NEGATIVE: 1
ABDOMINAL PAIN: 1

## 2023-10-22 ASSESSMENT — LIFESTYLE VARIABLES: HOW OFTEN DO YOU HAVE A DRINK CONTAINING ALCOHOL: NEVER

## 2023-10-22 NOTE — ED TRIAGE NOTES
Pt started with diarrhea this am then felt very fatigued. Then started with fever and diarrhea recurred with blood in stool. Pt now C/O flank pain off and on.

## 2023-10-22 NOTE — ED PROVIDER NOTES
Index  [AM] Maite Lal MD         CRITICAL CARE TIME   None    CONSULTS:  None    PROCEDURES:  Unless otherwise noted below, none     Procedures      FINAL IMPRESSION      1. Rectal bleeding          DISPOSITION/PLAN   DISPOSITION Decision To Discharge 10/22/2023 03:55:25 AM      PATIENT REFERRED TO:  Chaka Hankins, [address]  [phone]    Schedule an appointment as soon as possible for a visit in 3 days        DISCHARGE MEDICATIONS:  New Prescriptions    No medications on file     Controlled Substances Monitoring:     RX Monitoring Periodic Controlled Substance Monitoring   6/13/2015   8:11 PM No signs of potential drug abuse or diversion identified.        (Please note that portions of this note were completed with a voice recognition program.  Efforts were made to edit the dictations but occasionally words are mis-transcribed.)    Maite Lal MD (electronically signed)  Attending Emergency Physician            Maite Lal MD  10/22/23 [id number]

## 2023-11-22 ENCOUNTER — APPOINTMENT (OUTPATIENT)
Dept: GENERAL RADIOLOGY | Age: 48
End: 2023-11-22

## 2023-11-22 ENCOUNTER — HOSPITAL ENCOUNTER (EMERGENCY)
Age: 48
Discharge: HOME | End: 2023-11-22

## 2023-11-22 VITALS
RESPIRATION RATE: 18 BRPM | DIASTOLIC BLOOD PRESSURE: 91 MMHG | BODY MASS INDEX: 30.29 KG/M2 | HEIGHT: 70 IN | TEMPERATURE: 99.4 F | WEIGHT: 211.6 LBS | HEART RATE: 99 BPM | OXYGEN SATURATION: 94 % | SYSTOLIC BLOOD PRESSURE: 144 MMHG

## 2023-11-22 DIAGNOSIS — J06.9 VIRAL URI: Primary | ICD-10-CM

## 2023-11-22 LAB
FLUAV RNA RESP QL NAA+PROBE: NOT DETECTED
FLUBV RNA RESP QL NAA+PROBE: NOT DETECTED
RSV AG NOSE QL: NEGATIVE
SARS-COV-2 RNA RESP QL NAA+PROBE: NOT DETECTED

## 2023-11-22 PROCEDURE — 99284 EMERGENCY DEPT VISIT MOD MDM: CPT

## 2023-11-22 PROCEDURE — 6370000000 HC RX 637 (ALT 250 FOR IP): Performed by: PHYSICIAN ASSISTANT

## 2023-11-22 PROCEDURE — 87807 RSV ASSAY W/OPTIC: CPT

## 2023-11-22 PROCEDURE — 71046 X-RAY EXAM CHEST 2 VIEWS: CPT

## 2023-11-22 PROCEDURE — 87636 SARSCOV2 & INF A&B AMP PRB: CPT

## 2023-11-22 RX ORDER — IBUPROFEN 600 MG/1
600 TABLET ORAL ONCE
Status: COMPLETED | OUTPATIENT
Start: 2023-11-22 | End: 2023-11-22

## 2023-11-22 RX ORDER — KETOROLAC TROMETHAMINE 15 MG/ML
30 INJECTION, SOLUTION INTRAMUSCULAR; INTRAVENOUS ONCE
Status: DISCONTINUED | OUTPATIENT
Start: 2023-11-22 | End: 2023-11-22

## 2023-11-22 RX ADMIN — IBUPROFEN 600 MG: 600 TABLET, FILM COATED ORAL at 01:40

## 2023-11-22 ASSESSMENT — PAIN SCALES - GENERAL
PAINLEVEL_OUTOF10: 6
PAINLEVEL_OUTOF10: 6

## 2023-11-22 ASSESSMENT — PAIN DESCRIPTION - LOCATION
LOCATION: HEAD;CHEST
LOCATION: HEAD;CHEST

## 2023-11-22 ASSESSMENT — PAIN DESCRIPTION - ORIENTATION: ORIENTATION: LEFT

## 2023-11-22 ASSESSMENT — PAIN - FUNCTIONAL ASSESSMENT
PAIN_FUNCTIONAL_ASSESSMENT: 0-10
PAIN_FUNCTIONAL_ASSESSMENT: NONE - DENIES PAIN

## 2023-11-22 ASSESSMENT — PAIN DESCRIPTION - DESCRIPTORS: DESCRIPTORS: DISCOMFORT

## 2023-11-22 NOTE — ED PROVIDER NOTES
INDOMETHACIN (INDOCIN) 50 MG CAPSULE    Take 1 capsule by mouth 3 times daily for 7 days    LISINOPRIL (PRINIVIL;ZESTRIL) 10 MG TABLET    Take 1 tablet by mouth daily    OMEPRAZOLE (PRILOSEC) 40 MG DELAYED RELEASE CAPSULE    Take 1 capsule by mouth every morning (before breakfast)       ALLERGIES     Banana    FAMILYHISTORY       Family History   Problem Relation Age of Onset    Diabetes Mother     High Blood Pressure Father     Atrial Fibrillation Father         SOCIAL HISTORY       Social History     Tobacco Use    Smoking status: Former     Packs/day: .5     Types: Cigarettes     Quit date: 2012     Years since quittin.8    Smokeless tobacco: Current     Types: Chew   Vaping Use    Vaping Use: Former   Substance Use Topics    Alcohol use: Never     Comment: 2-3 times a year    Drug use: Not Currently     Types: Marijuana (Weed)     Comment: TO HELP WITH PAIN -LAST 10/31/22       SCREENINGS                         CIWA Assessment  BP: (!) 139/98  Pulse: 100           PHYSICAL EXAM  1 or more Elements     ED Triage Vitals [23 0019]   BP Temp Temp src Pulse Respirations SpO2 Height Weight - Scale   (!) 139/98 -- -- 100 16 95 % 1.778 m (5' 10\") 96 kg (211 lb 9.6 oz)       Physical Exam  Constitutional:       General: He is not in acute distress. Appearance: Normal appearance. He is not ill-appearing. HENT:      Head: Normocephalic and atraumatic. Eyes:      General: No scleral icterus. Extraocular Movements: Extraocular movements intact. Conjunctiva/sclera: Conjunctivae normal.   Cardiovascular:      Rate and Rhythm: Normal rate and regular rhythm. Heart sounds: Normal heart sounds. Pulmonary:      Effort: Pulmonary effort is normal. No respiratory distress. Breath sounds: Normal breath sounds. Musculoskeletal:      Cervical back: Normal range of motion and neck supple. Skin:     General: Skin is warm and dry. Findings: No rash.    Neurological:      General: No

## 2024-09-24 ENCOUNTER — HOSPITAL ENCOUNTER (EMERGENCY)
Age: 49
Discharge: HOME | End: 2024-09-24
Payer: COMMERCIAL

## 2024-09-24 VITALS
TEMPERATURE: 97.1 F | RESPIRATION RATE: 13 BRPM | WEIGHT: 220 LBS | HEIGHT: 70 IN | DIASTOLIC BLOOD PRESSURE: 81 MMHG | OXYGEN SATURATION: 99 % | HEART RATE: 77 BPM | BODY MASS INDEX: 31.5 KG/M2 | SYSTOLIC BLOOD PRESSURE: 139 MMHG

## 2024-09-24 DIAGNOSIS — T15.91XA FOREIGN BODY OF RIGHT EXTERNAL EYE, INITIAL ENCOUNTER: Primary | ICD-10-CM

## 2024-09-24 PROCEDURE — 99283 EMERGENCY DEPT VISIT LOW MDM: CPT

## 2024-09-24 PROCEDURE — 6370000000 HC RX 637 (ALT 250 FOR IP)

## 2024-09-24 RX ORDER — POLYMYXIN B SULFATE AND TRIMETHOPRIM 1; 10000 MG/ML; [USP'U]/ML
1 SOLUTION OPHTHALMIC ONCE
Status: COMPLETED | OUTPATIENT
Start: 2024-09-24 | End: 2024-09-24

## 2024-09-24 RX ORDER — TETRACAINE HYDROCHLORIDE 5 MG/ML
1 SOLUTION OPHTHALMIC ONCE
Status: COMPLETED | OUTPATIENT
Start: 2024-09-24 | End: 2024-09-24

## 2024-09-24 RX ADMIN — POLYMYXIN B SULFATE AND TRIMETHOPRIM SULFATE 1 DROP: 10000; 1 SOLUTION/ DROPS OPHTHALMIC at 21:39

## 2024-09-24 RX ADMIN — TETRACAINE HYDROCHLORIDE 1 DROP: 5 SOLUTION OPHTHALMIC at 21:39

## 2024-09-24 RX ADMIN — FLUORESCEIN SODIUM 1 MG: 1 STRIP OPHTHALMIC at 21:39

## 2024-09-24 ASSESSMENT — ENCOUNTER SYMPTOMS
EYE REDNESS: 1
EYE PAIN: 1
EYE ITCHING: 0
EYE DISCHARGE: 0
PHOTOPHOBIA: 0

## 2024-09-24 ASSESSMENT — PAIN DESCRIPTION - ORIENTATION: ORIENTATION: RIGHT

## 2024-09-24 ASSESSMENT — PAIN SCALES - GENERAL: PAINLEVEL_OUTOF10: 6

## 2024-09-24 ASSESSMENT — VISUAL ACUITY
OD: 15/20
OS: 13/20
OU: 20/20

## 2024-09-24 ASSESSMENT — PAIN - FUNCTIONAL ASSESSMENT: PAIN_FUNCTIONAL_ASSESSMENT: 0-10

## 2024-09-24 ASSESSMENT — PAIN DESCRIPTION - LOCATION: LOCATION: EYE

## 2024-10-14 ENCOUNTER — HOSPITAL ENCOUNTER (EMERGENCY)
Age: 49
Discharge: HOME | End: 2024-10-14
Attending: EMERGENCY MEDICINE
Payer: COMMERCIAL

## 2024-10-14 VITALS
DIASTOLIC BLOOD PRESSURE: 74 MMHG | RESPIRATION RATE: 18 BRPM | TEMPERATURE: 98 F | HEART RATE: 65 BPM | OXYGEN SATURATION: 99 % | SYSTOLIC BLOOD PRESSURE: 125 MMHG

## 2024-10-14 DIAGNOSIS — H18.891 CORNEAL IRRITATION OF RIGHT EYE: Primary | ICD-10-CM

## 2024-10-14 PROCEDURE — 6370000000 HC RX 637 (ALT 250 FOR IP)

## 2024-10-14 PROCEDURE — 99283 EMERGENCY DEPT VISIT LOW MDM: CPT

## 2024-10-14 RX ORDER — CIPROFLOXACIN HYDROCHLORIDE 3.5 MG/ML
1 SOLUTION/ DROPS TOPICAL
Qty: 10 ML | Refills: 0 | Status: SHIPPED | OUTPATIENT
Start: 2024-10-14 | End: 2024-10-21

## 2024-10-14 RX ORDER — TETRACAINE HYDROCHLORIDE 5 MG/ML
1 SOLUTION OPHTHALMIC ONCE
Status: COMPLETED | OUTPATIENT
Start: 2024-10-14 | End: 2024-10-14

## 2024-10-14 RX ADMIN — FLUORESCEIN SODIUM 1 MG: 1 STRIP OPHTHALMIC at 13:27

## 2024-10-14 RX ADMIN — TETRACAINE HYDROCHLORIDE 1 DROP: 5 SOLUTION OPHTHALMIC at 13:27

## 2024-10-14 ASSESSMENT — PAIN SCALES - GENERAL: PAINLEVEL_OUTOF10: 10

## 2024-10-14 ASSESSMENT — VISUAL ACUITY
OU: 1
OD: 20/20
OS: 20/20
OU: 20/20

## 2024-10-14 ASSESSMENT — PAIN - FUNCTIONAL ASSESSMENT: PAIN_FUNCTIONAL_ASSESSMENT: 0-10

## 2024-10-14 NOTE — ED PROVIDER NOTES
[facility] ED  EMERGENCY DEPARTMENT ENCOUNTER        Pt Name: Thee Sanderson  MRN: [id number]  Birthdate 1975  Date of evaluation: 10/14/2024  Provider: CHERYL Oakes  PCP: Karmen Lantigua PA  Note Started: 12:59 PM EDT 10/14/24       I have seen and evaluated this patient with my supervising physician Dr. Curry       CHIEF COMPLAINT       Chief Complaint   Patient presents with    Eye Problem     Pt states he had metal in his right eye that was removed a few weeks ago and he endorses intermittent irritation in the same eye.        HISTORY OF PRESENT ILLNESS: 1 or more Elements     History From: Patient    Limitations to history : None    Social Determinants Significantly Affecting Health : None    Chief Complaint: Eye problem    Thee Sanderson is a 49 y.o. male who presents to the emergency department for foreign body sensation in eye.  Patient states that he was here a few weeks ago and they found metal in his eye which was removed.  States that afterwards he used antibiotic drops for about a week and then discontinued using them.  States that since then he has intermittently felt like he still has something in his eye.  States that after rubbing his eye he be able to get the sensation to go away however since Friday is came back and he has been unable to get it to be alleviated.  States that he is using eye flushes without any relief.  Denies blurred vision.  States that he did not follow-up with his ophthalmologist because he had just seen him prior to getting the metal in his eye.  Denies eye pain, swelling, redness, or drainage.  Denies fever or chills.    Nursing Notes were all reviewed and agreed with or any disagreements were addressed in the HPI.    REVIEW OF SYSTEMS :      Review of Systems    Positives and Pertinent negatives as per HPI.     SURGICAL HISTORY     Past Surgical History:   Procedure Laterality Date    ANKLE SURGERY      COLONOSCOPY N/A 11/01/2022

## 2024-10-14 NOTE — ED PROVIDER NOTES
I have personally performed a face to face diagnostic evaluation on this patient. I have fully participated in the care of this patient I personally saw the patient and performed a substantive portion of the visit including all aspects of the medical decision making.  I have reviewed and agree with all pertinent clinical information including history, physical exam, diagnostic tests, and the plan.      HPI: Thee Sanderson presented with concern for right eye problem had metal in his right eye that was removed several weeks ago.  Patient states this feels very similar.  Believes is a soft foreign body in his eyelid but nothing seen at this time.  Vision is intact.  No pain with extraocular motion.  Just feels like there is something foreign in his eye.  No fevers or chills no discharge from the eye.  No redness.  Chief Complaint   Patient presents with    Eye Problem     Pt states he had metal in his right eye that was removed a few weeks ago and he endorses intermittent irritation in the same eye.       Review of Systems: See CAREY note  Vital Signs: /82   Pulse 73   Temp 98 °F (36.7 °C)   Resp 16   SpO2 100%     Alert 49 y.o. male who does not appear toxic or acutely ill  HENT: Atraumatic, oral mucosa moist  Eye: Pupillary exam is unremarkable no hyphema, extraocular motion is intact, evaluated with fluorescein and tetracaine, no fluorescein uptake no conjunctival irritation or injection, vision is normal, no foreign body noted  Neck: Grossly normal ROM  Chest/Lungs: respiratory effort normal   Musculoskeletal: Grossly normal ROM  Skin: No palor or diaphoresis    Medical Decision Making and Plan:  Pertinent Labs & Imaging studies reviewed. (See CAREY chart for details)  I agree with CAREY assessment and plan.  49-year-old male presents with foreign body sensation to the right eye.  Recently had a foreign body removed from his right eye.  There is no foreign body appreciated at this time no fluorescein uptake

## 2024-10-14 NOTE — DISCHARGE INSTRUCTIONS
Please use the antibiotic eyedrops as prescribed.  Please follow-up with [facility] for close follow-up.  Return to this department if you develop new or worsening symptoms.

## 2025-02-27 ENCOUNTER — APPOINTMENT (OUTPATIENT)
Dept: CT IMAGING | Age: 50
End: 2025-02-27
Payer: COMMERCIAL

## 2025-02-27 ENCOUNTER — HOSPITAL ENCOUNTER (EMERGENCY)
Age: 50
Discharge: HOME | End: 2025-02-27
Attending: EMERGENCY MEDICINE
Payer: COMMERCIAL

## 2025-02-27 VITALS
DIASTOLIC BLOOD PRESSURE: 86 MMHG | BODY MASS INDEX: 33.07 KG/M2 | SYSTOLIC BLOOD PRESSURE: 132 MMHG | TEMPERATURE: 98.1 F | HEART RATE: 85 BPM | WEIGHT: 231 LBS | HEIGHT: 70 IN | RESPIRATION RATE: 20 BRPM | OXYGEN SATURATION: 98 %

## 2025-02-27 DIAGNOSIS — R10.9 FLANK PAIN: Primary | ICD-10-CM

## 2025-02-27 LAB
ALBUMIN SERPL-MCNC: 4.1 G/DL (ref 3.4–5)
ALBUMIN/GLOB SERPL: 1.6 {RATIO} (ref 1.1–2.2)
ALP SERPL-CCNC: 42 U/L (ref 40–129)
ALT SERPL-CCNC: 62 U/L (ref 10–40)
ANION GAP SERPL CALCULATED.3IONS-SCNC: 12 MMOL/L (ref 3–16)
AST SERPL-CCNC: 46 U/L (ref 15–37)
BASOPHILS # BLD: 0 K/UL (ref 0–0.2)
BASOPHILS NFR BLD: 0.3 %
BILIRUB SERPL-MCNC: 0.5 MG/DL (ref 0–1)
BILIRUB UR QL STRIP.AUTO: NEGATIVE
BUN SERPL-MCNC: 14 MG/DL (ref 7–20)
CALCIUM SERPL-MCNC: 8.5 MG/DL (ref 8.3–10.6)
CHLORIDE SERPL-SCNC: 101 MMOL/L (ref 99–110)
CLARITY UR: CLEAR
CO2 SERPL-SCNC: 22 MMOL/L (ref 21–32)
COLOR UR: YELLOW
CREAT SERPL-MCNC: 0.8 MG/DL (ref 0.9–1.3)
DEPRECATED RDW RBC AUTO: 13.3 % (ref 12.4–15.4)
EOSINOPHIL # BLD: 0.3 K/UL (ref 0–0.6)
EOSINOPHIL NFR BLD: 3.1 %
GFR SERPLBLD CREATININE-BSD FMLA CKD-EPI: >90 ML/MIN/{1.73_M2}
GLUCOSE SERPL-MCNC: 150 MG/DL (ref 70–99)
GLUCOSE UR STRIP.AUTO-MCNC: NEGATIVE MG/DL
HCT VFR BLD AUTO: 45.3 % (ref 40.5–52.5)
HGB BLD-MCNC: 15.8 G/DL (ref 13.5–17.5)
HGB UR QL STRIP.AUTO: NEGATIVE
KETONES UR STRIP.AUTO-MCNC: NEGATIVE MG/DL
LEUKOCYTE ESTERASE UR QL STRIP.AUTO: NEGATIVE
LIPASE SERPL-CCNC: 72 U/L (ref 13–60)
LYMPHOCYTES # BLD: 3.8 K/UL (ref 1–5.1)
LYMPHOCYTES NFR BLD: 43.5 %
MCH RBC QN AUTO: 31.5 PG (ref 26–34)
MCHC RBC AUTO-ENTMCNC: 34.9 G/DL (ref 31–36)
MCV RBC AUTO: 90.3 FL (ref 80–100)
MONOCYTES # BLD: 0.9 K/UL (ref 0–1.3)
MONOCYTES NFR BLD: 10.2 %
NEUTROPHILS # BLD: 3.7 K/UL (ref 1.7–7.7)
NEUTROPHILS NFR BLD: 42.9 %
NITRITE UR QL STRIP.AUTO: NEGATIVE
PH UR STRIP.AUTO: 6.5 [PH] (ref 5–8)
PLATELET # BLD AUTO: 228 K/UL (ref 135–450)
PMV BLD AUTO: 8.9 FL (ref 5–10.5)
POTASSIUM SERPL-SCNC: 4.1 MMOL/L (ref 3.5–5.1)
PROT SERPL-MCNC: 6.7 G/DL (ref 6.4–8.2)
PROT UR STRIP.AUTO-MCNC: NEGATIVE MG/DL
RBC # BLD AUTO: 5.02 M/UL (ref 4.2–5.9)
SODIUM SERPL-SCNC: 135 MMOL/L (ref 136–145)
SP GR UR STRIP.AUTO: 1.02 (ref 1–1.03)
UA COMPLETE W REFLEX CULTURE PNL UR: NORMAL
UA DIPSTICK W REFLEX MICRO PNL UR: NORMAL
URN SPEC COLLECT METH UR: NORMAL
UROBILINOGEN UR STRIP-ACNC: 0.2 E.U./DL
WBC # BLD AUTO: 8.6 K/UL (ref 4–11)

## 2025-02-27 PROCEDURE — 85025 COMPLETE CBC W/AUTO DIFF WBC: CPT

## 2025-02-27 PROCEDURE — 80053 COMPREHEN METABOLIC PANEL: CPT

## 2025-02-27 PROCEDURE — 81003 URINALYSIS AUTO W/O SCOPE: CPT

## 2025-02-27 PROCEDURE — 99284 EMERGENCY DEPT VISIT MOD MDM: CPT

## 2025-02-27 PROCEDURE — 83690 ASSAY OF LIPASE: CPT

## 2025-02-27 PROCEDURE — 74176 CT ABD & PELVIS W/O CONTRAST: CPT

## 2025-02-27 RX ORDER — KETOROLAC TROMETHAMINE 15 MG/ML
30 INJECTION, SOLUTION INTRAMUSCULAR; INTRAVENOUS ONCE
Status: DISCONTINUED | OUTPATIENT
Start: 2025-02-27 | End: 2025-02-27

## 2025-02-27 RX ORDER — KETOROLAC TROMETHAMINE 15 MG/ML
15 INJECTION, SOLUTION INTRAMUSCULAR; INTRAVENOUS ONCE
Status: DISCONTINUED | OUTPATIENT
Start: 2025-02-27 | End: 2025-02-28 | Stop reason: HOSPADM

## 2025-02-27 ASSESSMENT — PAIN - FUNCTIONAL ASSESSMENT: PAIN_FUNCTIONAL_ASSESSMENT: 0-10

## 2025-02-27 ASSESSMENT — PAIN DESCRIPTION - PAIN TYPE: TYPE: ACUTE PAIN

## 2025-02-27 ASSESSMENT — LIFESTYLE VARIABLES
HOW OFTEN DO YOU HAVE A DRINK CONTAINING ALCOHOL: NEVER
HOW MANY STANDARD DRINKS CONTAINING ALCOHOL DO YOU HAVE ON A TYPICAL DAY: PATIENT DOES NOT DRINK

## 2025-02-27 ASSESSMENT — PAIN DESCRIPTION - DESCRIPTORS: DESCRIPTORS: SHARP

## 2025-02-27 ASSESSMENT — PAIN DESCRIPTION - LOCATION: LOCATION: FLANK

## 2025-02-27 ASSESSMENT — PAIN DESCRIPTION - FREQUENCY: FREQUENCY: CONTINUOUS

## 2025-02-27 ASSESSMENT — PAIN DESCRIPTION - ORIENTATION: ORIENTATION: LEFT

## 2025-02-28 NOTE — ED PROVIDER NOTES
pH, Urine 6.5 5.0 - 8.0    Protein, UA Negative Negative mg/dL    Urobilinogen, Urine 0.2 <2.0 E.U./dL    Nitrite, Urine Negative Negative    Leukocyte Esterase, Urine Negative Negative    Microscopic Examination Not Indicated     Urine Type NotGiven     Urine Reflex to Culture Not Indicated        Procedures  Procedures    ED Course       Upon reassessment, Thee Sanderson is resting comfortably, pain is well-controlled    Diagnostic studies reviewed.  Patient's labs, urine, and CT are reassuring.  No signs of obstructing kidney stone.  No signs of UTI.  Suspect MSK cause of pain.    I discussed the results with patient/family including incidental findings.    Is this patient to be included in the SEP-1 core measure? No Exclusion criteria - the patient is NOT to be included for SEP-1 Core Measure due to: Infection is not suspected    Consults/discussion with other professionals: None    Clinical Impression:  1. Flank pain          Disposition / Plan:  Decision To Discharge  Condition: stable  Blood pressure 132/86, pulse 85, temperature 98.1 °F (36.7 °C), temperature source Oral, resp. rate 20, height 1.778 m (5' 10\"), weight 104.8 kg (231 lb), SpO2 98%.    Patient was given the following medications. I counseled patient how to take these medications.   Discharge Medication List as of 2/27/2025 10:02 PM          Follow Up / Referral (if applicable):  Karmen Lantigua PA  [address] [facility] [address] [phone]    Schedule an appointment as soon as possible for a visit         I, Cosmo Cardoso, am the primary attending of record and contributed the majority of evaluation and treatment of emergent care for this encounter.     This chart was generated in part by using Dragon Dictation system and may contain errors related to that system including errors in grammar, punctuation, and spelling, as well as words and phrases that may be inappropriate. If there are any questions or concerns please feel free

## 2025-03-28 ENCOUNTER — HOSPITAL ENCOUNTER (OUTPATIENT)
Age: 50
Discharge: HOME | End: 2025-03-28
Payer: COMMERCIAL

## 2025-03-28 DIAGNOSIS — R74.01 ELEVATED LIVER TRANSAMINASE LEVEL: ICD-10-CM

## 2025-03-28 LAB
ALBUMIN SERPL-MCNC: 4.4 G/DL (ref 3.4–5)
ALP SERPL-CCNC: 36 U/L (ref 40–129)
ALT SERPL-CCNC: 65 U/L (ref 10–40)
AST SERPL-CCNC: 50 U/L (ref 15–37)
BILIRUB DIRECT SERPL-MCNC: 0.2 MG/DL (ref 0–0.3)
BILIRUB INDIRECT SERPL-MCNC: 0.6 MG/DL (ref 0–1)
BILIRUB SERPL-MCNC: 0.8 MG/DL (ref 0–1)
PROT SERPL-MCNC: 7.5 G/DL (ref 6.4–8.2)

## 2025-03-28 PROCEDURE — 80076 HEPATIC FUNCTION PANEL: CPT

## 2025-03-28 PROCEDURE — 36415 COLL VENOUS BLD VENIPUNCTURE: CPT

## 2025-04-02 ENCOUNTER — HOSPITAL ENCOUNTER (OUTPATIENT)
Age: 50
Discharge: HOME | End: 2025-04-02
Payer: COMMERCIAL

## 2025-04-02 ENCOUNTER — HOSPITAL ENCOUNTER (OUTPATIENT)
Dept: ULTRASOUND IMAGING | Age: 50
Discharge: HOME | End: 2025-04-02
Payer: COMMERCIAL

## 2025-04-02 DIAGNOSIS — R74.01 ELEVATED LIVER TRANSAMINASE LEVEL: ICD-10-CM

## 2025-04-02 LAB
CHOLEST SERPL-MCNC: 109 MG/DL (ref 0–199)
FERRITIN SERPL IA-MCNC: 597 NG/ML (ref 30–400)
HDLC SERPL-MCNC: 28 MG/DL (ref 40–60)
IRON SATN MFR SERPL: 40 % (ref 20–50)
IRON SERPL-MCNC: 105 UG/DL (ref 59–158)
LDLC SERPL CALC-MCNC: 52 MG/DL
TIBC SERPL-MCNC: 264 UG/DL (ref 260–445)
TRIGL SERPL-MCNC: 144 MG/DL (ref 0–150)
VLDLC SERPL CALC-MCNC: 29 MG/DL

## 2025-04-02 PROCEDURE — 76705 ECHO EXAM OF ABDOMEN: CPT

## 2025-04-02 PROCEDURE — 84165 PROTEIN E-PHORESIS SERUM: CPT

## 2025-04-02 PROCEDURE — 82728 ASSAY OF FERRITIN: CPT

## 2025-04-02 PROCEDURE — 86803 HEPATITIS C AB TEST: CPT

## 2025-04-02 PROCEDURE — 84155 ASSAY OF PROTEIN SERUM: CPT

## 2025-04-02 PROCEDURE — 36415 COLL VENOUS BLD VENIPUNCTURE: CPT

## 2025-04-02 PROCEDURE — 80061 LIPID PANEL: CPT

## 2025-04-02 PROCEDURE — 83550 IRON BINDING TEST: CPT

## 2025-04-02 PROCEDURE — 87340 HEPATITIS B SURFACE AG IA: CPT

## 2025-04-02 PROCEDURE — 83540 ASSAY OF IRON: CPT

## 2025-04-02 PROCEDURE — 82390 ASSAY OF CERULOPLASMIN: CPT

## 2025-04-03 LAB
HBV SURFACE AG SERPL QL IA: NORMAL
HCV AB SERPL QL IA: REACTIVE

## 2025-04-04 LAB
ALBUMIN SERPL ELPH-MCNC: 3.7 G/DL (ref 3.1–4.9)
ALPHA1 GLOB SERPL ELPH-MCNC: 0.2 G/DL (ref 0.2–0.4)
ALPHA2 GLOB SERPL ELPH-MCNC: 0.8 G/DL (ref 0.4–1.1)
B-GLOBULIN SERPL ELPH-MCNC: 1.3 G/DL (ref 0.9–1.6)
CERULOPLASMIN SERPL-MCNC: 16 MG/DL (ref 15–30)
GAMMA GLOB SERPL ELPH-MCNC: 1.2 G/DL (ref 0.6–1.8)
PROT SERPL-MCNC: 7.2 G/DL (ref 6.4–8.2)
SPE/IFE INTERPRETATION: NORMAL

## 2025-04-16 ENCOUNTER — HOSPITAL ENCOUNTER (EMERGENCY)
Age: 50
Discharge: HOME | End: 2025-04-16
Attending: EMERGENCY MEDICINE
Payer: COMMERCIAL

## 2025-04-16 VITALS
HEART RATE: 102 BPM | TEMPERATURE: 98.4 F | RESPIRATION RATE: 15 BRPM | DIASTOLIC BLOOD PRESSURE: 81 MMHG | OXYGEN SATURATION: 100 % | BODY MASS INDEX: 31.55 KG/M2 | SYSTOLIC BLOOD PRESSURE: 131 MMHG | WEIGHT: 220.4 LBS | HEIGHT: 70 IN

## 2025-04-16 DIAGNOSIS — R50.9 FEBRILE ILLNESS: Primary | ICD-10-CM

## 2025-04-16 DIAGNOSIS — U07.1 COVID: ICD-10-CM

## 2025-04-16 LAB
FLUAV RNA RESP QL NAA+PROBE: NOT DETECTED
FLUBV RNA RESP QL NAA+PROBE: NOT DETECTED
SARS-COV-2 RNA RESP QL NAA+PROBE: DETECTED

## 2025-04-16 PROCEDURE — 99283 EMERGENCY DEPT VISIT LOW MDM: CPT

## 2025-04-16 PROCEDURE — 87636 SARSCOV2 & INF A&B AMP PRB: CPT

## 2025-04-16 ASSESSMENT — LIFESTYLE VARIABLES
HOW MANY STANDARD DRINKS CONTAINING ALCOHOL DO YOU HAVE ON A TYPICAL DAY: PATIENT DOES NOT DRINK
HOW OFTEN DO YOU HAVE A DRINK CONTAINING ALCOHOL: NEVER

## 2025-04-16 ASSESSMENT — PAIN - FUNCTIONAL ASSESSMENT: PAIN_FUNCTIONAL_ASSESSMENT: NONE - DENIES PAIN

## 2025-04-16 NOTE — ED PROVIDER NOTES
EMERGENCY DEPARTMENT ENCOUNTER     [facility] EMERGENCY DEPARTMENT     Pt Name: Thee Sanderson   MRN: [id number]   Birthdate 1975   Date of evaluation: 4/16/2025   Provider: Kvng Mchugh MD   PCP: Karmen Lantigua PA   Note Started: 1:19 AM EDT 4/16/25     CHIEF COMPLAINT     Chief Complaint   Patient presents with    Illness     Pt arrives with c/o having a fever that started a few hours ago. Pt states throat was filling \"scratchy\" before coming in. Pt states throat feels better and states no symptoms at this time other than fever.         HISTORY OF PRESENT ILLNESS:  History from : Patient        Thee Sanderson is a 49 y.o. male who presents for evaluation of fever and throat discomfort.  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  Today he began developing some discomfort and scratchiness in his throat and was having fevers.  He states that the discomfort in his throat has resolved.  Denies cough shortness of breath or rash.  He did take ibuprofen.  He states he is also been around multiple sick people at discharge who have had similar symptoms.  He states he has been told that there is parvovirus as well as COVID going around.     Nursing Notes were all reviewed and agreed with or any disagreements were addressed in the HPI.     ROS: Positives and Pertinent negatives as per HPI.    PAST MEDICAL HISTORY     Past medical history:  has a past medical history of Arthritis, Hepatitis C antibody positive in blood (08/23/2021), and Wears dentures.    Past surgical history:  has a past surgical history that includes Ankle surgery; Hand surgery; Finger surgery (Left, 07/25/2022); Colonoscopy (N/A, 11/01/2022); and Upper gastrointestinal endoscopy (N/A, 11/01/2022).    Med list:   No current facility-administered medications on file prior to encounter.     Current Outpatient Medications on File Prior to Encounter   Medication Sig Dispense

## 2025-07-11 ENCOUNTER — HOSPITAL ENCOUNTER (EMERGENCY)
Age: 50
Discharge: HOME | End: 2025-07-11
Payer: COMMERCIAL

## 2025-07-11 ENCOUNTER — APPOINTMENT (OUTPATIENT)
Dept: GENERAL RADIOLOGY | Age: 50
End: 2025-07-11
Payer: COMMERCIAL

## 2025-07-11 ENCOUNTER — APPOINTMENT (OUTPATIENT)
Dept: CT IMAGING | Age: 50
End: 2025-07-11
Payer: COMMERCIAL

## 2025-07-11 VITALS
RESPIRATION RATE: 18 BRPM | HEART RATE: 69 BPM | BODY MASS INDEX: 27.92 KG/M2 | SYSTOLIC BLOOD PRESSURE: 137 MMHG | DIASTOLIC BLOOD PRESSURE: 87 MMHG | OXYGEN SATURATION: 96 % | TEMPERATURE: 98.4 F | HEIGHT: 70 IN | WEIGHT: 195 LBS

## 2025-07-11 DIAGNOSIS — R10.12 ABDOMINAL PAIN, LEFT UPPER QUADRANT: Primary | ICD-10-CM

## 2025-07-11 LAB
ALBUMIN SERPL-MCNC: 4.2 G/DL (ref 3.4–5)
ALBUMIN/GLOB SERPL: 1.3 {RATIO} (ref 1.1–2.2)
ALP SERPL-CCNC: 38 U/L (ref 40–129)
ALT SERPL-CCNC: 50 U/L (ref 10–40)
ANION GAP SERPL CALCULATED.3IONS-SCNC: 14 MMOL/L (ref 3–16)
AST SERPL-CCNC: 36 U/L (ref 15–37)
BASOPHILS # BLD: 0.1 K/UL (ref 0–0.2)
BASOPHILS NFR BLD: 0.8 %
BILIRUB SERPL-MCNC: 0.5 MG/DL (ref 0–1)
BILIRUB UR QL STRIP.AUTO: NEGATIVE
BUN SERPL-MCNC: 16 MG/DL (ref 7–20)
CALCIUM SERPL-MCNC: 8.7 MG/DL (ref 8.3–10.6)
CHLORIDE SERPL-SCNC: 104 MMOL/L (ref 99–110)
CLARITY UR: CLEAR
CO2 SERPL-SCNC: 21 MMOL/L (ref 21–32)
COLOR UR: YELLOW
CREAT SERPL-MCNC: 0.7 MG/DL (ref 0.9–1.3)
DEPRECATED RDW RBC AUTO: 13.3 % (ref 12.4–15.4)
EOSINOPHIL # BLD: 0.2 K/UL (ref 0–0.6)
EOSINOPHIL NFR BLD: 1.8 %
EPI CELLS #/AREA URNS HPF: ABNORMAL /HPF (ref 0–5)
GFR SERPLBLD CREATININE-BSD FMLA CKD-EPI: >90 ML/MIN/{1.73_M2}
GLUCOSE SERPL-MCNC: 122 MG/DL (ref 70–99)
GLUCOSE UR STRIP.AUTO-MCNC: NEGATIVE MG/DL
HCT VFR BLD AUTO: 44.4 % (ref 40.5–52.5)
HGB BLD-MCNC: 15.8 G/DL (ref 13.5–17.5)
HGB UR QL STRIP.AUTO: ABNORMAL
KETONES UR STRIP.AUTO-MCNC: NEGATIVE MG/DL
LEUKOCYTE ESTERASE UR QL STRIP.AUTO: NEGATIVE
LIPASE SERPL-CCNC: 60 U/L (ref 13–60)
LYMPHOCYTES # BLD: 2.7 K/UL (ref 1–5.1)
LYMPHOCYTES NFR BLD: 31.3 %
MCH RBC QN AUTO: 31.7 PG (ref 26–34)
MCHC RBC AUTO-ENTMCNC: 35.5 G/DL (ref 31–36)
MCV RBC AUTO: 89.1 FL (ref 80–100)
MONOCYTES # BLD: 0.9 K/UL (ref 0–1.3)
MONOCYTES NFR BLD: 10.2 %
MUCOUS THREADS #/AREA URNS LPF: ABNORMAL /LPF
NEUTROPHILS # BLD: 4.7 K/UL (ref 1.7–7.7)
NEUTROPHILS NFR BLD: 55.9 %
NITRITE UR QL STRIP.AUTO: NEGATIVE
PH UR STRIP.AUTO: 6 [PH] (ref 5–8)
PLATELET # BLD AUTO: 214 K/UL (ref 135–450)
PMV BLD AUTO: 8.8 FL (ref 5–10.5)
POTASSIUM SERPL-SCNC: 4 MMOL/L (ref 3.5–5.1)
PROT SERPL-MCNC: 7.5 G/DL (ref 6.4–8.2)
PROT UR STRIP.AUTO-MCNC: NEGATIVE MG/DL
RBC # BLD AUTO: 4.98 M/UL (ref 4.2–5.9)
RBC #/AREA URNS HPF: ABNORMAL /HPF (ref 0–4)
SODIUM SERPL-SCNC: 139 MMOL/L (ref 136–145)
SP GR UR STRIP.AUTO: 1.02 (ref 1–1.03)
TROPONIN, HIGH SENSITIVITY: <6 NG/L (ref 0–22)
TROPONIN, HIGH SENSITIVITY: <6 NG/L (ref 0–22)
UA COMPLETE W REFLEX CULTURE PNL UR: ABNORMAL
UA DIPSTICK W REFLEX MICRO PNL UR: YES
URN SPEC COLLECT METH UR: ABNORMAL
UROBILINOGEN UR STRIP-ACNC: 0.2 E.U./DL
WBC # BLD AUTO: 8.5 K/UL (ref 4–11)
WBC #/AREA URNS HPF: ABNORMAL /HPF (ref 0–5)

## 2025-07-11 PROCEDURE — 93005 ELECTROCARDIOGRAM TRACING: CPT | Performed by: STUDENT IN AN ORGANIZED HEALTH CARE EDUCATION/TRAINING PROGRAM

## 2025-07-11 PROCEDURE — 36415 COLL VENOUS BLD VENIPUNCTURE: CPT

## 2025-07-11 PROCEDURE — 74177 CT ABD & PELVIS W/CONTRAST: CPT

## 2025-07-11 PROCEDURE — 71045 X-RAY EXAM CHEST 1 VIEW: CPT

## 2025-07-11 PROCEDURE — 84484 ASSAY OF TROPONIN QUANT: CPT

## 2025-07-11 PROCEDURE — 83690 ASSAY OF LIPASE: CPT

## 2025-07-11 PROCEDURE — 81001 URINALYSIS AUTO W/SCOPE: CPT

## 2025-07-11 PROCEDURE — 6360000004 HC RX CONTRAST MEDICATION

## 2025-07-11 PROCEDURE — 80053 COMPREHEN METABOLIC PANEL: CPT

## 2025-07-11 PROCEDURE — 85025 COMPLETE CBC W/AUTO DIFF WBC: CPT

## 2025-07-11 PROCEDURE — 99285 EMERGENCY DEPT VISIT HI MDM: CPT

## 2025-07-11 RX ORDER — IOPAMIDOL 755 MG/ML
75 INJECTION, SOLUTION INTRAVASCULAR
Status: COMPLETED | OUTPATIENT
Start: 2025-07-11 | End: 2025-07-11

## 2025-07-11 RX ADMIN — IOPAMIDOL 75 ML: 755 INJECTION, SOLUTION INTRAVENOUS at 21:28

## 2025-07-11 ASSESSMENT — PAIN DESCRIPTION - ORIENTATION: ORIENTATION: LEFT

## 2025-07-11 ASSESSMENT — PAIN DESCRIPTION - LOCATION: LOCATION: ABDOMEN

## 2025-07-11 ASSESSMENT — PAIN SCALES - GENERAL: PAINLEVEL_OUTOF10: 8

## 2025-07-11 ASSESSMENT — PAIN - FUNCTIONAL ASSESSMENT: PAIN_FUNCTIONAL_ASSESSMENT: 0-10

## 2025-07-12 LAB
EKG ATRIAL RATE: 83 BPM
EKG DIAGNOSIS: NORMAL
EKG P AXIS: 31 DEGREES
EKG P-R INTERVAL: 152 MS
EKG Q-T INTERVAL: 350 MS
EKG QRS DURATION: 86 MS
EKG QTC CALCULATION (BAZETT): 411 MS
EKG R AXIS: 8 DEGREES
EKG T AXIS: 32 DEGREES
EKG VENTRICULAR RATE: 83 BPM

## 2025-07-12 PROCEDURE — 93010 ELECTROCARDIOGRAM REPORT: CPT | Performed by: INTERNAL MEDICINE

## 2025-07-12 NOTE — ED PROVIDER NOTES
[facility] EMERGENCY DEPARTMENT  EMERGENCY DEPARTMENT ENCOUNTER        Pt Name: Thee Sanderson  MRN: [id number]  Birthdate 1975  Date of evaluation: 7/11/2025  Provider: CHERYL Oakes  PCP: Karmen Lantigua PA  Note Started: 8:10 PM EDT 7/11/25      CAREY. I have evaluated this patient.        CHIEF COMPLAINT       Chief Complaint   Patient presents with    Abdominal Pain    Back Pain     Pt arrives from home with back pain that radiates to abd and ribs x1 week        HISTORY OF PRESENT ILLNESS: 1 or more Elements     History From: Patient    Limitations to history : None    Social Determinants Significantly Affecting Health : None    Chief Complaint: Abdominal pain    Thee Sanderson is a 49 y.o. male who presents to the emergency department for left upper quadrant abdominal pain.  States that it started with lower back pain around 1 week ago and now it is wrapping around to the left upper quadrant and into the groin.  Also states that he is feeling like he has been having intermittent palpitations and pain in his hips.  He does have a history of kidney stones.  He denies testicular pain, fevers, chills, nausea, vomiting, diarrhea, constipation dysuria, urinary frequency, chest pain, or shortness of breath.    Nursing Notes were all reviewed and agreed with or any disagreements were addressed in the HPI.    REVIEW OF SYSTEMS :      Review of Systems    Positives and Pertinent negatives as per HPI.     SURGICAL HISTORY     Past Surgical History:   Procedure Laterality Date    ANKLE SURGERY      COLONOSCOPY N/A 11/01/2022    Repeat in 5 years (11/2027); COLONOSCOPY performed by Shun Sandoval MD at [facility] ENDOSCOPY    FINGER SURGERY Left 07/25/2022    OPEN REDUCTION AND INTERNAL FIXATION OF SMALL FINGER CARPOMETACARPAL FRACTURE DISLOCATION performed by Papo Holder MD at [facility] OR    HAND SURGERY      UPPER GASTROINTESTINAL ENDOSCOPY N/A 11/01/2022    EGD BIOPSY performed by Shun MORALES          1.778 m (5' 10\") 88.5 kg (195 lb)             Physical Exam  Vitals and nursing note reviewed.   Constitutional:       Appearance: Normal appearance. He is not toxic-appearing or diaphoretic.   HENT:      Head: Normocephalic and atraumatic.      Nose: Nose normal.      Mouth/Throat:      Mouth: Mucous membranes are moist.   Eyes:      General:         Right eye: No discharge.         Left eye: No discharge.      Extraocular Movements: Extraocular movements intact.      Pupils: Pupils are equal, round, and reactive to light.   Cardiovascular:      Rate and Rhythm: Normal rate and regular rhythm.   Pulmonary:      Effort: Pulmonary effort is normal. No respiratory distress.      Breath sounds: Normal breath sounds. No wheezing, rhonchi or rales.   Abdominal:      Palpations: Abdomen is soft.      Tenderness: There is abdominal tenderness (Left upper quadrant). There is no right CVA tenderness, left CVA tenderness, guarding or rebound.   Musculoskeletal:         General: Normal range of motion.      Cervical back: Normal range of motion and neck supple.      Comments: No bony spinal tenderness, step-off, deformity, or crepitus.  Good perfusion of all extremities with intact sensation   Skin:     General: Skin is warm and dry.   Neurological:      General: No focal deficit present.      Mental Status: He is alert and oriented to person, place, and time.      GCS: GCS eye subscore is 4. GCS verbal subscore is 5. GCS motor subscore is 6.      Cranial Nerves: Cranial nerves 2-12 are intact.      Sensory: Sensation is intact.      Motor: Motor function is intact.      Gait: Gait is intact.   Psychiatric:         Mood and Affect: Mood normal.         Behavior: Behavior normal.           DIAGNOSTIC RESULTS   LABS:    Labs Reviewed   COMPREHENSIVE METABOLIC PANEL W/ REFLEX TO MG FOR LOW K - Abnormal; Notable for the following components:       Result Value    Glucose 122 (*)     Creatinine 0.7 (*)     Alkaline

## 2025-07-12 NOTE — ED PROVIDER NOTES
I independently reviewed the ECG as follows:    Sinus rhythm at a rate of 83 without ectopy.  Normal axis and intervals.  No evidence of acute ischemia.  EKG does not appear significantly changed from prior dated June 9, 2023.    Please reference my attending note if I had further involvement in the care of this patient otherwise I did not participate in the care of this patient beyond evaluation of this ECG.  Please reference the CAREY's documentation for further details.        Car Peacock MD  07/12/25 0157

## 2025-07-12 NOTE — DISCHARGE INSTRUCTIONS
You were seen today for abdominal pain.  Your overall lab abscess and imaging were reassuring.  You can take ibuprofen 600 mg every 6 hours as needed for further symptom relief.  You can apply cold and warm compresses.  Please call and schedule a follow-up appoint with your primary care provider within the next few days for reevaluation.  Return to this department for any new or worsening symptoms including uncontrolled pain, persistent vomiting, high-grade fevers, chest pain, shortness of breath.

## (undated) DEVICE — BANDAGE COMPR W1INXL5YD BGE E ADH TENSOPLAST

## (undated) DEVICE — SUTURE ETHLN SZ 4-0 L18IN NONABSORBABLE BLK L19MM PS-2 3/8 1667H

## (undated) DEVICE — SUTURE FIBERWIRE 4-0 L18IN NONABSORBABLE BLU L12.3MM 3/8 AR723001

## (undated) DEVICE — SINGLE USE DEVICE INTENDED TO COVER EXPOSED ENDS OF ORTHOPEDIC PIN AND K-WIRES TO HELP PROTECT THE EXPOSED WIRE FROM SNAGGING ON CLOTHING.: Brand: OXBORO™ PIN COVER

## (undated) DEVICE — PADDING CAST W4INXL4YD HIGHLY ABSRB THAN COT EZ APPL

## (undated) DEVICE — FORCEPS BX L240CM JAW DIA2.8MM L CAP W/ NDL MIC MESH TOOTH

## (undated) DEVICE — GAUZE,SPONGE,4"X4",16PLY,XRAY,STRL,LF: Brand: MEDLINE

## (undated) DEVICE — STOCKINETTE,DOUBLE PLY,6X48,STERILE: Brand: MEDLINE

## (undated) DEVICE — ZIMMER® STERILE DISPOSABLE TOURNIQUET CUFF WITH PLC, DUAL PORT, SINGLE BLADDER, 18 IN. (46 CM)

## (undated) DEVICE — ENDOSCOPIC KIT 2 12 FT OP4 DE2 GWN SYR

## (undated) DEVICE — GLOVE ORTHO 7 1/2   MSG9475

## (undated) DEVICE — CONMED SCOPE SAVER BITE BLOCK, 20X27 MM: Brand: SCOPE SAVER

## (undated) DEVICE — UNDERGLOVE SURG SZ 8 BLU LTX FREE SYN POLYISOPRENE POLYMER

## (undated) DEVICE — BANDAGE,ELASTIC,ESMARK,STERILE,4"X9',LF: Brand: MEDLINE

## (undated) DEVICE — BANDAGE,GAUZE,CONFORMING,3"X75",STRL,LF: Brand: MEDLINE

## (undated) DEVICE — SUTURE PDS II SZ 4-0 L18IN ABSRB UD L19MM PS-2 3/8 CIR PRIM Z496G

## (undated) DEVICE — SOLUTION IV 1000ML 0.9% SOD CHL

## (undated) DEVICE — TOWEL,STOP FLAG GOLD N-W: Brand: MEDLINE

## (undated) DEVICE — COVER,TABLE,HEAVY DUTY,77"X90",STRL: Brand: MEDLINE

## (undated) DEVICE — TRAY,IRRIGATION,BULBSYRINGE,60ML,CSR,PVP: Brand: MEDLINE

## (undated) DEVICE — HAND: Brand: MEDLINE INDUSTRIES, INC.

## (undated) DEVICE — SYRINGE IRRIG 60ML SFT PLIABLE BLB EZ TO GRP 1 HND USE W/

## (undated) DEVICE — CANNULA NSL AD TBNG L7FT PVC STR NONFLARED PRNG O2 DEL W STD

## (undated) DEVICE — ELECTRODE PT RET AD L9FT HI MOIST COND ADH HYDRGEL CORDED

## (undated) DEVICE — BANDAGE COBAN 4 IN COMPR W4INXL5YD FOAM COHESIVE QUIK STK SELF ADH SFT

## (undated) DEVICE — GLOVE SURG SZ 8 L12IN FNGR THK79MIL GRN LTX FREE

## (undated) DEVICE — ELECTRODE,ECG,STRESS,FOAM,3PK: Brand: MEDLINE

## (undated) DEVICE — Device

## (undated) DEVICE — GARMENT,MEDLINE,DVT,INT,CALF,MED, GEN2: Brand: MEDLINE

## (undated) DEVICE — WRAP COMPR W2INXL5YD TAN SELF ADH COBAN

## (undated) DEVICE — SUTURE VCRL SZ 4-0 L18IN ABSRB UD L19MM PS-2 3/8 CIR PRIM J496H

## (undated) DEVICE — GOWN,SIRUS,POLYRNF,BRTHSLV,XLN/XL,20/CS: Brand: MEDLINE